# Patient Record
Sex: MALE | Race: BLACK OR AFRICAN AMERICAN | Employment: OTHER | URBAN - METROPOLITAN AREA
[De-identification: names, ages, dates, MRNs, and addresses within clinical notes are randomized per-mention and may not be internally consistent; named-entity substitution may affect disease eponyms.]

---

## 2017-03-29 ENCOUNTER — HOSPITAL ENCOUNTER (OUTPATIENT)
Dept: MRI IMAGING | Age: 37
Discharge: HOME | End: 2017-03-29
Attending: NURSE PRACTITIONER
Payer: COMMERCIAL

## 2017-03-29 DIAGNOSIS — G89.29 CHRONIC RIGHT SHOULDER PAIN: ICD-10-CM

## 2017-03-29 DIAGNOSIS — M25.511 CHRONIC RIGHT SHOULDER PAIN: ICD-10-CM

## 2017-03-29 PROCEDURE — 73221 MRI JOINT UPR EXTREM W/O DYE: CPT

## 2017-03-30 NOTE — PROGRESS NOTES
There is a 7mm partial thickness tear in the distal supraspinatus. Due to his pain and weakness, will send to orthopedic to evaluate.

## 2017-04-06 ENCOUNTER — HOSPITAL ENCOUNTER (OUTPATIENT)
Dept: SLEEP MEDICINE | Age: 37
Discharge: HOME | End: 2017-04-06
Payer: COMMERCIAL

## 2017-04-06 PROCEDURE — 95810 POLYSOM 6/> YRS 4/> PARAM: CPT

## 2017-04-17 ENCOUNTER — HOSPITAL ENCOUNTER (OUTPATIENT)
Dept: PHYSICAL THERAPY | Age: 37
Discharge: HOME | End: 2017-04-17
Payer: COMMERCIAL

## 2017-04-17 NOTE — PROGRESS NOTES
Jaya Hoskins  : 1980 [facility] at [facility]  [address], [address]  [phone]   [phone]         OUTPATIENT PHYSICAL THERAPY:Initial Assessment 2017    ICD-10: Treatment Diagnosis: Pain in right shoulder (M25.511)  Incomplete rotator cuff teaImpingement syndrome of right shoulder (M75.41)r or rupture of right shoulder, not specified as traumatic (M75.111)  Precautions/Allergies:   Penicillins and Sulfa (sulfonamide antibiotics)  Fall Risk Score: 1 (? 5 = High Risk)  MD Orders: Evaluate and treat MEDICAL/REFERRING DIAGNOSIS:  Impingement syndrome of right shoulder [M75.41]  Incomplete rotator cuff tear or rupture of right shoulder, not specified as traumatic [M75.111]   DATE OF ONSET: chronic  REFERRING PHYSICIAN: Colletta Pelton, MD  RETURN PHYSICIAN APPOINTMENT: 17     INITIAL ASSESSMENT:  Mr. Rodrigo Clark presents with chronic R shoulder pain with overhead and lifting ADL's with recent worsening of symptoms. His workout regimen of weight lifting is likely contributing to symptoms and may affect recovery without more modification of routine. He has decreased mobility with shoulder extension and IR along with decreased strength rotator cuff/scapular musculature and associated pain. He has incomplete RTC tear and is consulting doctor about possible surgery at same time as cubital tunnel surgery. He can benefit from skilled therapy to address the above deficits along with education on posture and modification of weight lifting routine. PROBLEM LIST (Impacting functional limitations):  1. Decreased Strength  2. Decreased ADL/Functional Activities  3. Increased Pain  4. Decreased Flexibility/Joint Mobility  5. Decreased [place] with Home Exercise Program INTERVENTIONS PLANNED:  1. Home Exercise Program (HEP)  2. Manual Therapy  3. Range of Motion (ROM)  4.  Therapeutic Exercise/Strengthening  5. modalities/taping as indicated   TREATMENT PLAN:  Effective Dates: 4/17/17 TO 7/17/17. Frequency/Duration: 2 times a week for 12 weeks  GOALS: (Goals have been discussed and agreed upon with patient.)  Short-Term Functional Goals: Time Frame: 2-4 weeks  1. Decrease pain by 2/10 to allow greater ease with cross-body and overhead ADL's  2. Increase ROM shoulder extension by 10 ° for decreased pain with functional movts  3. Increase strength shoulder ER by 1/2 grade to decrease shoulder impingement and pain with lifting ADL's  4. [place] in HEP with minimal cueing. 5. Patient to be aware of postural habits contributing to abnormal shoulder mechancies  DISCHARGE GOALS: Time Frame: 12 weeks  1. Decrease DASH 9 points for greater ease with all ADL's and recreational activities, including weight lifting. 2. Increase strength of rotator cuff and scapula musculature to WDL for greater ease/minimal pain with overhead and reaching UE ADL's  3. Increase ROM shoulder extension and IR to WDL for improved mechanics and functional mobility ADL's (reahcing behind back)  4. Demonstrate independence in advanced home exercise program to allow DC from physical therapy. Rehabilitation Potential For Stated Goals: Fair  Regarding Dena Lin Sr.'s therapy, I certify that the treatment plan above will be carried out by a therapist or under their direction. Thank you for this referral,  Haresh De Leon PT     Referring Physician Signature: Patsy Cardona MD              Date                    The information in this section was collected on 4/17/17 (except where otherwise noted). HISTORY:   History of Present Injury/Illness (Reason for Referral):  Patient reports pain in R shoulder on and off for past couple of years, with recent worsening of symptoms in past couple of months. Also reports intermittent popping/clicking with certain movts. Pain mostly present with reaching overhead, lifting/carrying heavy objects.  Sometimes it will ache for part of the day. Sometimes painful at night. 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 Works out 5 days/week doing weight lifting - has modified this recently due to shoulder pain (normally would do bench press, incline press, lat pull-downs, rows, front and lateral raises etc. Has stopped pull-ups due to pain and used lighter weights for other ex's. Also has R elbow pain with numbness in lateral fingers - cubital tunnel syndrome which he will have surgery for. States that he is waiting for return call from doctor regarding possibly having shoulder surgery at the same time if he is going to need it at later time anyway. Had cortisone shot 2 weeks ago with no relief of pain and possibly worsening of symptoms. Past Medical History/Comorbidities:   Mr. Luma Diaz  has a past medical history of HTN (hypertension). Mr. Luma Diaz  has a past surgical history that includes other surgical and wisdom teeth extraction. 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 , Disp: 90 Tab, Rfl: 3    varenicline (CHANTIX STARTING MONTH BOX) 0.5 mg (11)- 1 mg (42) DsPk, Take as directed., Disp: 1 Dose Pack, Rfl: 0    [START ON 4/18/2017] varenicline (CHANTIX) 1 mg tablet, Take 1 Tab by mouth two (2) times a day., Disp: 60 Tab, Rfl: 5    lisinopril-hydroCHLOROthiazide (PRINZIDE, ZESTORETIC) 20-25 mg per tablet, Take 1 Tab by mouth daily. , Disp: 90 Tab, Rfl: 3    amLODIPine (NORVASC) 5 mg tablet, Take 1 Tab by mouth daily. , Disp: 90 Tab, Rfl: 3    neomycin-polymyxin-hydrocortisone, buffered, (PEDIOTIC) 3.5-10,000-1 mg/mL-unit/mL-% otic suspension, Administer 3 Drops in left ear three (3) times daily. , Disp: 10 mL, Rfl: 0   Date Last Reviewed:  4/17/2017     Number of Personal Factors/Comorbidities that affect the Plan of Care: 1-2: MODERATE COMPLEXITY   EXAMINATION:   Observation/Orthostatic Postural Assessment: Very muscular build with both arms held in abducted position. Moderately rounded shoulders  Palpation:    Moderate tenderness over rot cuff insertion  ROM:       LUE Assessment  LUE Assessment (WDL): Exceptions to WDL  LUE AROM  L Shoulder Flexion: 160  L Shoulder Extension: 35  L Shoulder Internal Rotation: 50  L Shoulder External Rotation: 95              RUE Assessment  RUE Assessment (WDL): Exception to WDL  RUE AROM  R Shoulder Flexion: 155 (pain at 140 deg)  R Shoulder Extension: 30 (pain end range)  R Shoulder Internal Rotation: 45 (pain with IR behind back)  R Shoulder External Rotation: 90 (pain end range)      Strength:    LUE Strength, Tone, Sensation  LUE Strength, Tone, Sensation (WDL): Within defined limits        RUE Strength, Tone, Sensation  RUE Strength, Tone, Sensation (WDL): Exception to WDL  RUE Strength  R Shoulder Flexion: 4+ (pain)  R Shoulder ABduction: 4+  R Shoulder External Rotation: 4- (mod pain)            Special Tests: +ve impingement tests  Neurological Screen:  WDL  (except for int numbness 4th and 5th fingers due to cubital tunnel syndrome)  Functional Mobility:      marked limitation in IR behind back with increased pain (to L-S area)  Balance: WDL   Body Structures Involved:  1. Nerves  2. Joints  3. Muscles  4. Ligaments Body Functions Affected:  1. Sensory/Pain  2. Neuromusculoskeletal  3. Movement Related Activities and Participation Affected:  1. General Tasks and Demands  2. Mobility  3.  Self Care   Number of elements (examined above) that affect the Plan of Care: 3: MODERATE COMPLEXITY   CLINICAL PRESENTATION:   Presentation: Evolving clinical presentation with changing clinical characteristics: MODERATE COMPLEXITY   CLINICAL DECISION MAKING:   Outcome Measure: Tool Used: Disabilities of the Arm, Shoulder and Hand (DASH) Questionnaire - Quick Version  Score:  Initial: 20/55  Most Recent: X/55 (Date: -- )   Interpretation of Score: The DASH is designed to measure the activities of daily living in person's with upper extremity dysfunction or pain. Each section is scored on a 1-5 scale, 5 representing the greatest disability. The scores of each section are added together for a total score of 55. Score 11 12-19 20-28 29-37 38-45 46-54 55   Modifier CH CI CJ CK CL CM CN       Medical Necessity:   · Patient is expected to demonstrate progress in strength, range of motion and functional technique to increase independence with all UE ADL's. · Skilled intervention continues to be required due to pain, decreased ROM & strength limiting UE ADL's. Reason for Services/Other Comments:  · Patient continues to continues to require modification of therapeutic interventions to increase complexity of exercises. · Patient continues to require skilled intervention due to pain, decreased ROM, and strength limiting UE ADL's. Use of outcome tool(s) and clinical judgement create a POC that gives a: Questionable prediction of patient's progress: MODERATE COMPLEXITY            TREATMENT:   (In addition to Assessment/Re-Assessment sessions the following treatments were rendered)  Pre-treatment Symptoms/Complaints:  R shoulder pain with movement  Pain: Initial:   Pain Intensity 1: 7  Post Session:  7     Therapeutic Exercise: ( ):  Exercises as outlined below to improve mobility and strength. Required moderate verbal and tactile cues to promote proper body posture and promote proper body mechanics. Progressed resistance, range and repetitions as indicated.  Patient instructed in initial HEP followed by patient demonstration to include:    wilfrid shoulder extension, green TBand, pain-free range  Side ER , 1-2#  Supine shoulder protraction, 10#  Standing passive shoulder extension stretching with cane  Patient to begin with 1-2 sets/10 and progress to 3 sets/10 as pain allows. Cautioned to limit ROM of exercises to be painfree  Instructed in postural correction in sitting and standing and its contribution in facilitating normal shoulder mechanics. Discussed weight training modification  Manual Therapy (      ):   Evaluation (X ):  Therapeutic Modalities:     Treatment/Session Assessment:    · Response to Treatment:  Patients response to todays treatment session was tolerated with mild discomfort. The patient demonstrated independence with the initial home exercises and is to perform the exercises in conjunction with continued physical therapy. · Compliance with Program/Exercises: Will assess as treatment progresses. · Recommendations/Intent for next treatment session: \"Next visit will focus on advancements to more challenging activities\". Stretching to increase shoulder extension, IR ROM. FREQUENCY/DURATION: Follow patient 2 times a week for 12 weeks to address above goals, and upon reassessment will adjust frequency and duration as progress indicates.   Total Treatment Duration:  PT Patient Time In/Time Out  Time In: 0900  Time Out: Maryjane Gonzalez 28., PT

## 2017-04-20 ENCOUNTER — APPOINTMENT (OUTPATIENT)
Dept: PHYSICAL THERAPY | Age: 37
End: 2017-04-20
Payer: COMMERCIAL

## 2017-04-21 ENCOUNTER — HOSPITAL ENCOUNTER (OUTPATIENT)
Dept: PHYSICAL THERAPY | Age: 37
Discharge: HOME | End: 2017-04-21
Payer: COMMERCIAL

## 2017-04-21 PROCEDURE — 97110 THERAPEUTIC EXERCISES: CPT

## 2017-04-24 ENCOUNTER — HOSPITAL ENCOUNTER (OUTPATIENT)
Dept: PHYSICAL THERAPY | Age: 37
Discharge: HOME | End: 2017-04-24
Payer: COMMERCIAL

## 2017-04-24 PROCEDURE — 97110 THERAPEUTIC EXERCISES: CPT

## 2017-04-24 NOTE — PROGRESS NOTES
Anahi Chambers  : 1980 [facility]  [address], [facility], [address]  [phone]   [phone]         OUTPATIENT PHYSICAL THERAPY:Daily Note 2017    ICD-10: Treatment Diagnosis: Pain in right shoulder (M25.511)  Incomplete rotator cuff teaImpingement syndrome of right shoulder (M75.41)r or rupture of right shoulder, not specified as traumatic (M75.111)  Precautions/Allergies:   Penicillins and Sulfa (sulfonamide antibiotics)  Fall Risk Score: 1 (? 5 = High Risk)  MD Orders: Evaluate and treat MEDICAL/REFERRING DIAGNOSIS:  Impingement syndrome of right shoulder [M75.41]  Incomplete rotator cuff tear or rupture of right shoulder, not specified as traumatic [M75.111]   DATE OF ONSET: chronic  REFERRING PHYSICIAN: Ken Carpio MD  RETURN PHYSICIAN APPOINTMENT: 17     INITIAL ASSESSMENT:  Mr. Estefany Zepeda presents with chronic R shoulder pain with overhead and lifting ADL's with recent worsening of symptoms. His workout regimen of weight lifting is likely contributing to symptoms and may affect recovery without more modification of routine. He has decreased mobility with shoulder extension and IR along with decreased strength rotator cuff/scapular musculature and associated pain. He has incomplete RTC tear and is consulting doctor about possible surgery at same time as cubital tunnel surgery. He can benefit from skilled therapy to address the above deficits along with education on posture and modification of weight lifting routine. PROBLEM LIST (Impacting functional limitations):  1. Decreased Strength  2. Decreased ADL/Functional Activities  3. Increased Pain  4. Decreased Flexibility/Joint Mobility  5. Decreased [place] with Home Exercise Program INTERVENTIONS PLANNED:  1. Home Exercise Program (HEP)  2. Manual Therapy  3. Range of Motion (ROM)  4.  Therapeutic Exercise/Strengthening  5. modalities/taping as indicated TREATMENT PLAN:  Effective Dates: 4/17/17 TO 7/17/17. Frequency/Duration: 2 times a week for 12 weeks  GOALS: (Goals have been discussed and agreed upon with patient.)  Short-Term Functional Goals: Time Frame: 2-4 weeks  1. Decrease pain by 2/10 to allow greater ease with cross-body and overhead ADL's  2. Increase ROM shoulder extension by 10 ° for decreased pain with functional movts  3. Increase strength shoulder ER by 1/2 grade to decrease shoulder impingement and pain with lifting ADL's  4. [place] in HEP with minimal cueing. 5. Patient to be aware of postural habits contributing to abnormal shoulder mechancies  DISCHARGE GOALS: Time Frame: 12 weeks  1. Decrease DASH 9 points for greater ease with all ADL's and recreational activities, including weight lifting. 2. Increase strength of rotator cuff and scapula musculature to WDL for greater ease/minimal pain with overhead and reaching UE ADL's  3. Increase ROM shoulder extension and IR to WDL for improved mechanics and functional mobility ADL's (reahcing behind back)  4. Demonstrate independence in advanced home exercise program to allow DC from physical therapy. Rehabilitation Potential For Stated Goals: Fair  Regarding Demarionick Vance Sr.'s therapy, I certify that the treatment plan above will be carried out by a therapist or under their direction. Thank you for this referral,  Aida Vaughn PT     Referring Physician Signature: Nicky Huitron MD              Date                    The information in this section was collected on 4/17/17 (except where otherwise noted). HISTORY:   History of Present Injury/Illness (Reason for Referral):  Patient reports pain in R shoulder on and off for past couple of years, with recent worsening of symptoms in past couple of months. Also reports intermittent popping/clicking with certain movts. Pain mostly present with reaching overhead, lifting/carrying heavy objects.  Sometimes it will ache for part of the day. Sometimes painful at night. 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 Works out 5 days/week doing weight lifting - has modified this recently due to shoulder pain (normally would do bench press, incline press, lat pull-downs, rows, front and lateral raises etc. Has stopped pull-ups due to pain and used lighter weights for other ex's. Also has R elbow pain with numbness in lateral fingers - cubital tunnel syndrome which he will have surgery for. States that he is waiting for return call from doctor regarding possibly having shoulder surgery at the same time if he is going to need it at later time anyway. Had cortisone shot 2 weeks ago with no relief of pain and possibly worsening of symptoms. Past Medical History/Comorbidities:   Mr. Marleny Solares  has a past medical history of HTN (hypertension). Mr. Marleny Solares  has a past surgical history that includes other surgical and wisdom teeth extraction. 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 , Disp: 90 Tab, Rfl: 3    varenicline (CHANTIX STARTING MONTH BOX) 0.5 mg (11)- 1 mg (42) DsPk, Take as directed., Disp: 1 Dose Pack, Rfl: 0    varenicline (CHANTIX) 1 mg tablet, Take 1 Tab by mouth two (2) times a day., Disp: 60 Tab, Rfl: 5    lisinopril-hydroCHLOROthiazide (PRINZIDE, ZESTORETIC) 20-25 mg per tablet, Take 1 Tab by mouth daily. , Disp: 90 Tab, Rfl: 3    amLODIPine (NORVASC) 5 mg tablet, Take 1 Tab by mouth daily. , Disp: 90 Tab, Rfl: 3    neomycin-polymyxin-hydrocortisone, buffered, (PEDIOTIC) 3.5-10,000-1 mg/mL-unit/mL-% otic suspension, Administer 3 Drops in left ear three (3) times daily. , Disp: 10 mL, Rfl: 0   Date Last Reviewed:  4/24/2017     Number of Personal Factors/Comorbidities that affect the Plan of Care: 1-2: MODERATE COMPLEXITY   EXAMINATION:   Observation/Orthostatic Postural Assessment: Very muscular build with both arms held in abducted position. Moderately rounded shoulders  Palpation:    Moderate tenderness over rot cuff insertion  ROM:       LUE Assessment  LUE Assessment (WDL): Exceptions to WDL  LUE AROM  L Shoulder Flexion: 160  L Shoulder Extension: 35  L Shoulder Internal Rotation: 50  L Shoulder External Rotation: 95              RUE Assessment  RUE Assessment (WDL): Exception to WDL  RUE AROM  R Shoulder Flexion: 155 (pain at 140 deg)  R Shoulder Extension: 30 (pain end range)  R Shoulder Internal Rotation: 45 (pain with IR behind back)  R Shoulder External Rotation: 90 (pain end range)      Strength:    LUE Strength, Tone, Sensation  LUE Strength, Tone, Sensation (WDL): Within defined limits        RUE Strength, Tone, Sensation  RUE Strength, Tone, Sensation (WDL): Exception to WDL  RUE Strength  R Shoulder Flexion: 4+ (pain)  R Shoulder ABduction: 4+  R Shoulder External Rotation: 4- (mod pain)            Special Tests: +ve impingement tests  Neurological Screen:  WDL  (except for int numbness 4th and 5th fingers due to cubital tunnel syndrome)  Functional Mobility:      marked limitation in IR behind back with increased pain (to L-S area)  Balance: WDL   Body Structures Involved:  1. Nerves  2. Joints  3. Muscles  4. Ligaments Body Functions Affected:  1. Sensory/Pain  2. Neuromusculoskeletal  3. Movement Related Activities and Participation Affected:  1. General Tasks and Demands  2. Mobility  3.  Self Care   Number of elements (examined above) that affect the Plan of Care: 3: MODERATE COMPLEXITY   CLINICAL PRESENTATION:   Presentation: Evolving clinical presentation with changing clinical characteristics: MODERATE COMPLEXITY   CLINICAL DECISION MAKING: Outcome Measure: Tool Used: Disabilities of the Arm, Shoulder and Hand (DASH) Questionnaire - Quick Version  Score:  Initial: 20/55  Most Recent: X/55 (Date: -- )   Interpretation of Score: The DASH is designed to measure the activities of daily living in person's with upper extremity dysfunction or pain. Each section is scored on a 1-5 scale, 5 representing the greatest disability. The scores of each section are added together for a total score of 55. Score 11 12-19 20-28 29-37 38-45 46-54 55   Modifier CH CI CJ CK CL CM CN       Medical Necessity:   · Patient is expected to demonstrate progress in strength, range of motion and functional technique to increase independence with all UE ADL's. · Skilled intervention continues to be required due to pain, decreased ROM & strength limiting UE ADL's. Reason for Services/Other Comments:  · Patient continues to continues to require modification of therapeutic interventions to increase complexity of exercises. · Patient continues to require skilled intervention due to pain, decreased ROM, and strength limiting UE ADL's. Use of outcome tool(s) and clinical judgement create a POC that gives a: Questionable prediction of patient's progress: MODERATE COMPLEXITY            TREATMENT:   (In addition to Assessment/Re-Assessment sessions the following treatments were rendered)  Pre-treatment Symptoms/Complaints:  States shoulder not feeling quite as painful today. Pain: Initial:   Pain Intensity 1: 6  Post Session:  7     Therapeutic Exercise: (40 min ):  Exercises as outlined below to improve mobility and strength. Required moderate verbal and tactile cues to promote proper body posture and promote proper body mechanics. Progressed resistance, range and repetitions as indicated.      wilfrid shoulder extension, blueTBand, pain-free range, 2 x 10  Side ER , 1# , 2 x 10  Supine shoulder protraction, 10#, 2 x 10  Supine shoulder circumduction, 10#, 1 x 10 each eay  Standing passive shoulder extension stretching with cane - reviewed  Prone sh ext, 10#, 2 x 10  Prone horiz abd, 3#, 2 x 8  Sh ER, red Tband, 2 x 10  Sh IR, blue Tband, 2 x 10  Shoulder retraction, blue TBand, 2 x 10  Scaption to 90 deg, 5# , 2 x 10  Sitting push-ups, 1 x 10  Instructed in postural correction in sitting and standing and its contribution in facilitating normal shoulder mechanics. Discussed weight training modification  (reviewed)  Manual Therapy (5 min): Shoulder distraction, inf glide jt mobs, passive stretching into extension in standing, also in supine to increase IR mob    Therapeutic Modalities: encouraged to use ice at home     Treatment/Session Assessment:    · Response to Treatment:  Patients response to todays treatment session was tolerated with mild discomfort. Fatigues easily with ER ex and horiz abd - particularly weak at end rg. Less pain with overhead elevation after extension stretching. Did well with scaption exercises. Discussed need to continue to modify weight training ex's to minimise irritation of shoulder. · Compliance with Program/Exercises: Compliant with HEP. · Recommendations/Intent for next treatment session: \"Next visit will focus on advancements to more challenging activities\". Stretching to increase shoulder extension, IR ROM. FREQUENCY/DURATION: Follow patient 2 times a week for 12 weeks to address above goals, and upon reassessment will adjust frequency and duration as progress indicates.   Total Treatment Duration:  PT Patient Time In/Time Out  Time In: 1215  Time Out: 1305  No. Of Visits: SAMANTHA Novak 54, PT

## 2017-04-26 ENCOUNTER — HOSPITAL ENCOUNTER (OUTPATIENT)
Dept: PHYSICAL THERAPY | Age: 37
Discharge: HOME | End: 2017-04-26
Payer: COMMERCIAL

## 2017-04-26 PROCEDURE — 97110 THERAPEUTIC EXERCISES: CPT

## 2017-04-26 NOTE — PROGRESS NOTES
Kathy Sage  : 1980 [facility]  [address], [facility], [address]  [phone]   [phone]         OUTPATIENT PHYSICAL THERAPY:Daily Note 2017    ICD-10: Treatment Diagnosis: Pain in right shoulder (M25.511)  Incomplete rotator cuff teaImpingement syndrome of right shoulder (M75.41)r or rupture of right shoulder, not specified as traumatic (M75.111)  Precautions/Allergies:   Penicillins and Sulfa (sulfonamide antibiotics)  Fall Risk Score: 1 (? 5 = High Risk)  MD Orders: Evaluate and treat MEDICAL/REFERRING DIAGNOSIS:  Impingement syndrome of right shoulder [M75.41]  Incomplete rotator cuff tear or rupture of right shoulder, not specified as traumatic [M75.111]   DATE OF ONSET: chronic  REFERRING PHYSICIAN: Deborah Lei MD  RETURN PHYSICIAN APPOINTMENT: 17     INITIAL ASSESSMENT:  Mr. Suyapa Tariq presents with chronic R shoulder pain with overhead and lifting ADL's with recent worsening of symptoms. His workout regimen of weight lifting is likely contributing to symptoms and may affect recovery without more modification of routine. He has decreased mobility with shoulder extension and IR along with decreased strength rotator cuff/scapular musculature and associated pain. He has incomplete RTC tear and is consulting doctor about possible surgery at same time as cubital tunnel surgery. He can benefit from skilled therapy to address the above deficits along with education on posture and modification of weight lifting routine. PROBLEM LIST (Impacting functional limitations):  1. Decreased Strength  2. Decreased ADL/Functional Activities  3. Increased Pain  4. Decreased Flexibility/Joint Mobility  5. Decreased Felt with Home Exercise Program INTERVENTIONS PLANNED:  1. Home Exercise Program (HEP)  2. Manual Therapy  3. Range of Motion (ROM)  4.  Therapeutic Exercise/Strengthening  5. modalities/taping as indicated TREATMENT PLAN:  Effective Dates: 4/17/17 TO 7/17/17. Frequency/Duration: 2 times a week for 12 weeks  GOALS: (Goals have been discussed and agreed upon with patient.)  Short-Term Functional Goals: Time Frame: 2-4 weeks  1. Decrease pain by 2/10 to allow greater ease with cross-body and overhead ADL's  2. Increase ROM shoulder extension by 10 ° for decreased pain with functional movts  3. Increase strength shoulder ER by 1/2 grade to decrease shoulder impingement and pain with lifting ADL's  4. [place] in HEP with minimal cueing. 5. Patient to be aware of postural habits contributing to abnormal shoulder mechancies  DISCHARGE GOALS: Time Frame: 12 weeks  1. Decrease DASH 9 points for greater ease with all ADL's and recreational activities, including weight lifting. 2. Increase strength of rotator cuff and scapula musculature to WDL for greater ease/minimal pain with overhead and reaching UE ADL's  3. Increase ROM shoulder extension and IR to WDL for improved mechanics and functional mobility ADL's (reahcing behind back)  4. Demonstrate independence in advanced home exercise program to allow DC from physical therapy. Rehabilitation Potential For Stated Goals: Fair  Regarding Dena Lin Sr.'s therapy, I certify that the treatment plan above will be carried out by a therapist or under their direction. Thank you for this referral,  Haresh De Leon PT     Referring Physician Signature: Patsy Cardona MD              Date                    The information in this section was collected on 4/17/17 (except where otherwise noted). HISTORY:   History of Present Injury/Illness (Reason for Referral):  Patient reports pain in R shoulder on and off for past couple of years, with recent worsening of symptoms in past couple of months. Also reports intermittent popping/clicking with certain movts. Pain mostly present with reaching overhead, lifting/carrying heavy objects.  Sometimes it will ache for part of the day. Sometimes painful at night. 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 Works out 5 days/week doing weight lifting - has modified this recently due to shoulder pain (normally would do bench press, incline press, lat pull-downs, rows, front and lateral raises etc. Has stopped pull-ups due to pain and used lighter weights for other ex's. Also has R elbow pain with numbness in lateral fingers - cubital tunnel syndrome which he will have surgery for. States that he is waiting for return call from doctor regarding possibly having shoulder surgery at the same time if he is going to need it at later time anyway. Had cortisone shot 2 weeks ago with no relief of pain and possibly worsening of symptoms. Past Medical History/Comorbidities:   Mr. Nemo Collins  has a past medical history of HTN (hypertension). Mr. Nemo Collins  has a past surgical history that includes other surgical and wisdom teeth extraction. 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 , Disp: 90 Tab, Rfl: 3    varenicline (CHANTIX STARTING MONTH BOX) 0.5 mg (11)- 1 mg (42) DsPk, Take as directed., Disp: 1 Dose Pack, Rfl: 0    varenicline (CHANTIX) 1 mg tablet, Take 1 Tab by mouth two (2) times a day., Disp: 60 Tab, Rfl: 5    lisinopril-hydroCHLOROthiazide (PRINZIDE, ZESTORETIC) 20-25 mg per tablet, Take 1 Tab by mouth daily. , Disp: 90 Tab, Rfl: 3    amLODIPine (NORVASC) 5 mg tablet, Take 1 Tab by mouth daily. , Disp: 90 Tab, Rfl: 3    neomycin-polymyxin-hydrocortisone, buffered, (PEDIOTIC) 3.5-10,000-1 mg/mL-unit/mL-% otic suspension, Administer 3 Drops in left ear three (3) times daily. , Disp: 10 mL, Rfl: 0   Date Last Reviewed:  4/26/2017     Number of Personal Factors/Comorbidities that affect the Plan of Care: 1-2: MODERATE COMPLEXITY   EXAMINATION:   Observation/Orthostatic Postural Assessment: Very muscular build with both arms held in abducted position. Moderately rounded shoulders  Palpation:    Moderate tenderness over rot cuff insertion  ROM:       LUE Assessment  LUE Assessment (WDL): Exceptions to WDL  LUE AROM  L Shoulder Flexion: 160  L Shoulder Extension: 35  L Shoulder Internal Rotation: 50  L Shoulder External Rotation: 95              RUE Assessment  RUE Assessment (WDL): Exception to WDL  RUE AROM  R Shoulder Flexion: 155 (pain at 140 deg)  R Shoulder Extension: 30 (pain end range)  R Shoulder Internal Rotation: 45 (pain with IR behind back)  R Shoulder External Rotation: 90 (pain end range)      Strength:    LUE Strength, Tone, Sensation  LUE Strength, Tone, Sensation (WDL): Within defined limits        RUE Strength, Tone, Sensation  RUE Strength, Tone, Sensation (WDL): Exception to WDL  RUE Strength  R Shoulder Flexion: 4+ (pain)  R Shoulder ABduction: 4+  R Shoulder External Rotation: 4- (mod pain)            Special Tests: +ve impingement tests  Neurological Screen:  WDL  (except for int numbness 4th and 5th fingers due to cubital tunnel syndrome)  Functional Mobility:      marked limitation in IR behind back with increased pain (to L-S area)  Balance: WDL   Body Structures Involved:  1. Nerves  2. Joints  3. Muscles  4. Ligaments Body Functions Affected:  1. Sensory/Pain  2. Neuromusculoskeletal  3. Movement Related Activities and Participation Affected:  1. General Tasks and Demands  2. Mobility  3.  Self Care   Number of elements (examined above) that affect the Plan of Care: 3: MODERATE COMPLEXITY   CLINICAL PRESENTATION:   Presentation: Evolving clinical presentation with changing clinical characteristics: MODERATE COMPLEXITY   CLINICAL DECISION MAKING: Outcome Measure: Tool Used: Disabilities of the Arm, Shoulder and Hand (DASH) Questionnaire - Quick Version  Score:  Initial: 20/55  Most Recent: X/55 (Date: -- )   Interpretation of Score: The DASH is designed to measure the activities of daily living in person's with upper extremity dysfunction or pain. Each section is scored on a 1-5 scale, 5 representing the greatest disability. The scores of each section are added together for a total score of 55. Score 11 12-19 20-28 29-37 38-45 46-54 55   Modifier CH CI CJ CK CL CM CN       Medical Necessity:   · Patient is expected to demonstrate progress in strength, range of motion and functional technique to increase independence with all UE ADL's. · Skilled intervention continues to be required due to pain, decreased ROM & strength limiting UE ADL's. Reason for Services/Other Comments:  · Patient continues to continues to require modification of therapeutic interventions to increase complexity of exercises. · Patient continues to require skilled intervention due to pain, decreased ROM, and strength limiting UE ADL's. Use of outcome tool(s) and clinical judgement create a POC that gives a: Questionable prediction of patient's progress: MODERATE COMPLEXITY            TREATMENT:   (In addition to Assessment/Re-Assessment sessions the following treatments were rendered)  Pre-treatment Symptoms/Complaints:  States shoulder felt not too bad with workout yesterday. Pain: Initial:   Pain Intensity 1: 6  Post Session:  7     Therapeutic Exercise: (40 min ):  Exercises as outlined below to improve mobility and strength. Required moderate verbal and tactile cues to promote proper body posture and promote proper body mechanics. Progressed resistance, range and repetitions as indicated.      wilfrid shoulder extension, blueTBand, pain-free range, 2 x 10  Side ER , 1# , 2 x 10  Supine shoulder protraction, 10# kettlebell, 2 x 10  Supine shoulder circumduction, 10#, 1 x 10 each eay  Standing passive shoulder extension stretching with cane - reviewed  Prone sh ext, 10#, 2 x 10  Prone horiz abd, 3#, 2 x 8  Sh ER, red Tband, 2 x 10 (1 set with sh in 70 deg flex)  Sh IR, blue Tband, 2 x 10  Shoulder retraction, blue TBand, 2 x 10  [place] to 90 deg, 5# , 2 x 10 (1 set with green TBand)  Sitting push-ups, 2 x 10  Wall \"spiderman walks) with red Tband  Reinforced postural correction in sitting and standing and its contribution in facilitating normal shoulder mechanics. Discussed weight training modification  (reviewed)  Manual Therapy (5 min): Shoulder distraction, inf glide jt mobs, passive stretching into extension in standing, also in supine to increase IR mob    Therapeutic Modalities: encouraged to use ice at home     Treatment/Session Assessment:    · Response to Treatment:  Patients response to todays treatment session was tolerated with mild discomfort. Fatigues easily with ER ex and horiz abd - particularly weak at end rg - did better with ER today. Less pain with overhead elevation after extension stretching. Mild discomfort with theraband scaption exercises. Mod difficulty with spiderman ex. Discussed need to continue to modify weight training ex's to minimise irritation of shoulder. · Compliance with Program/Exercises: Compliant with HEP. · Recommendations/Intent for next treatment session: \"Next visit will focus on advancements to more challenging activities\". Stretching to increase shoulder extension, IR ROM. FREQUENCY/DURATION: Follow patient 2 times a week for 12 weeks to address above goals, and upon reassessment will adjust frequency and duration as progress indicates.   Total Treatment Duration:  PT Patient Time In/Time Out  Time In: 1215  Time Out: 1305  No. Of Visits: 24 [facility], PT

## 2017-05-12 ENCOUNTER — HOSPITAL ENCOUNTER (OUTPATIENT)
Dept: PHYSICAL THERAPY | Age: 37
End: 2017-05-12
Payer: COMMERCIAL

## 2017-05-16 ENCOUNTER — APPOINTMENT (OUTPATIENT)
Dept: PHYSICAL THERAPY | Age: 37
End: 2017-05-16
Payer: COMMERCIAL

## 2017-05-18 ENCOUNTER — ANESTHESIA EVENT (OUTPATIENT)
Dept: SURGERY | Age: 37
End: 2017-05-18
Payer: COMMERCIAL

## 2017-05-19 ENCOUNTER — APPOINTMENT (OUTPATIENT)
Dept: PHYSICAL THERAPY | Age: 37
End: 2017-05-19
Payer: COMMERCIAL

## 2017-05-19 ENCOUNTER — ANESTHESIA (OUTPATIENT)
Dept: SURGERY | Age: 37
End: 2017-05-19
Payer: COMMERCIAL

## 2017-05-19 ENCOUNTER — HOSPITAL ENCOUNTER (OUTPATIENT)
Age: 37
Setting detail: OUTPATIENT SURGERY
Discharge: HOME | End: 2017-05-19
Attending: ORTHOPAEDIC SURGERY | Admitting: ORTHOPAEDIC SURGERY
Payer: COMMERCIAL

## 2017-05-19 VITALS
HEART RATE: 92 BPM | DIASTOLIC BLOOD PRESSURE: 67 MMHG | BODY MASS INDEX: 37.31 KG/M2 | OXYGEN SATURATION: 93 % | WEIGHT: 275.13 LBS | SYSTOLIC BLOOD PRESSURE: 123 MMHG | RESPIRATION RATE: 16 BRPM | TEMPERATURE: 97.6 F

## 2017-05-19 LAB — POTASSIUM BLD-SCNC: 4 MMOL/L (ref 3.5–5.1)

## 2017-05-19 PROCEDURE — 77030006668 HC BLD SHV MENSCS STRY -B: Performed by: ORTHOPAEDIC SURGERY

## 2017-05-19 PROCEDURE — 77030003666 HC NDL SPINAL BD -A: Performed by: ORTHOPAEDIC SURGERY

## 2017-05-19 PROCEDURE — 74011250636 HC RX REV CODE- 250/636: Performed by: ORTHOPAEDIC SURGERY

## 2017-05-19 PROCEDURE — 77030010430: Performed by: ORTHOPAEDIC SURGERY

## 2017-05-19 PROCEDURE — 77030019605: Performed by: ORTHOPAEDIC SURGERY

## 2017-05-19 PROCEDURE — 76060000034 HC ANESTHESIA 1.5 TO 2 HR: Performed by: ORTHOPAEDIC SURGERY

## 2017-05-19 PROCEDURE — 77030034139 HC NDL ENDOSC TRUPASS SGL S&N -C: Performed by: ORTHOPAEDIC SURGERY

## 2017-05-19 PROCEDURE — 77030018836 HC SOL IRR NACL ICUM -A: Performed by: ORTHOPAEDIC SURGERY

## 2017-05-19 PROCEDURE — 77030010427: Performed by: ORTHOPAEDIC SURGERY

## 2017-05-19 PROCEDURE — 77030027384 HC PRB ARTHSCP SERFAS STRY -C: Performed by: ORTHOPAEDIC SURGERY

## 2017-05-19 PROCEDURE — 76942 ECHO GUIDE FOR BIOPSY: CPT | Performed by: ORTHOPAEDIC SURGERY

## 2017-05-19 PROCEDURE — 76010000162 HC OR TIME 1.5 TO 2 HR INTENSV-TIER 1: Performed by: ORTHOPAEDIC SURGERY

## 2017-05-19 PROCEDURE — 76210000063 HC OR PH I REC FIRST 0.5 HR: Performed by: ORTHOPAEDIC SURGERY

## 2017-05-19 PROCEDURE — 77030004453 HC BUR SHV STRY -B: Performed by: ORTHOPAEDIC SURGERY

## 2017-05-19 PROCEDURE — 77030032490 HC SLV COMPR SCD KNE COVD -B: Performed by: ORTHOPAEDIC SURGERY

## 2017-05-19 PROCEDURE — C1713 ANCHOR/SCREW BN/BN,TIS/BN: HCPCS | Performed by: ORTHOPAEDIC SURGERY

## 2017-05-19 PROCEDURE — 77030033138 HC SUT PGA STRATFX J&J -B: Performed by: ORTHOPAEDIC SURGERY

## 2017-05-19 PROCEDURE — 77030033005 HC TBNG ARTHSC PMP STRY -B: Performed by: ORTHOPAEDIC SURGERY

## 2017-05-19 PROCEDURE — 84132 ASSAY OF SERUM POTASSIUM: CPT

## 2017-05-19 PROCEDURE — 74011250636 HC RX REV CODE- 250/636: Performed by: ANESTHESIOLOGY

## 2017-05-19 PROCEDURE — 74011250636 HC RX REV CODE- 250/636

## 2017-05-19 PROCEDURE — 77030033073 HC TBNG ARTHSC PMP OUTFLO STRY -B: Performed by: ORTHOPAEDIC SURGERY

## 2017-05-19 PROCEDURE — 77030002933 HC SUT MCRYL J&J -A: Performed by: ORTHOPAEDIC SURGERY

## 2017-05-19 PROCEDURE — 74011000250 HC RX REV CODE- 250

## 2017-05-19 PROCEDURE — 77030003602 HC NDL NRV BLK BBMI -B: Performed by: ANESTHESIOLOGY

## 2017-05-19 PROCEDURE — 77030020143 HC AIRWY LARYN INTUB CGAS -A: Performed by: ANESTHESIOLOGY

## 2017-05-19 PROCEDURE — 77030018673: Performed by: ORTHOPAEDIC SURGERY

## 2017-05-19 PROCEDURE — 76210000020 HC REC RM PH II FIRST 0.5 HR: Performed by: ORTHOPAEDIC SURGERY

## 2017-05-19 PROCEDURE — 76010010054 HC POST OP PAIN BLOCK: Performed by: ORTHOPAEDIC SURGERY

## 2017-05-19 PROCEDURE — 77030020753 HC CUF TRNQT 1BLA STRY -B: Performed by: ORTHOPAEDIC SURGERY

## 2017-05-19 DEVICE — MULTIFIX S-ULTRA 5.5MM KNOTLESS ANCHOR
Type: IMPLANTABLE DEVICE | Site: SHOULDER | Status: FUNCTIONAL
Brand: MULTIFIX

## 2017-05-19 RX ORDER — ONDANSETRON 2 MG/ML
INJECTION INTRAMUSCULAR; INTRAVENOUS AS NEEDED
Status: DISCONTINUED | OUTPATIENT
Start: 2017-05-19 | End: 2017-05-19 | Stop reason: HOSPADM

## 2017-05-19 RX ORDER — EPINEPHRINE 1 MG/ML
INJECTION, SOLUTION, CONCENTRATE INTRAVENOUS AS NEEDED
Status: DISCONTINUED | OUTPATIENT
Start: 2017-05-19 | End: 2017-05-19 | Stop reason: HOSPADM

## 2017-05-19 RX ORDER — DEXAMETHASONE SODIUM PHOSPHATE 4 MG/ML
INJECTION, SOLUTION INTRA-ARTICULAR; INTRALESIONAL; INTRAMUSCULAR; INTRAVENOUS; SOFT TISSUE AS NEEDED
Status: DISCONTINUED | OUTPATIENT
Start: 2017-05-19 | End: 2017-05-19 | Stop reason: HOSPADM

## 2017-05-19 RX ORDER — OXYCODONE HYDROCHLORIDE 5 MG/1
5 TABLET ORAL
Status: DISCONTINUED | OUTPATIENT
Start: 2017-05-19 | End: 2017-05-19 | Stop reason: HOSPADM

## 2017-05-19 RX ORDER — HYDROMORPHONE HYDROCHLORIDE 2 MG/ML
0.5 INJECTION, SOLUTION INTRAMUSCULAR; INTRAVENOUS; SUBCUTANEOUS
Status: COMPLETED | OUTPATIENT
Start: 2017-05-19 | End: 2017-05-19

## 2017-05-19 RX ORDER — ROPIVACAINE HYDROCHLORIDE 5 MG/ML
INJECTION, SOLUTION EPIDURAL; INFILTRATION; PERINEURAL AS NEEDED
Status: DISCONTINUED | OUTPATIENT
Start: 2017-05-19 | End: 2017-05-19 | Stop reason: HOSPADM

## 2017-05-19 RX ORDER — MIDAZOLAM HYDROCHLORIDE 1 MG/ML
2 INJECTION, SOLUTION INTRAMUSCULAR; INTRAVENOUS
Status: DISCONTINUED | OUTPATIENT
Start: 2017-05-19 | End: 2017-05-19 | Stop reason: HOSPADM

## 2017-05-19 RX ORDER — LIDOCAINE HYDROCHLORIDE 20 MG/ML
INJECTION, SOLUTION EPIDURAL; INFILTRATION; INTRACAUDAL; PERINEURAL AS NEEDED
Status: DISCONTINUED | OUTPATIENT
Start: 2017-05-19 | End: 2017-05-19 | Stop reason: HOSPADM

## 2017-05-19 RX ORDER — SODIUM CHLORIDE 0.9 % (FLUSH) 0.9 %
5-10 SYRINGE (ML) INJECTION AS NEEDED
Status: DISCONTINUED | OUTPATIENT
Start: 2017-05-19 | End: 2017-05-19 | Stop reason: HOSPADM

## 2017-05-19 RX ORDER — FENTANYL CITRATE 50 UG/ML
100 INJECTION, SOLUTION INTRAMUSCULAR; INTRAVENOUS ONCE
Status: COMPLETED | OUTPATIENT
Start: 2017-05-19 | End: 2017-05-19

## 2017-05-19 RX ORDER — OXYCODONE AND ACETAMINOPHEN 10; 325 MG/1; MG/1
1 TABLET ORAL AS NEEDED
Status: DISCONTINUED | OUTPATIENT
Start: 2017-05-19 | End: 2017-05-19 | Stop reason: HOSPADM

## 2017-05-19 RX ORDER — SODIUM CHLORIDE, SODIUM LACTATE, POTASSIUM CHLORIDE, CALCIUM CHLORIDE 600; 310; 30; 20 MG/100ML; MG/100ML; MG/100ML; MG/100ML
75 INJECTION, SOLUTION INTRAVENOUS CONTINUOUS
Status: DISCONTINUED | OUTPATIENT
Start: 2017-05-19 | End: 2017-05-19 | Stop reason: HOSPADM

## 2017-05-19 RX ORDER — PROPOFOL 10 MG/ML
INJECTION, EMULSION INTRAVENOUS AS NEEDED
Status: DISCONTINUED | OUTPATIENT
Start: 2017-05-19 | End: 2017-05-19 | Stop reason: HOSPADM

## 2017-05-19 RX ADMIN — ONDANSETRON 4 MG: 2 INJECTION INTRAMUSCULAR; INTRAVENOUS at 11:57

## 2017-05-19 RX ADMIN — HYDROMORPHONE HYDROCHLORIDE 0.5 MG: 2 INJECTION, SOLUTION INTRAMUSCULAR; INTRAVENOUS; SUBCUTANEOUS at 13:50

## 2017-05-19 RX ADMIN — SODIUM CHLORIDE, SODIUM LACTATE, POTASSIUM CHLORIDE, AND CALCIUM CHLORIDE 75 ML/HR: 600; 310; 30; 20 INJECTION, SOLUTION INTRAVENOUS at 10:15

## 2017-05-19 RX ADMIN — MIDAZOLAM HYDROCHLORIDE 2 MG: 1 INJECTION, SOLUTION INTRAMUSCULAR; INTRAVENOUS at 10:19

## 2017-05-19 RX ADMIN — ROPIVACAINE HYDROCHLORIDE 30 ML: 5 INJECTION, SOLUTION EPIDURAL; INFILTRATION; PERINEURAL at 10:21

## 2017-05-19 RX ADMIN — LIDOCAINE HYDROCHLORIDE 80 MG: 20 INJECTION, SOLUTION EPIDURAL; INFILTRATION; INTRACAUDAL; PERINEURAL at 11:47

## 2017-05-19 RX ADMIN — FENTANYL CITRATE 100 MCG: 50 INJECTION, SOLUTION INTRAMUSCULAR; INTRAVENOUS at 10:19

## 2017-05-19 RX ADMIN — DEXAMETHASONE SODIUM PHOSPHATE 4 MG: 4 INJECTION, SOLUTION INTRA-ARTICULAR; INTRALESIONAL; INTRAMUSCULAR; INTRAVENOUS; SOFT TISSUE at 11:57

## 2017-05-19 RX ADMIN — HYDROMORPHONE HYDROCHLORIDE 0.5 MG: 2 INJECTION, SOLUTION INTRAMUSCULAR; INTRAVENOUS; SUBCUTANEOUS at 14:04

## 2017-05-19 RX ADMIN — PROPOFOL 400 MG: 10 INJECTION, EMULSION INTRAVENOUS at 11:47

## 2017-05-19 RX ADMIN — HYDROMORPHONE HYDROCHLORIDE 0.5 MG: 2 INJECTION, SOLUTION INTRAMUSCULAR; INTRAVENOUS; SUBCUTANEOUS at 13:44

## 2017-05-19 RX ADMIN — HYDROMORPHONE HYDROCHLORIDE 0.5 MG: 2 INJECTION, SOLUTION INTRAMUSCULAR; INTRAVENOUS; SUBCUTANEOUS at 13:58

## 2017-05-19 RX ADMIN — CEFAZOLIN 3 G: 1 INJECTION, POWDER, FOR SOLUTION INTRAMUSCULAR; INTRAVENOUS; PARENTERAL at 11:42

## 2017-05-19 NOTE — IP AVS SNAPSHOT
[address] 
[phone] Patient: Sofi Ryan Sr. MRN: [id number] MOS:[birth year] You are allergic to the following Allergen Reactions Penicillins Hives Sulfa (Sulfonamide Antibiotics) Hives Recent Documentation Weight BMI Smoking Status 124.8 kg 37.31 kg/m2 Current Some Day Smoker Emergency Contacts Name Discharge Info Relation Home Work Mobile [employer] CAREGIVER [3] Spouse [3] [phone] About your hospitalization You were admitted on:  May 19, 2017 You last received care in the:  [facility] OP PACU You were discharged on:  May 19, 2017 Unit phone number:  [phone] Why you were hospitalized Your primary diagnosis was:  Not on File Providers Seen During Your Hospitalizations Provider Role Specialty Primary office phone Gustabo Colorado MD Attending Provider Orthopedic Surgery [phone] Your Primary Care Physician (PCP) Primary Care Physician Office Phone Office Fax [address], [address] [phone] Follow-up Information None Your Appointments Friday May 26, 2017  4:00 PM EDT  
PT Recurring Visit Letty Matter with Yosef Lyles [facility] ([address]) [address] [place] [address] [phone] Wednesday May 31, 2017 10:30 AM EDT  
PT Recurring Visit Letty Matter with Yael Mensah, PT  
[facility] ([address]) [address] [place] [address] [phone] Friday June 02, 2017 12:00 PM EDT  
PT Recurring Visit Letty Matter with Yael Mensah, PT  
[facility] ([address]) [address] [place] [id number] [phone] Thursday June 22, 2017  1:20 PM EDT New Patient PSG with PP ARIAN Hook [address] ([facility]) 11 [facility] [address] [place] [address] Kath [facility]  
[phone] Current Discharge Medication List  
  
ASK your doctor about these medications Dose & Instructions Dispensing Information Comments Morning Noon Evening Bedtime  
 amLODIPine 5 mg tablet Commonly known as:  [employer] Your last dose was: Your next dose is:    
   
   
 Dose:  5 mg Take 1 Tab by mouth daily. Quantity:  90 Tab Refills:  3  
     
   
   
   
  
 atorvastatin 20 mg tablet Commonly known as:  LIPITOR Your last dose was: Your next dose is:    
   
   
 Dose:  20 mg Take 1 Tab by mouth daily. Quantity:  90 Tab Refills:  3  
     
   
   
   
  
 lisinopril-hydroCHLOROthiazide 20-25 mg per tablet Commonly known as:  Jemma Doug Your last dose was: Your next dose is:    
   
   
 Dose:  1 Tab Take 1 Tab by mouth daily. Quantity:  90 Tab Refills:  3  
     
   
   
   
  
 varenicline 1 mg tablet Commonly known as:  Terrance Dean Your last dose was: Your next dose is:    
   
   
 Dose:  1 mg Take 1 Tab by mouth two (2) times a day. Quantity:  60 Tab Refills:  5 Discharge Instructions ACTIVITY · As tolerated and as directed by your doctor. · Bathe or shower as directed by your doctor. DIET · Clear liquids until no nausea or vomiting; then light diet for the first day. · Advance to regular diet on second day, unless your doctor orders otherwise. · If nausea and vomiting continues, call your doctor. PAIN 
· Take pain medication as directed by your doctor. · Call your doctor if pain is NOT relieved by medication. · DO NOT take aspirin of blood thinners unless directed by your doctor. DRESSING CARE  
 
 
CALL YOUR DOCTOR IF  
· Excessive bleeding that does not stop after holding pressure over the area · Temperature of 101 degrees F or above · Excessive redness, swelling or bruising, and/ or green or yellow, smelly discharge from incision AFTER ANESTHESIA · For the first 24 hours: DO NOT Drive, Drink alcoholic beverages, or Make important decisions. · Be aware of dizziness following anesthesia and while taking pain medication. APPOINTMENT DATE/ TIME 
 
YOUR DOCTOR'S PHONE NUMBER  
 
 
DISCHARGE SUMMARY from Nurse PATIENT INSTRUCTIONS: 
 
After general anesthesia or intravenous sedation, for 24 hours or while taking prescription Narcotics: · Limit your activities · Do not drive and operate hazardous machinery · Do not make important personal or business decisions · Do  not drink alcoholic beverages · If you have not urinated within 8 hours after discharge, please contact your surgeon on call. *  Please give a list of your current medications to your Primary Care Provider. *  Please update this list whenever your medications are discontinued, doses are 
    changed, or new medications (including over-the-counter products) are added. *  Please carry medication information at all times in case of emergency situations. These are general instructions for a healthy lifestyle: No smoking/ No tobacco products/ Avoid exposure to second hand smoke Surgeon General's Warning:  Quitting smoking now greatly reduces serious risk to your health. Obesity, smoking, and sedentary lifestyle greatly increases your risk for illness A healthy diet, regular physical exercise & weight monitoring are important for maintaining a healthy lifestyle You may be retaining fluid if you have a history of heart failure or if you experience any of the following symptoms:  Weight gain of 3 pounds or more overnight or 5 pounds in a week, increased swelling in our hands or feet or shortness of breath while lying flat in bed.   Please call your doctor as soon as you notice any of these symptoms; do not wait until your next office visit. Recognize signs and symptoms of STROKE: 
 
F-face looks uneven A-arms unable to move or move unevenly S-speech slurred or non-existent T-time-call 911 as soon as signs and symptoms begin-DO NOT go Back to bed or wait to see if you get better-TIME IS BRAIN. Post-Operative Instructions For Shoulder Arthroscopy Rotator Cuff Repair and Right elbow nerve rtelease Phone:  [phone] 1. If you do not have an \"Iceman\" type cooling unit, for the first 48-72 hours following surgery, use ice on the shoulder every two hours (while awake) for 20-30 minutes at a time to help prevent swelling and lessen pain. If you have a cooling unit, follow the instructions given to you- continually as much as possible the first 48-72 hours, then 3-4 times a day for 4 weeks. 2. You may shower after the arthroscopy. Keep dressings in place for the first three days on the shoulder. After three days, you may remove the dressings and leave the steri-strip bandages in place; they will peel off naturally. 3. Stay in sling at all times except to shower. 4. Begin therapy as ordered. 5. Use any pain medication as instructed. You should take your pain medication as soon as you feel the anesthetic wearing off. Do not wait until you are in severe pain to begin taking your pain medication. 6. You may have some side effects from your pain medication. If you have nausea, try taking your medication with food. For itching, you may take over the counter Benadryl. 7. You may have been given a prescription for Zofran or Phenergan. This medication is used for nausea and vomiting. You do not need to get this prescription filled unless you have a problem. 8. If you have a problem, please call [address] [place] [address] at [phone] 
 
9. For the elbow leave the dressings in place for 14 days.   During that time when you shower wrap the dressings in saran wrap to keep them dry. You may actively move the elbow as tolerated. Neo Maciel [facility] [employer] Discharge Orders None Introducing [facility] SERVICES! Elma Giraldo introduces [employer] patient portal. Now you can access parts of your medical record, email your doctor's office, and request medication refills online. 1. In your internet browser, go to [email or web address]. [employer] 2. Click on the First Time User? Click Here link in the Sign In box. You will see the New Member Sign Up page. 3. Enter your [employer] Access Code exactly as it appears below. You will not need to use this code after youve completed the sign-up process. If you do not sign up before the expiration date, you must request a new code. · [employer] Access Code: [email or web address] Expires: 8/6/2017  2:39 PM 
 
4. Enter the last four digits of your Social Security Number (xxxx) and Date of Birth (mm/dd/yyyy) as indicated and click Submit. You will be taken to the next sign-up page. 5. Create a [employer] ID. This will be your [employer] login ID and cannot be changed, so think of one that is secure and easy to remember. 6. Create a [employer] password. You can change your password at any time. 7. Enter your Password Reset Question and Answer. This can be used at a later time if you forget your password. 8. Enter your e-mail address. You will receive e-mail notification when new information is available in [address] 9. Click Sign Up. You can now view and download portions of your medical record. 10. Click the Download Summary menu link to download a portable copy of your medical information. If you have questions, please visit the Frequently Asked Questions section of the [employer] website. Remember, [employer] is NOT to be used for urgent needs. For medical emergencies, dial 911. Now available from your iPhone and Android! General Information Please provide this summary of care documentation to your next provider. Patient Signature:  ____________________________________________________________ Date:  ____________________________________________________________  
  
Arna Star Provider Signature:  ____________________________________________________________ Date:  ____________________________________________________________

## 2017-05-19 NOTE — H&P
Outpatient Surgery History and Physical:  Eun Fuentes Sr. was seen and examined. CHIEF COMPLAINT:   Right shoulder and right elbow pain. PE:   There were no vitals taken for this visit. Heart:   Regular rhythm      Lungs:  Are clear      Past Medical History: There are no active problems to display for this patient. Surgical History:   Past Surgical History:   Procedure Laterality Date    HX OTHER SURGICAL      testicle twisted at age 7   Ramona Joce WISDOM TEETH EXTRACTION         Social History: Patient  reports that he has been smoking. He has been smoking about 0.25 packs per day. He has never used smokeless tobacco. He reports that he drinks about 10.8 oz of alcohol per week  He reports that he does not use illicit drugs. Family History:   Family History   Problem Relation Age of Onset   [facility] Breast Cancer Mother     Hypertension Mother     Cancer Mother     Hypertension Father     Heart Disease Father      Enlarged Heart       Allergies: Reviewed per EMR  Allergies   Allergen Reactions    Penicillins Unable to Obtain    Sulfa (Sulfonamide Antibiotics) Unable to Obtain       Medications:    No current facility-administered medications on file prior to encounter. Current Outpatient Prescriptions on File Prior to Encounter   Medication Sig    atorvastatin (LIPITOR) 20 mg tablet Take 1 Tab by mouth daily. (Patient taking differently: Take 20 mg by mouth daily. Indications: hypercholesterolemia)    varenicline (CHANTIX) 1 mg tablet Take 1 Tab by mouth two (2) times a day.  lisinopril-hydroCHLOROthiazide (PRINZIDE, ZESTORETIC) 20-25 mg per tablet Take 1 Tab by mouth daily.  amLODIPine (NORVASC) 5 mg tablet Take 1 Tab by mouth daily. (Patient taking differently: Take 5 mg by mouth daily. Indications: hypertension)       The surgery is planned for the right elbow and right shoulder. History and physical has been reviewed. The patient has been examined.  There have been no significant clinical changes since the completion of the originally dated History and Physical.  Patient identified by surgeon; surgical site was confirmed by patient and surgeon. The patient is here today for outpatient surgery. I have examined the patient, no changes are noted in the patient's medical status. Necessity for the procedure/care is still present and the history and physical above is current. See the office notes for the full long term history of the problem. Please see the recent office notes for the musculoskeletal examination.     Signed By: NOREEN Malave     May 19, 2017 10:19 AM

## 2017-05-19 NOTE — DISCHARGE INSTRUCTIONS
ACTIVITY  · As tolerated and as directed by your doctor. · Bathe or shower as directed by your doctor. DIET  · Clear liquids until no nausea or vomiting; then light diet for the first day. · Advance to regular diet on second day, unless your doctor orders otherwise. · If nausea and vomiting continues, call your doctor. PAIN  · Take pain medication as directed by your doctor. · Call your doctor if pain is NOT relieved by medication. · DO NOT take aspirin of blood thinners unless directed by your doctor. DRESSING CARE       CALL YOUR DOCTOR IF   · Excessive bleeding that does not stop after holding pressure over the area  · Temperature of 101 degrees F or above  · Excessive redness, swelling or bruising, and/ or green or yellow, smelly discharge from incision    AFTER ANESTHESIA   · For the first 24 hours: DO NOT Drive, Drink alcoholic beverages, or Make important decisions. · Be aware of dizziness following anesthesia and while taking pain medication. APPOINTMENT DATE/ TIME    YOUR DOCTOR'S PHONE NUMBER       DISCHARGE SUMMARY from Nurse    PATIENT INSTRUCTIONS:    After general anesthesia or intravenous sedation, for 24 hours or while taking prescription Narcotics:  · Limit your activities  · Do not drive and operate hazardous machinery  · Do not make important personal or business decisions  · Do  not drink alcoholic beverages  · If you have not urinated within 8 hours after discharge, please contact your surgeon on call. *  Please give a list of your current medications to your Primary Care Provider. *  Please update this list whenever your medications are discontinued, doses are      changed, or new medications (including over-the-counter products) are added. *  Please carry medication information at all times in case of emergency situations.       These are general instructions for a healthy lifestyle:    No smoking/ No tobacco products/ Avoid exposure to second hand smoke    Surgeon General's Warning:  Quitting smoking now greatly reduces serious risk to your health. Obesity, smoking, and sedentary lifestyle greatly increases your risk for illness    A healthy diet, regular physical exercise & weight monitoring are important for maintaining a healthy lifestyle    You may be retaining fluid if you have a history of heart failure or if you experience any of the following symptoms:  Weight gain of 3 pounds or more overnight or 5 pounds in a week, increased swelling in our hands or feet or shortness of breath while lying flat in bed. Please call your doctor as soon as you notice any of these symptoms; do not wait until your next office visit. Recognize signs and symptoms of STROKE:    F-face looks uneven    A-arms unable to move or move unevenly    S-speech slurred or non-existent    T-time-call 911 as soon as signs and symptoms begin-DO NOT go       Back to bed or wait to see if you get better-TIME IS BRAIN. Post-Operative Instructions   For  Shoulder Arthroscopy Rotator Cuff Repair and Right elbow nerve rtelease  Phone:  [phone]    1. If you do not have an \"Iceman\" type cooling unit, for the first 48-72 hours following surgery, use ice on the shoulder every two hours (while awake) for 20-30 minutes at a time to help prevent swelling and lessen pain. If you have a cooling unit, follow the instructions given to you- continually as much as possible the first 48-72 hours, then 3-4 times a day for 4 weeks. 2. You may shower after the arthroscopy. Keep dressings in place for the first three days on the shoulder. After three days, you may remove the dressings and leave the steri-strip bandages in place; they will peel off naturally. 3. Stay in sling at all times except to shower. 4. Begin therapy as ordered. 5. Use any pain medication as instructed. You should take your pain medication as soon as you feel the anesthetic wearing off.   Do not wait until you are in severe pain to begin taking your pain medication. 6. You may have some side effects from your pain medication. If you have nausea, try taking your medication with food. For itching, you may take over the counter Benadryl. 7. You may have been given a prescription for Zofran or Phenergan. This medication is used for nausea and vomiting. You do not need to get this prescription filled unless you have a problem. 8. If you have a problem, please call [address] [place] [address] at [phone]    9. For the elbow leave the dressings in place for 14 days. During that time when you shower wrap the dressings in saran wrap to keep them dry. You may actively move the elbow as tolerated. [address] [facility] [address] [facility]

## 2017-05-19 NOTE — OP NOTES
[facility] 66   OPERATIVE REPORT       Name:  Julian Zaidi   MR#:  [id number]   :  1980   Account #:  [de-identified]   Date of Adm:  2017       DATE OF SURGERY: 2017    PREOPERATIVE DIAGNOSES    1. Rotator cuff tear, right shoulder. 2. Acromioclavicular arthritis, right shoulder. 3. Labral tear, right shoulder. 4. Impingement, right shoulder. 5. Cubital tunnel syndrome, right elbow. POSTOPERATIVE DIAGNOSES   1. Rotator cuff tear, right shoulder. 2. Acromioclavicular arthritis, right shoulder. 3. Labral tear, right shoulder. 4. Impingement, right shoulder. 5. Cubital tunnel syndrome, right elbow. OPERATIONS PERFORMED    1. Arthroscopic rotator cuff repair, right shoulder. 2. Arthroscopic distal clavicle excision (Micah procedure),   right shoulder. 3. Debridement of labral tear, right shoulder. 4. Arthroscopic subacromial decompression, right shoulder. 5. Release of ulnar nerve, right elbow. SURGEON: Shay Renteria MD    ASSISTANT: Adam Gautam. [facility] [place]    ANESTHESIA: General.    FLUIDS: Crystalloid. ESTIMATED BLOOD LOSS: Minimal.    FINDINGS: The shoulder showed a high-grade partial tear with a   near full-thickness component of the posterior supraspinatus   encompassing approximately 9 to 10 mm. The biceps was intact. There was some anterior labral tearing. There was no   glenohumeral arthritis. There was an anterior/inferior acromial   slope. There was undersurface osteophyte formation and   degenerative change of the distal clavicle at the [facility] joint. DESCRIPTION OF PROCEDURE: After informed consent, the patient   was brought to the operating room and placed on the table in   supine position. General endotracheal anesthesia was   administered without difficulty. The patient was placed in the   lateral decubitus position. All pressure points were suspended   and padded appropriately.  The right upper extremity was   suspended in overhead traction. The right shoulder was prepped   and draped in sterile fashion. Glenohumeral joint was   insufflated with 50 mL of sterile saline and a posterior portal   was established. Glenohumeral joint was then arthroscoped in   sequential manner. The aforementioned findings were noted. An   anterior portal was established. Labral tearing was debrided   smooth. All loose flaps of tissue were removed. There were no   sharp edges or unstable fragments after the labral debridement. Undersurface rotator cuff tearing was debrided back to a smooth,   stable area. The scope was brought in the subacromial space. A   lateral portal was established. Bursal proliferative tissue was   excised. The undersurface of the acromion was exposed and an   acromioplasty was performed. All of the acromion anterior to the   leading edge of distal clavicle was excised to a depth of 5 to 6   mm and a 2 cm anterior to posterior direction was removed. This   opened up the subacromial space nicely. Attention was then   directed to the distal clavicle. Through both the anterior and   lateral portals, a circumferential distal clavicle excision was   performed. Approximately 10 mm of distal clavicle was excised. Circumferential excision was verified arthroscopically. The   Micah procedure was performed without difficulty. Attention   was directed to the rotator cuff. The high-grade partial tear   was converted to a full-thickness tear. The area underneath the   original insertion was lightly decorticated. A multifix anchor   and 2 Ultratape sutures in a mattress fashion were used to   completely repair the tear. Anatomic rotator cuff repair was   performed. The rotator cuff tear was repaired without   difficulty. Shoulder was thoroughly irrigated, portals were   closed. Sterile dressings were applied. Attention was directed to the right elbow.  A sterile tourniquet was   applied to the right upper arm and the upper extremity was taken   out of traction. The tourniquet was inflated and incision was   made over the medial aspect of the elbow and carried down to   subcutaneous tissue. The ulnar nerve was identified proximally   and released distally through the cubital tunnel. Complete   release of the nerve was performed. The nerve remained intact   throughout the procedure. The cubital tunnel release was   performed without difficulty. The elbow was thoroughly   irrigated, closed in layers. Sterile dressings were applied. The   patient was extubated and taken to the recovery room in stable   condition. Roena Lefort, PA, assisted during the procedure. He was necessary   for patient positioning, wound closure, and assistance with the   major portions of the operation including the rotator cuff   repair and the ulnar nerve release at the elbow. His presence   decreased the operative time and potential complication rate.         MD RUBI Gandhi / NUBIA   D:  05/19/2017   13:17   T:  05/19/2017   13:39   Job #:  [id number]

## 2017-05-19 NOTE — ANESTHESIA PREPROCEDURE EVALUATION
Anesthetic History     PONV          Review of Systems / Medical History  Patient summary reviewed and pertinent labs reviewed    Pulmonary        Sleep apnea           Neuro/Psych   Within defined limits           Cardiovascular    Hypertension: well controlled              Exercise tolerance: >4 METS     GI/Hepatic/Renal     GERD: well controlled           Endo/Other        Obesity     Other Findings              Physical Exam    Airway  Mallampati: III  TM Distance: > 6 cm  Neck ROM: normal range of motion   Mouth opening: Normal     Cardiovascular    Rhythm: regular           Dental  No notable dental hx       Pulmonary                 Abdominal  GI exam deferred       Other Findings            Anesthetic Plan    ASA: 3  Anesthesia type: general - interscalene block          Induction: Intravenous  Anesthetic plan and risks discussed with: Patient

## 2017-05-19 NOTE — ANESTHESIA PROCEDURE NOTES
Peripheral Block    Start time: 5/19/2017 10:19 AM  End time: 5/19/2017 10:21 AM  Performed by: Marianna Lira  Authorized by: Marianna Lira       Pre-procedure: Indications: at surgeon's request and post-op pain management    Preanesthetic Checklist: patient identified, risks and benefits discussed, site marked, timeout performed, anesthesia consent given and patient being monitored    Timeout Time: 10:19          Block Type:   Block Type:   Interscalene  Laterality:  Right  Monitoring:  Standard ASA monitoring, continuous pulse ox, frequent vital sign checks, heart rate, oxygen and responsive to questions  Injection Technique:  Single shot  Procedures: nerve stimulator    Patient Position: supine  Prep: chlorhexidine    Location:  Interscalene  Needle Type:  Stimuplex  Needle Gauge:  21 G  Needle Localization:  Anatomical landmarks, nerve stimulator and ultrasound guidance  Motor Response: minimal motor response >0.4 mA    Medication Injected:  0.5%  ropivacaine  Volume (mL):  30    Assessment:  Number of attempts:  1  Injection Assessment:  Incremental injection every 5 mL, local visualized surrounding nerve on ultrasound, negative aspiration for blood, no paresthesia, no intravascular symptoms and ultrasound image on chart  Patient tolerance:  Patient tolerated the procedure well with no immediate complications

## 2017-05-19 NOTE — ANESTHESIA POSTPROCEDURE EVALUATION
Post-Anesthesia Evaluation and Assessment    Patient: Bridget Franco MRN: [id number]  SSN: [id number]    YOB: 1980  Age: 29 y.o. Sex: male       Cardiovascular Function/Vital Signs  Visit Vitals    BP (!) 244/108    Pulse 88    Temp 36.4 °C (97.6 °F)    Resp 16    Wt 124.8 kg (275 lb 2 oz)    SpO2 98%    BMI 37.31 kg/m2       Patient is status post general anesthesia for Procedure(s):  RIGHT SHOULDER ARTHROSCOPY/  ROTATOR CUFF REPAIR/ DECOMPRESSION /  DISTAL CLAVICLE EXCISION  RIGHT ELBOW CUBITAL TUNNEL RELEASE. Nausea/Vomiting: None    Postoperative hydration reviewed and adequate. Pain:  Pain Scale 1: Numeric (0 - 10) (05/19/17 1345)  Pain Intensity 1: 7 (05/19/17 1345)   Managed    Neurological Status: At baseline    Mental Status and Level of Consciousness: Arousable    Pulmonary Status:   O2 Device: Nasal cannula (05/19/17 1326)   Adequate oxygenation and airway patent    Complications related to anesthesia: None    Post-anesthesia assessment completed.  No concerns    Signed By: Annetta Fuentes MD     May 19, 2017

## 2017-05-19 NOTE — IP AVS SNAPSHOT
Current Discharge Medication List  
  
ASK your doctor about these medications Dose & Instructions Dispensing Information Comments Morning Noon Evening Bedtime  
 amLODIPine 5 mg tablet Commonly known as:  Anali Medici Your last dose was: Your next dose is:    
   
   
 Dose:  5 mg Take 1 Tab by mouth daily. Quantity:  90 Tab Refills:  3  
     
   
   
   
  
 atorvastatin 20 mg tablet Commonly known as:  LIPITOR Your last dose was: Your next dose is:    
   
   
 Dose:  20 mg Take 1 Tab by mouth daily. Quantity:  90 Tab Refills:  3  
     
   
   
   
  
 lisinopril-hydroCHLOROthiazide 20-25 mg per tablet Commonly known as:  Sharion Card Your last dose was: Your next dose is:    
   
   
 Dose:  1 Tab Take 1 Tab by mouth daily. Quantity:  90 Tab Refills:  3  
     
   
   
   
  
 varenicline 1 mg tablet Commonly known as:  Kandi Estrada Your last dose was: Your next dose is:    
   
   
 Dose:  1 mg Take 1 Tab by mouth two (2) times a day. Quantity:  60 Tab Refills:  5

## 2017-05-23 ENCOUNTER — APPOINTMENT (OUTPATIENT)
Dept: PHYSICAL THERAPY | Age: 37
End: 2017-05-23
Payer: COMMERCIAL

## 2017-05-26 ENCOUNTER — HOSPITAL ENCOUNTER (OUTPATIENT)
Dept: PHYSICAL THERAPY | Age: 37
Discharge: HOME | End: 2017-05-26
Payer: COMMERCIAL

## 2017-05-26 ENCOUNTER — APPOINTMENT (OUTPATIENT)
Dept: PHYSICAL THERAPY | Age: 37
End: 2017-05-26
Payer: COMMERCIAL

## 2017-05-26 PROCEDURE — 97140 MANUAL THERAPY 1/> REGIONS: CPT

## 2017-05-26 NOTE — PROGRESS NOTES
Donte Jorge  : 1980 [facility] at [facility] Therapy  [address], [address]  [phone]   [phone]         OUTPATIENT PHYSICAL THERAPY:Daily Note 2017    ICD-10: Treatment Diagnosis: Pain in right shoulder (M25.511)  Incomplete rotator cuff teaImpingement syndrome of right shoulder (M75.41)r or rupture of right shoulder, not specified as traumatic (M75.111)  Precautions/Allergies:   Penicillins and Sulfa (sulfonamide antibiotics)  Fall Risk Score: 1 (? 5 = High Risk)  MD Orders: Evaluate and treat MEDICAL/REFERRING DIAGNOSIS:  Impingement syndrome of right shoulder [M75.41]  Incomplete rotator cuff tear or rupture of right shoulder, not specified as traumatic [M75.111]   DATE OF ONSET: chronic  REFERRING PHYSICIAN: Lona Chapman MD  RETURN PHYSICIAN APPOINTMENT: 17     INITIAL ASSESSMENT:  Mr. Nella Lora presents with chronic R shoulder pain with overhead and lifting ADL's with recent worsening of symptoms. His workout regimen of weight lifting is likely contributing to symptoms and may affect recovery without more modification of routine. He has decreased mobility with shoulder extension and IR along with decreased strength rotator cuff/scapular musculature and associated pain. He has incomplete RTC tear and is consulting doctor about possible surgery at same time as cubital tunnel surgery. He can benefit from skilled therapy to address the above deficits along with education on posture and modification of weight lifting routine. PROBLEM LIST (Impacting functional limitations):  1. Decreased Strength  2. Decreased ADL/Functional Activities  3. Increased Pain  4. Decreased Flexibility/Joint Mobility  5. Decreased [place] with Home Exercise Program INTERVENTIONS PLANNED:  1. Home Exercise Program (HEP)  2. Manual Therapy  3. Range of Motion (ROM)  4.  Therapeutic Exercise/Strengthening  5. modalities/taping as indicated TREATMENT PLAN:  Effective Dates: 4/17/17 TO 7/17/17. Frequency/Duration: 2 times a week for 12 weeks  GOALS: (Goals have been discussed and agreed upon with patient.)  Short-Term Functional Goals: Time Frame: 2-4 weeks  1. Decrease pain by 2/10 to allow greater ease with cross-body and overhead ADL's  2. Increase ROM shoulder extension by 10 ° for decreased pain with functional movts  3. Increase strength shoulder ER by 1/2 grade to decrease shoulder impingement and pain with lifting ADL's  4. [place] in HEP with minimal cueing. 5. Patient to be aware of postural habits contributing to abnormal shoulder mechancies  DISCHARGE GOALS: Time Frame: 12 weeks  1. Decrease DASH 9 points for greater ease with all ADL's and recreational activities, including weight lifting. 2. Increase strength of rotator cuff and scapula musculature to WDL for greater ease/minimal pain with overhead and reaching UE ADL's  3. Increase ROM shoulder extension and IR to WDL for improved mechanics and functional mobility ADL's (reahcing behind back)  4. Demonstrate independence in advanced home exercise program to allow DC from physical therapy. Rehabilitation Potential For Stated Goals: Fair  Regarding Demario Vance Sr.'s therapy, I certify that the treatment plan above will be carried out by a therapist or under their direction. Thank you for this referral,  Alice Soto     Referring Physician Signature: Nicky Huitron MD              Date                    The information in this section was collected on 4/17/17 (except where otherwise noted). HISTORY:   History of Present Injury/Illness (Reason for Referral):  Patient reports pain in R shoulder on and off for past couple of years, with recent worsening of symptoms in past couple of months. Also reports intermittent popping/clicking with certain movts. Pain mostly present with reaching overhead, lifting/carrying heavy objects.  Sometimes it will ache for part of the day. Sometimes painful at night. 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 Works out 5 days/week doing weight lifting - has modified this recently due to shoulder pain (normally would do bench press, incline press, lat pull-downs, rows, front and lateral raises etc. Has stopped pull-ups due to pain and used lighter weights for other ex's. Also has R elbow pain with numbness in lateral fingers - cubital tunnel syndrome which he will have surgery for. States that he is waiting for return call from doctor regarding possibly having shoulder surgery at the same time if he is going to need it at later time anyway. Had cortisone shot 2 weeks ago with no relief of pain and possibly worsening of symptoms. Past Medical History/Comorbidities:   Mr. Zambrano  has a past medical history of Arthritis; GERD (gastroesophageal reflux disease); HTN (hypertension); and Nausea & vomiting. He also has no past medical history of Difficult intubation; Malignant hyperthermia due to anesthesia; or Pseudocholinesterase deficiency. Mr. Zambrano  has a past surgical history that includes wisdom teeth extraction and other surgical.  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 (Patient taking differently: Take 20 mg by mouth daily. Indications: hypercholesterolemia), Disp: 90 Tab, Rfl: 3    varenicline (CHANTIX) 1 mg tablet, Take 1 Tab by mouth two (2) times a day., Disp: 60 Tab, Rfl: 5    lisinopril-hydroCHLOROthiazide (PRINZIDE, ZESTORETIC) 20-25 mg per tablet, Take 1 Tab by mouth daily. , Disp: 90 Tab, Rfl: 3    amLODIPine (NORVASC) 5 mg tablet, Take 1 Tab by mouth daily. (Patient taking differently: Take 5 mg by mouth daily. Indications: hypertension), Disp: 90 Tab, Rfl: 3   Date Last Reviewed:  5/26/2017     Number of Personal Factors/Comorbidities that affect the Plan of Care: 1-2: MODERATE COMPLEXITY   EXAMINATION:   Observation/Orthostatic Postural Assessment: Very muscular build with both arms held in abducted position. Moderately rounded shoulders  Palpation:    Moderate tenderness over rot cuff insertion  ROM:       LUE Assessment  LUE Assessment (WDL): Exceptions to WDL  LUE AROM  L Shoulder Flexion: 160  L Shoulder Extension: 35  L Shoulder Internal Rotation: 50  L Shoulder External Rotation: 95              RUE Assessment  RUE Assessment (WDL): Exception to WDL  RUE AROM  R Shoulder Flexion: 155 (pain at 140 deg)  R Shoulder Extension: 30 (pain end range)  R Shoulder Internal Rotation: 45 (pain with IR behind back)  R Shoulder External Rotation: 90 (pain end range)      Strength:    LUE Strength, Tone, Sensation  LUE Strength, Tone, Sensation (WDL): Within defined limits        RUE Strength, Tone, Sensation  RUE Strength, Tone, Sensation (WDL): Exception to WDL  RUE Strength  R Shoulder Flexion: 4+ (pain)  R Shoulder ABduction: 4+  R Shoulder External Rotation: 4- (mod pain)            Special Tests: +ve impingement tests  Neurological Screen:  WDL  (except for int numbness 4th and 5th fingers due to cubital tunnel syndrome)  Functional Mobility:      marked limitation in IR behind back with increased pain (to L-S area)  Balance: WDL   Body Structures Involved:  1. Nerves  2. Joints  3. Muscles  4. Ligaments Body Functions Affected:  1. Sensory/Pain  2. Neuromusculoskeletal  3. Movement Related Activities and Participation Affected:  1. General Tasks and Demands  2. Mobility  3.  Self Care   Number of elements (examined above) that affect the Plan of Care: 3: MODERATE COMPLEXITY   CLINICAL PRESENTATION:   Presentation: Evolving clinical presentation with changing clinical characteristics: MODERATE COMPLEXITY   CLINICAL DECISION MAKING:   Outcome Measure: Tool Used: Disabilities of the Arm, Shoulder and Hand (DASH) Questionnaire - Quick Version  Score:  Initial: 20/55  Most Recent: X/55 (Date: -- )   Interpretation of Score: The DASH is designed to measure the activities of daily living in person's with upper extremity dysfunction or pain. Each section is scored on a 1-5 scale, 5 representing the greatest disability. The scores of each section are added together for a total score of 55. Score 11 12-19 20-28 29-37 38-45 46-54 55   Modifier CH CI CJ CK CL CM CN       Medical Necessity:   · Patient is expected to demonstrate progress in strength, range of motion and functional technique to increase independence with all UE ADL's. · Skilled intervention continues to be required due to pain, decreased ROM & strength limiting UE ADL's. Reason for Services/Other Comments:  · Patient continues to continues to require modification of therapeutic interventions to increase complexity of exercises. · Patient continues to require skilled intervention due to pain, decreased ROM, and strength limiting UE ADL's. Use of outcome tool(s) and clinical judgement create a POC that gives a: Questionable prediction of patient's progress: MODERATE COMPLEXITY            TREATMENT:   (In addition to Assessment/Re-Assessment sessions the following treatments were rendered)  Pre-treatment Symptoms/Complaints: Patient reports shoulder has been aching most of the day and has been really sore, but he has been able to sleep in the bed the last couple of days. Pain: Initial:   Pain Intensity 1: 8  Post Session:  7     Therapeutic Exercise: ( min ):  Exercises as outlined below to improve mobility and strength. Required moderate verbal and tactile cues to promote proper body posture and promote proper body mechanics. Progressed resistance, range and repetitions as indicated.      wilfrid shoulder extension, blueTBand, pain-free range, 2 x 10  Side ER , 1# , 2 x 10  Supine shoulder protraction, 10# kettlebell, 2 x 10  Supine shoulder circumduction, 10#, 1 x 10 each eay  Standing passive shoulder extension stretching with cane - reviewed  Prone sh ext, 10#, 2 x 10  Prone horiz abd, 3#, 2 x 8  Sh ER, red Tband, 2 x 10 (1 set with sh in 70 deg flex)  Sh IR, blue Tband, 2 x 10  Shoulder retraction, blue TBand, 2 x 10  [place] to 90 deg, 5# , 2 x 10 (1 set with green TBand)  Sitting push-ups, 2 x 10  Wall \"spiderman walks) with red Tband  Reinforced postural correction in sitting and standing and its contribution in facilitating normal shoulder mechanics. Discussed weight training modification  (reviewed)  Manual Therapy (45 min): Passive stretching for flexion, pendulum, stretching in elbow, and ball squeeze. Therapeutic Modalities: encouraged to use ice at home     Treatment/Session Assessment:    · Response to Treatment:  Patient had surgery last Friday. Patient indicates he has returned to work and has been able to return to sleeping in the bed, but still has a good amount of pain. Deferred measurements until next visit, but consulted with PT Byron Swanson) on passive ROM, pendulum and ball squeezing. Instructed patient to continue to use ice as needed. · Compliance with Program/Exercises: Compliant with HEP. · Recommendations/Intent for next treatment session: \"Next visit will focus on advancements to more challenging activities\". Stretching to increase shoulder extension, IR ROM. FREQUENCY/DURATION: Follow patient 2 times a week for 12 weeks to address above goals, and upon reassessment will adjust frequency and duration as progress indicates.   Total Treatment Duration:  PT Patient Time In/Time Out  Time In: 0400  Time Out: 7080  No. Of Visits: [address] [place], [place]

## 2017-05-31 ENCOUNTER — HOSPITAL ENCOUNTER (OUTPATIENT)
Dept: PHYSICAL THERAPY | Age: 37
Discharge: HOME | End: 2017-05-31
Payer: COMMERCIAL

## 2017-05-31 PROCEDURE — 97140 MANUAL THERAPY 1/> REGIONS: CPT

## 2017-05-31 NOTE — PROGRESS NOTES
Louis Henning Sr.  : 1980 [facility] at [place] [address] Therapy  [address], [address]  [phone]   [phone]         OUTPATIENT PHYSICAL THERAPY:Daily Note, Progress Report and Recertification     ICD-10: Treatment Diagnosis: Pain in right shoulder (M25.511)  Incomplete rotator cuff teaImpingement syndrome of right shoulder (M75.41)r or rupture of right shoulder, not specified as traumatic (M75.111)  Precautions/Allergies:   Penicillins and Sulfa (sulfonamide antibiotics)  Fall Risk Score: 1 (? 5 = High Risk)  MD Orders: Evaluate and treat MEDICAL/REFERRING DIAGNOSIS:  Impingement syndrome of right shoulder [M75.41]  Incomplete rotator cuff tear or rupture of right shoulder, not specified as traumatic [M75.111]   DATE OF ONSET: chronic  REFERRING PHYSICIAN: Garret Díaz MD  RETURN PHYSICIAN APPOINTMENT: 17     ASSESSMENT:  Mr. Effie Vega initially presented with chronic R shoulder pain with overhead and lifting ADL's with recent worsening of symptoms. His workout regimen of weight lifting was likely contributing to symptoms. . He had decreased mobility with shoulder extension and IR along with decreased strength rotator cuff/scapular musculature and associated pain. He was seen for 4 visits prior to undergoing surgery on 17 to repair rotator cuff, labral tear, along with subacromial decompression. Additionally he had ulnar nerve release on same arm for cubital tunnel syndrome. He is now returning for physical therapy following surgery to gradually restore ROM with later progression to strengthening and restoration of functional use of R UE. PROBLEM LIST (Impacting functional limitations):  1. Decreased Strength  2. Decreased ADL/Functional Activities  3. Increased Pain  4. Decreased Flexibility/Joint Mobility  5. Decreased [place] with Home Exercise Program INTERVENTIONS PLANNED:  1. Home Exercise Program (HEP)  2.  Manual Therapy  3. Range of Motion (ROM)  4. Therapeutic Exercise/Strengthening  5. modalities/taping as indicated   TREATMENT PLAN:  Effective Dates: 5/31/17 TO 8/31/17. Frequency/Duration: 2 times a week for 12 weeks  GOALS: (Goals have been discussed and agreed upon with patient.)  - goals revised due to recent surgery  Short-Term Functional Goals: Time Frame: 2-4 weeks  1. Increase shoulder flexion PROM to >140 deg and ER >45 deg to allow full AROM shoulder  2. Decrease pain level by 2/10 for reaching and lifting ADL's  3. [place] with HEP with minimal cueing  4. Patient to be aware of post-op precautions with posture, positioning, and movt to protect repair. DISCHARGE GOALS: Time Frame: 12 weeks  1. Restore full ROM R shoulder for performance of all UE ADL's  2. Patient to report minimal levels of pain with normal ADL's  3. Increase strength to at least 4/5 in all muscle groups for return to recreational activities  4. Improve score on DASH by > 8 points for ease with dressing and lifting ADL's  5. [place] with advanced HEP with no cueing  Rehabilitation Potential For Stated Goals: GOOD  Regarding Leigh Devorah .'s therapy, I certify that the treatment plan above will be carried out by a therapist or under their direction. Thank you for this referral,  Babs Patricio PT     Referring Physician Signature: Bandar Moore MD              Date                    The information in this section was collected on 4/17/17 (except where otherwise noted). HISTORY:   History of Present Injury/Illness (Reason for Referral):  Patient reports pain in R shoulder on and off for past couple of years, with recent worsening of symptoms in past couple of months. Also reports intermittent popping/clicking with certain movts. Pain mostly present with reaching overhead, lifting/carrying heavy objects. Sometimes it will ache for part of the day. Sometimes painful at night.  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 Works out 5 days/week doing weight lifting - has modified this recently due to shoulder pain (normally would do bench press, incline press, lat pull-downs, rows, front and lateral raises etc. Has stopped pull-ups due to pain and used lighter weights for other ex's. Also has R elbow pain with numbness in lateral fingers - cubital tunnel syndrome which he will have surgery for. States that he is waiting for return call from doctor regarding possibly having shoulder surgery at the same time if he is going to need it at later time anyway. Had cortisone shot 2 weeks ago with no relief of pain and possibly worsening of symptoms. 5/31/17: Patient had surgery 5/19 - has been in sling since then. Took pain meds a few days but no longer taking anything. Pain moderate - mostly bothersome at night. Still has some numbness in lateral 2 fingers. Past Medical History/Comorbidities:   Mr. Nella Lora  has a past medical history of Arthritis; GERD (gastroesophageal reflux disease); HTN (hypertension); and Nausea & vomiting. He also has no past medical history of Difficult intubation; Malignant hyperthermia due to anesthesia; or Pseudocholinesterase deficiency. Mr. Nella Lora  has a past surgical history that includes wisdom teeth extraction and other surgical.  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 (Patient taking differently: Take 20 mg by mouth daily.  Indications: hypercholesterolemia), Disp: 90 Tab, Rfl: 3    varenicline (CHANTIX) 1 mg tablet, Take 1 Tab by mouth two (2) times a day., Disp: 60 Tab, Rfl: 5    lisinopril-hydroCHLOROthiazide (PRINZIDE, ZESTORETIC) 20-25 mg per tablet, Take 1 Tab by mouth daily. , Disp: 90 Tab, Rfl: 3    amLODIPine (NORVASC) 5 mg tablet, Take 1 Tab by mouth daily. (Patient taking differently: Take 5 mg by mouth daily. Indications: hypertension), Disp: 90 Tab, Rfl: 3   Date Last Reviewed:  5/31/2017     Number of Personal Factors/Comorbidities that affect the Plan of Care: 1-2: MODERATE COMPLEXITY   EXAMINATION:   Observation/Orthostatic Postural Assessment: Very muscular build with moderately rounded shoulders. R UE immobilised in sling with abduction pillow. Bandaging around elbow, forearm. Mild residual swelling. Palpation:    Moderate generalised tenderness s/ surgery  ROM:       LUE Assessment  LUE Assessment (WDL): Exceptions to WDL  LUE AROM  L Shoulder Flexion: 160  L Shoulder Extension: 35  L Shoulder Internal Rotation: 50  L Shoulder External Rotation: 95              RUE Assessment  RUE Assessment (WDL): Exception to WDL  RUE PROM  R Shoulder Flexion: 40   R shoulder abduction 45  R Shoulder Internal Rotation: 25  R Shoulder External Rotation: 20   R elbow ext - 5  Mod to marked pain all movts  Not fully tested due to surgery      Strength:    LUE Strength, Tone, Sensation  LUE Strength, Tone, Sensation (WDL): Within defined limits        RUE Strength, Tone, Sensation  RUE Strength, Tone, Sensation (WDL): Exception to WDL  RUE Strength nt due to recent surgery             Special Tests:   Neurological Screen:  WDL  (except for mild numbness 4th and 5th fingers due to cubital tunnel syndrome)  Functional Mobility:      limited with all UE ADL's  Balance: WDL   Body Structures Involved:  1. Nerves  2. Joints  3. Muscles  4. Ligaments Body Functions Affected:  1. Sensory/Pain  2. Neuromusculoskeletal  3. Movement Related Activities and Participation Affected:  1. General Tasks and Demands  2. Mobility  3.  Self Care   Number of elements (examined above) that affect the Plan of Care: 3: MODERATE COMPLEXITY CLINICAL PRESENTATION:   Presentation: Evolving clinical presentation with changing clinical characteristics: MODERATE COMPLEXITY   CLINICAL DECISION MAKING:   Outcome Measure: Tool Used: Disabilities of the Arm, Shoulder and Hand (DASH) Questionnaire - Quick Version  Score:  Initial: 20/55  Most Recent: 41/55 (Date: 5/31/17 )   Interpretation of Score: The DASH is designed to measure the activities of daily living in person's with upper extremity dysfunction or pain. Each section is scored on a 1-5 scale, 5 representing the greatest disability. The scores of each section are added together for a total score of 55. Score 11 12-19 20-28 29-37 38-45 46-54 55   Modifier CH CI CJ CK CL CM CN   ** decreased score due to recent surgery    Medical Necessity:   · Patient is expected to demonstrate progress in strength, range of motion and functional technique to increase independence with all UE ADL's. · Skilled intervention continues to be required due to pain, decreased ROM & strength limiting UE ADL's. Reason for Services/Other Comments:  · Patient continues to continues to require modification of therapeutic interventions to increase complexity of exercises. · Patient continues to require skilled intervention due to pain, decreased ROM, and strength limiting UE ADL's. Use of outcome tool(s) and clinical judgement create a POC that gives a: Questionable prediction of patient's progress: MODERATE COMPLEXITY            TREATMENT:   (In addition to Assessment/Re-Assessment sessions the following treatments were rendered)  Pre-treatment Symptoms/Complaints: Patient reports shoulder pain not too bad, mostly at night and if moves it inadvertently. Pain: Initial:   Pain Intensity 1: 7  Post Session:  7     Therapeutic Exercise: ( ):  Exercises as outlined below to improve mobility and strength. Required moderate verbal and tactile cues to promote proper body posture and promote proper body mechanics. Progressed resistance, range and repetitions as indicated. Reviewed pendulum exercises, self-assisted PROM for flexion, abd, ER/IR  Manual Therapy (45 min): Gentle PROM for sh flexion, abduction, ER/IR in scapular plane(45 deg abd) ~ ltd to 30 deg, Isometrics flex, ext, abd, IR/ER. Rhythmic stab IR/ER. Passive and act assisted elbow ROM, wrist/hand AROM & ball squeeze. Therapeutic Modalities: encouraged to use ice at home, particularly after exercise    Treatment/Session Assessment:    · Response to Treatment:  Patient tolerated treatment with moderate to marked difficulty due to pain, especially with flexion ROM. Will gradually work on restoration of ROM and gentle strengthening to restore functional use of R UE.  · Compliance with Program/Exercises: Compliant with HEP. · Recommendations/Intent for next treatment session: \"Next visit will focus on gradual restoration of PROM with progression to [facility] and gentle strengthening over next several weeks within guidelines of rotator cuff rehabilitation protocol. FREQUENCY/DURATION: Follow patient 3 times a week for 12 weeks to address above goals, and upon reassessment will adjust frequency and duration as progress indicates.   Total Treatment Duration:  PT Patient Time In/Time Out  Time In: 1030  Time Out: 1115  No. Of Visits: [address], [place],

## 2017-06-02 ENCOUNTER — HOSPITAL ENCOUNTER (OUTPATIENT)
Dept: PHYSICAL THERAPY | Age: 37
Discharge: HOME | End: 2017-06-02
Payer: COMMERCIAL

## 2017-06-02 PROCEDURE — 97140 MANUAL THERAPY 1/> REGIONS: CPT

## 2017-06-02 NOTE — PROGRESS NOTES
Maggie Whitaker  : 1980 [facility] at [facility]  [address], [facility], [address]  [phone]   [phone]         OUTPATIENT PHYSICAL THERAPY:Daily Note 2017    ICD-10: Treatment Diagnosis: Pain in right shoulder (M25.511)  Incomplete rotator cuff teaImpingement syndrome of right shoulder (M75.41)r or rupture of right shoulder, not specified as traumatic (M75.111)    Precautions/Allergies:   Penicillins and Sulfa (sulfonamide antibiotics)  Fall Risk Score: 1 (? 5 = High Risk)  MD Orders: Evaluate and treat MEDICAL/REFERRING DIAGNOSIS:  Impingement syndrome of right shoulder [M75.41]  Incomplete rotator cuff tear or rupture of right shoulder, not specified as traumatic [M75.111]   DATE OF ONSET: chronic  REFERRING PHYSICIAN: Vero Stanton MD  RETURN PHYSICIAN APPOINTMENT: 17     ASSESSMENT:  Mr. Marielena Man initially presented with chronic R shoulder pain with overhead and lifting ADL's with recent worsening of symptoms. His workout regimen of weight lifting was likely contributing to symptoms. . He had decreased mobility with shoulder extension and IR along with decreased strength rotator cuff/scapular musculature and associated pain. He was seen for 4 visits prior to undergoing surgery on 17 to repair rotator cuff, labral tear, along with subacromial decompression. Additionally he had ulnar nerve release on same arm for cubital tunnel syndrome. He is now returning for physical therapy following surgery to gradually restore ROM with later progression to strengthening and restoration of functional use of R UE. PROBLEM LIST (Impacting functional limitations):  1. Decreased Strength  2. Decreased ADL/Functional Activities  3. Increased Pain  4. Decreased Flexibility/Joint Mobility  5. Decreased [place] with Home Exercise Program INTERVENTIONS PLANNED:  1. Home Exercise Program (HEP)  2. Manual Therapy  3.  Range of Motion (ROM)  4. Therapeutic Exercise/Strengthening  5. modalities/taping as indicated   TREATMENT PLAN:  Effective Dates: 5/31/17 TO 8/31/17. Frequency/Duration: 2 times a week for 12 weeks  GOALS: (Goals have been discussed and agreed upon with patient.)  - goals revised due to recent surgery  Short-Term Functional Goals: Time Frame: 2-4 weeks  1. Increase shoulder flexion PROM to >140 deg and ER >45 deg to allow full AROM shoulder  2. Decrease pain level by 2/10 for reaching and lifting ADL's  3. [place] with HEP with minimal cueing  4. Patient to be aware of post-op precautions with posture, positioning, and movt to protect repair. DISCHARGE GOALS: Time Frame: 12 weeks  1. Restore full ROM R shoulder for performance of all UE ADL's  2. Patient to report minimal levels of pain with normal ADL's  3. Increase strength to at least 4/5 in all muscle groups for return to recreational activities  4. Improve score on DASH by > 8 points for ease with dressing and lifting ADL's  5. [place] with advanced HEP with no cueing  Rehabilitation Potential For Stated Goals: GOOD  Regarding Aj Walton Sr.'s therapy, I certify that the treatment plan above will be carried out by a therapist or under their direction. Thank you for this referral,  Pietro Rodriguez PT     Referring Physician Signature: Le Velazco MD              Date                    The information in this section was collected on 4/17/17 (except where otherwise noted). HISTORY:   History of Present Injury/Illness (Reason for Referral):  Patient reports pain in R shoulder on and off for past couple of years, with recent worsening of symptoms in past couple of months. Also reports intermittent popping/clicking with certain movts. Pain mostly present with reaching overhead, lifting/carrying heavy objects. Sometimes it will ache for part of the day. Sometimes painful at night.  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 Works out 5 days/week doing weight lifting - has modified this recently due to shoulder pain (normally would do bench press, incline press, lat pull-downs, rows, front and lateral raises etc. Has stopped pull-ups due to pain and used lighter weights for other ex's. Also has R elbow pain with numbness in lateral fingers - cubital tunnel syndrome which he will have surgery for. States that he is waiting for return call from doctor regarding possibly having shoulder surgery at the same time if he is going to need it at later time anyway. Had cortisone shot 2 weeks ago with no relief of pain and possibly worsening of symptoms. 5/31/17: Patient had surgery 5/19 - has been in sling since then. Took pain meds a few days but no longer taking anything. Pain moderate - mostly bothersome at night. Still has some numbness in lateral 2 fingers. Past Medical History/Comorbidities:   Mr. Hernandez Chandler  has a past medical history of Arthritis; GERD (gastroesophageal reflux disease); HTN (hypertension); and Nausea & vomiting. He also has no past medical history of Difficult intubation; Malignant hyperthermia due to anesthesia; or Pseudocholinesterase deficiency. Mr. Hernandez Chandler  has a past surgical history that includes wisdom teeth extraction and other surgical.  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 (Patient taking differently: Take 20 mg by mouth daily.  Indications: hypercholesterolemia), Disp: 90 Tab, Rfl: 3    varenicline (CHANTIX) 1 mg tablet, Take 1 Tab by mouth two (2) times a day., Disp: 60 Tab, Rfl: 5    lisinopril-hydroCHLOROthiazide (PRINZIDE, ZESTORETIC) 20-25 mg per tablet, Take 1 Tab by mouth daily. , Disp: 90 Tab, Rfl: 3    amLODIPine (NORVASC) 5 mg tablet, Take 1 Tab by mouth daily. (Patient taking differently: Take 5 mg by mouth daily. Indications: hypertension), Disp: 90 Tab, Rfl: 3   Date Last Reviewed:  6/2/2017     Number of Personal Factors/Comorbidities that affect the Plan of Care: 1-2: MODERATE COMPLEXITY   EXAMINATION:   Observation/Orthostatic Postural Assessment: Very muscular build with moderately rounded shoulders. R UE immobilised in sling with abduction pillow. Bandaging around elbow, forearm. Mild residual swelling. Palpation:    Moderate generalised tenderness s/ surgery  ROM:       LUE Assessment  LUE Assessment (WDL): Exceptions to WDL  LUE AROM  L Shoulder Flexion: 160  L Shoulder Extension: 35  L Shoulder Internal Rotation: 50  L Shoulder External Rotation: 95              RUE Assessment  RUE Assessment (WDL): Exception to WDL  RUE PROM  R Shoulder Flexion: 40   R shoulder abduction 45  R Shoulder Internal Rotation: 25  R Shoulder External Rotation: 20   R elbow ext - 5  Mod to marked pain all movts  Not fully tested due to surgery      Strength:    LUE Strength, Tone, Sensation  LUE Strength, Tone, Sensation (WDL): Within defined limits        RUE Strength, Tone, Sensation  RUE Strength, Tone, Sensation (WDL): Exception to WDL  RUE Strength nt due to recent surgery             Special Tests:   Neurological Screen:  WDL  (except for mild numbness 4th and 5th fingers due to cubital tunnel syndrome)  Functional Mobility:      limited with all UE ADL's  Balance: WDL   Body Structures Involved:  1. Nerves  2. Joints  3. Muscles  4. Ligaments Body Functions Affected:  1. Sensory/Pain  2. Neuromusculoskeletal  3. Movement Related Activities and Participation Affected:  1. General Tasks and Demands  2. Mobility  3.  Self Care   Number of elements (examined above) that affect the Plan of Care: 3: MODERATE COMPLEXITY   CLINICAL PRESENTATION:   Presentation: Evolving clinical presentation with changing clinical characteristics: MODERATE COMPLEXITY   CLINICAL DECISION MAKING:   Outcome Measure: Tool Used: Disabilities of the Arm, Shoulder and Hand (DASH) Questionnaire - Quick Version  Score:  Initial: 20/55  Most Recent: 41/55 (Date: 5/31/17 )   Interpretation of Score: The DASH is designed to measure the activities of daily living in person's with upper extremity dysfunction or pain. Each section is scored on a 1-5 scale, 5 representing the greatest disability. The scores of each section are added together for a total score of 55. Score 11 12-19 20-28 29-37 38-45 46-54 55   Modifier CH CI CJ CK CL CM CN   ** decreased score due to recent surgery    Medical Necessity:   · Patient is expected to demonstrate progress in strength, range of motion and functional technique to increase independence with all UE ADL's. · Skilled intervention continues to be required due to pain, decreased ROM & strength limiting UE ADL's. Reason for Services/Other Comments:  · Patient continues to continues to require modification of therapeutic interventions to increase complexity of exercises. · Patient continues to require skilled intervention due to pain, decreased ROM, and strength limiting UE ADL's. Use of outcome tool(s) and clinical judgement create a POC that gives a: Questionable prediction of patient's progress: MODERATE COMPLEXITY            TREATMENT:   (In addition to Assessment/Re-Assessment sessions the following treatments were rendered)  Pre-treatment Symptoms/Complaints: Patient reports shoulder moderately uncomfortable today. Tender around [facility] jt area   Pain: Initial:   Pain Intensity 1: 7  Post Session:  7     Therapeutic Exercise: ( ):  Exercises as outlined below to improve mobility and strength. Required moderate verbal and tactile cues to promote proper body posture and promote proper body mechanics.   Progressed resistance, range and repetitions as indicated. Reviewed pendulum exercises, self-assisted PROM for flexion, abd, ER/IR  Manual Therapy (45 min): Gentle PROM for sh flexion, abduction, ER/IR in scapular plane(45 deg abd) ~ ltd to 30 deg, Isometrics flex, ext, abd, IR/ER. Rhythmic stab IR/ER. Passive and act assisted elbow ROM, wrist/hand AROM & ball squeeze. Stretching as edd to regain full elbow ROM, STM to biceps area and gentle scar tissue mob post elbow    Therapeutic Modalities: encouraged to use ice at home, particularly after exercise    Treatment/Session Assessment:    · Response to Treatment:  Patient tolerated treatment with moderate to marked difficulty due to pain. Was able to tolerate flexion ROM much better today however and able to get to close to 90 deg. Incision healing well posterior elbow. Moderate muscle spasm with elbow ext and with isometrics. Will gradually work on restoration of ROM and gentle strengthening to restore functional use of R UE.  · Compliance with Program/Exercises: Compliant with HEP. · Recommendations/Intent for next treatment session: \"Next visit will focus on gradual restoration of PROM with progression to [facility] and gentle strengthening over next several weeks within guidelines of rotator cuff rehabilitation protocol. FREQUENCY/DURATION: Follow patient 3 times a week for 12 weeks to address above goals, and upon reassessment will adjust frequency and duration as progress indicates.   Total Treatment Duration:  PT Patient Time In/Time Out  Time In: 1215  Time Out: 1300  No. Of Visits: 2/7  Elvia Or, PT,

## 2017-06-05 ENCOUNTER — HOSPITAL ENCOUNTER (OUTPATIENT)
Dept: PHYSICAL THERAPY | Age: 37
Discharge: HOME | End: 2017-06-05
Payer: COMMERCIAL

## 2017-06-05 PROCEDURE — 97140 MANUAL THERAPY 1/> REGIONS: CPT

## 2017-06-05 NOTE — PROGRESS NOTES
Gerhardt Beth  : 1980 [facility] at [facility] Therapy  [address], [facility], [address]  [phone]   [phone]         OUTPATIENT PHYSICAL THERAPY:Daily Note 2017    ICD-10: Treatment Diagnosis: Pain in right shoulder (M25.511)  Incomplete rotator cuff teaImpingement syndrome of right shoulder (M75.41)r or rupture of right shoulder, not specified as traumatic (M75.111)    Precautions/Allergies:   Penicillins and Sulfa (sulfonamide antibiotics)  Fall Risk Score: 1 (? 5 = High Risk)  MD Orders: Evaluate and treat MEDICAL/REFERRING DIAGNOSIS:  Impingement syndrome of right shoulder [M75.41]  Incomplete rotator cuff tear or rupture of right shoulder, not specified as traumatic [M75.111]   DATE OF ONSET: chronic  REFERRING PHYSICIAN: Ellie Waller MD  RETURN PHYSICIAN APPOINTMENT: 17     ASSESSMENT:  Mr. Sharan Sánchez initially presented with chronic R shoulder pain with overhead and lifting ADL's with recent worsening of symptoms. His workout regimen of weight lifting was likely contributing to symptoms. . He had decreased mobility with shoulder extension and IR along with decreased strength rotator cuff/scapular musculature and associated pain. He was seen for 4 visits prior to undergoing surgery on 17 to repair rotator cuff, labral tear, along with subacromial decompression. Additionally he had ulnar nerve release on same arm for cubital tunnel syndrome. He is now returning for physical therapy following surgery to gradually restore ROM with later progression to strengthening and restoration of functional use of R UE. PROBLEM LIST (Impacting functional limitations):  1. Decreased Strength  2. Decreased ADL/Functional Activities  3. Increased Pain  4. Decreased Flexibility/Joint Mobility  5. Decreased [place] with Home Exercise Program INTERVENTIONS PLANNED:  1. Home Exercise Program (HEP)  2. Manual Therapy  3.  Range of Motion (ROM)  4. Therapeutic Exercise/Strengthening  5. modalities/taping as indicated   TREATMENT PLAN:  Effective Dates: 5/31/17 TO 8/31/17. Frequency/Duration: 2 times a week for 12 weeks  GOALS: (Goals have been discussed and agreed upon with patient.)  - goals revised due to recent surgery  Short-Term Functional Goals: Time Frame: 2-4 weeks  1. Increase shoulder flexion PROM to >140 deg and ER >45 deg to allow full AROM shoulder  2. Decrease pain level by 2/10 for reaching and lifting ADL's  3. [place] with HEP with minimal cueing  4. Patient to be aware of post-op precautions with posture, positioning, and movt to protect repair. DISCHARGE GOALS: Time Frame: 12 weeks  1. Restore full ROM R shoulder for performance of all UE ADL's  2. Patient to report minimal levels of pain with normal ADL's  3. Increase strength to at least 4/5 in all muscle groups for return to recreational activities  4. Improve score on DASH by > 8 points for ease with dressing and lifting ADL's  5. [place] with advanced HEP with no cueing  Rehabilitation Potential For Stated Goals: GOOD  Regarding Laneta Essex Sr.'s therapy, I certify that the treatment plan above will be carried out by a therapist or under their direction. Thank you for this referral,  Mk Doan PT     Referring Physician Signature: Shona Newell MD              Date                    The information in this section was collected on 4/17/17 (except where otherwise noted). HISTORY:   History of Present Injury/Illness (Reason for Referral):  Patient reports pain in R shoulder on and off for past couple of years, with recent worsening of symptoms in past couple of months. Also reports intermittent popping/clicking with certain movts. Pain mostly present with reaching overhead, lifting/carrying heavy objects. Sometimes it will ache for part of the day. Sometimes painful at night.  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 Works out 5 days/week doing weight lifting - has modified this recently due to shoulder pain (normally would do bench press, incline press, lat pull-downs, rows, front and lateral raises etc. Has stopped pull-ups due to pain and used lighter weights for other ex's. Also has R elbow pain with numbness in lateral fingers - cubital tunnel syndrome which he will have surgery for. States that he is waiting for return call from doctor regarding possibly having shoulder surgery at the same time if he is going to need it at later time anyway. Had cortisone shot 2 weeks ago with no relief of pain and possibly worsening of symptoms. 5/31/17: Patient had surgery 5/19 - has been in sling since then. Took pain meds a few days but no longer taking anything. Pain moderate - mostly bothersome at night. Still has some numbness in lateral 2 fingers. Past Medical History/Comorbidities:   Mr. Hernandez Chandler  has a past medical history of Arthritis; GERD (gastroesophageal reflux disease); HTN (hypertension); and Nausea & vomiting. He also has no past medical history of Difficult intubation; Malignant hyperthermia due to anesthesia; or Pseudocholinesterase deficiency. Mr. Hernandez Chandler  has a past surgical history that includes wisdom teeth extraction and other surgical.  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 (Patient taking differently: Take 20 mg by mouth daily.  Indications: hypercholesterolemia), Disp: 90 Tab, Rfl: 3    varenicline (CHANTIX) 1 mg tablet, Take 1 Tab by mouth two (2) times a day., Disp: 60 Tab, Rfl: 5    lisinopril-hydroCHLOROthiazide (PRINZIDE, ZESTORETIC) 20-25 mg per tablet, Take 1 Tab by mouth daily. , Disp: 90 Tab, Rfl: 3    amLODIPine (NORVASC) 5 mg tablet, Take 1 Tab by mouth daily. (Patient taking differently: Take 5 mg by mouth daily. Indications: hypertension), Disp: 90 Tab, Rfl: 3   Date Last Reviewed:  6/5/2017     Number of Personal Factors/Comorbidities that affect the Plan of Care: 1-2: MODERATE COMPLEXITY   EXAMINATION:   Observation/Orthostatic Postural Assessment: Very muscular build with moderately rounded shoulders. R UE immobilised in sling with abduction pillow. Bandaging around elbow, forearm. Mild residual swelling. Palpation:    Moderate generalised tenderness s/ surgery  ROM:       LUE Assessment  LUE Assessment (WDL): Exceptions to WDL  LUE AROM  L Shoulder Flexion: 160  L Shoulder Extension: 35  L Shoulder Internal Rotation: 50  L Shoulder External Rotation: 95              RUE Assessment  RUE Assessment (WDL): Exception to WDL  RUE PROM  R Shoulder Flexion: 40   R shoulder abduction 45  R Shoulder Internal Rotation: 25  R Shoulder External Rotation: 20   R elbow ext - 5  Mod to marked pain all movts  Not fully tested due to surgery      Strength:    LUE Strength, Tone, Sensation  LUE Strength, Tone, Sensation (WDL): Within defined limits        RUE Strength, Tone, Sensation  RUE Strength, Tone, Sensation (WDL): Exception to WDL  RUE Strength nt due to recent surgery             Special Tests:   Neurological Screen:  WDL  (except for mild numbness 4th and 5th fingers due to cubital tunnel syndrome)  Functional Mobility:      limited with all UE ADL's  Balance: WDL   Body Structures Involved:  1. Nerves  2. Joints  3. Muscles  4. Ligaments Body Functions Affected:  1. Sensory/Pain  2. Neuromusculoskeletal  3. Movement Related Activities and Participation Affected:  1. General Tasks and Demands  2. Mobility  3.  Self Care   Number of elements (examined above) that affect the Plan of Care: 3: MODERATE COMPLEXITY   CLINICAL PRESENTATION:   Presentation: Evolving clinical presentation with changing clinical characteristics: MODERATE COMPLEXITY   CLINICAL DECISION MAKING:   Outcome Measure: Tool Used: Disabilities of the Arm, Shoulder and Hand (DASH) Questionnaire - Quick Version  Score:  Initial: 20/55  Most Recent: 41/55 (Date: 5/31/17 )   Interpretation of Score: The DASH is designed to measure the activities of daily living in person's with upper extremity dysfunction or pain. Each section is scored on a 1-5 scale, 5 representing the greatest disability. The scores of each section are added together for a total score of 55. Score 11 12-19 20-28 29-37 38-45 46-54 55   Modifier CH CI CJ CK CL CM CN   ** decreased score due to recent surgery    Medical Necessity:   · Patient is expected to demonstrate progress in strength, range of motion and functional technique to increase independence with all UE ADL's. · Skilled intervention continues to be required due to pain, decreased ROM & strength limiting UE ADL's. Reason for Services/Other Comments:  · Patient continues to continues to require modification of therapeutic interventions to increase complexity of exercises. · Patient continues to require skilled intervention due to pain, decreased ROM, and strength limiting UE ADL's. Use of outcome tool(s) and clinical judgement create a POC that gives a: Questionable prediction of patient's progress: MODERATE COMPLEXITY            TREATMENT:   (In addition to Assessment/Re-Assessment sessions the following treatments were rendered)  Pre-treatment Symptoms/Complaints: Patient reports he saw doctor this morning and no longer has to use abduction pillow. Can discontinue use of sling in 1 more week. Shoulder pain seems to be decreasing. Pain: Initial:   Pain Intensity 1: 6  Post Session:  6     Therapeutic Exercise: (5 ):  Exercises as outlined below to improve mobility and strength.   Required moderate verbal and tactile cues to promote proper body posture and promote proper body mechanics. Progressed resistance, range and repetitions as indicated. Reviewed pendulum exercises, self-assisted PROM for flexion, abd, ER/IR. Isometrics using opp hand or wall. Reinforced postural correction. Manual Therapy (45 min): Gentle PROM for sh flexion, abduction, ER/IR in scapular plane(45 deg abd), Isometrics flex, ext, abd, IR/ER. Rhythmic stab IR/ER. Passive and act assisted elbow ROM, Gentle resisted elbow flex/ext. wrist/hand AROM & ball squeeze. Active scap ex as toleStretching as edd to regain full elbow ROM, STM to biceps area and gentle scar tissue mob post elbow    Therapeutic Modalities: ice at home, particularly after exercise    Treatment/Session Assessment:    · Response to Treatment:  Patient tolerated treatment much better today. Was able to tolerate flexion ROM with minimal pain with ROM to at least 100 deg. Gradual improvement in IR/ER ROM with no muscle spasm. Incision healing well posterior elbow. Moderate muscle spasm with elbow ext; less apparent with isometrics. Will gradually work on restoration of ROM and gentle strengthening to restore functional use of R UE.   · Compliance with Program/Exercises: Compliant with HEP. · Recommendations/Intent for next treatment session: \"Next visit will focus on gradual restoration of PROM with progression to [facility] and gentle strengthening over next several weeks within guidelines of rotator cuff rehabilitation protocol. FREQUENCY/DURATION: Follow patient 3 times a week for 12 weeks to address above goals, and upon reassessment will adjust frequency and duration as progress indicates.   Total Treatment Duration:  PT Patient Time In/Time Out  Time In: 1140  Time Out: 1230  No. Of Visits: 3/8  Steve Romero PT,

## 2017-06-09 ENCOUNTER — HOSPITAL ENCOUNTER (OUTPATIENT)
Dept: PHYSICAL THERAPY | Age: 37
Discharge: HOME | End: 2017-06-09
Payer: COMMERCIAL

## 2017-06-09 PROCEDURE — 97140 MANUAL THERAPY 1/> REGIONS: CPT

## 2017-06-09 NOTE — PROGRESS NOTES
Mannie Mckinley  : 1980 [facility] [place] [address] Therapy  [address], [address]  [phone]   [phone]         OUTPATIENT PHYSICAL THERAPY:Daily Note 2017    ICD-10: Treatment Diagnosis: Pain in right shoulder (M25.511)  Incomplete rotator cuff teaImpingement syndrome of right shoulder (M75.41)r or rupture of right shoulder, not specified as traumatic (M75.111)    Precautions/Allergies:   Penicillins and Sulfa (sulfonamide antibiotics)  Fall Risk Score: 1 (? 5 = High Risk)  MD Orders: Evaluate and treat MEDICAL/REFERRING DIAGNOSIS:  Impingement syndrome of right shoulder [M75.41]  Incomplete rotator cuff tear or rupture of right shoulder, not specified as traumatic [M75.111]   DATE OF ONSET: chronic  REFERRING PHYSICIAN: Shona Newell MD  RETURN PHYSICIAN APPOINTMENT: 17     ASSESSMENT:  Mr. Estella Khan initially presented with chronic R shoulder pain with overhead and lifting ADL's with recent worsening of symptoms. His workout regimen of weight lifting was likely contributing to symptoms. . He had decreased mobility with shoulder extension and IR along with decreased strength rotator cuff/scapular musculature and associated pain. He was seen for 4 visits prior to undergoing surgery on 17 to repair rotator cuff, labral tear, along with subacromial decompression. Additionally he had ulnar nerve release on same arm for cubital tunnel syndrome. He is now returning for physical therapy following surgery to gradually restore ROM with later progression to strengthening and restoration of functional use of R UE. PROBLEM LIST (Impacting functional limitations):  1. Decreased Strength  2. Decreased ADL/Functional Activities  3. Increased Pain  4. Decreased Flexibility/Joint Mobility  5. Decreased [place] with Home Exercise Program INTERVENTIONS PLANNED:  1. Home Exercise Program (HEP)  2. Manual Therapy  3.  Range of Motion (ROM)  4. Therapeutic Exercise/Strengthening  5. modalities/taping as indicated   TREATMENT PLAN:  Effective Dates: 5/31/17 TO 8/31/17. Frequency/Duration: 2 times a week for 12 weeks  GOALS: (Goals have been discussed and agreed upon with patient.)  - goals revised due to recent surgery  Short-Term Functional Goals: Time Frame: 2-4 weeks  1. Increase shoulder flexion PROM to >140 deg and ER >45 deg to allow full AROM shoulder  2. Decrease pain level by 2/10 for reaching and lifting ADL's  3. [place] with HEP with minimal cueing  4. Patient to be aware of post-op precautions with posture, positioning, and movt to protect repair. DISCHARGE GOALS: Time Frame: 12 weeks  1. Restore full ROM R shoulder for performance of all UE ADL's  2. Patient to report minimal levels of pain with normal ADL's  3. Increase strength to at least 4/5 in all muscle groups for return to recreational activities  4. Improve score on DASH by > 8 points for ease with dressing and lifting ADL's  5. [place] with advanced HEP with no cueing  Rehabilitation Potential For Stated Goals: GOOD  Regarding Almas Del Valle Sr.'s therapy, I certify that the treatment plan above will be carried out by a therapist or under their direction. Thank you for this referral,  Denise Francisco PT     Referring Physician Signature: Ken Carpio MD              Date                    The information in this section was collected on 4/17/17 (except where otherwise noted). HISTORY:   History of Present Injury/Illness (Reason for Referral):  Patient reports pain in R shoulder on and off for past couple of years, with recent worsening of symptoms in past couple of months. Also reports intermittent popping/clicking with certain movts. Pain mostly present with reaching overhead, lifting/carrying heavy objects. Sometimes it will ache for part of the day. Sometimes painful at night.  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 Works out 5 days/week doing weight lifting - has modified this recently due to shoulder pain (normally would do bench press, incline press, lat pull-downs, rows, front and lateral raises etc. Has stopped pull-ups due to pain and used lighter weights for other ex's. Also has R elbow pain with numbness in lateral fingers - cubital tunnel syndrome which he will have surgery for. States that he is waiting for return call from doctor regarding possibly having shoulder surgery at the same time if he is going to need it at later time anyway. Had cortisone shot 2 weeks ago with no relief of pain and possibly worsening of symptoms. 5/31/17: Patient had surgery 5/19 - has been in sling since then. Took pain meds a few days but no longer taking anything. Pain moderate - mostly bothersome at night. Still has some numbness in lateral 2 fingers. Past Medical History/Comorbidities:   Mr. Jasen Chaney  has a past medical history of Arthritis; GERD (gastroesophageal reflux disease); HTN (hypertension); and Nausea & vomiting. He also has no past medical history of Difficult intubation; Malignant hyperthermia due to anesthesia; or Pseudocholinesterase deficiency. Mr. Jasen Chaney  has a past surgical history that includes wisdom teeth extraction and other surgical.  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 (Patient taking differently: Take 20 mg by mouth daily.  Indications: hypercholesterolemia), Disp: 90 Tab, Rfl: 3    varenicline (CHANTIX) 1 mg tablet, Take 1 Tab by mouth two (2) times a day., Disp: 60 Tab, Rfl: 5    lisinopril-hydroCHLOROthiazide (PRINZIDE, ZESTORETIC) 20-25 mg per tablet, Take 1 Tab by mouth daily. , Disp: 90 Tab, Rfl: 3    amLODIPine (NORVASC) 5 mg tablet, Take 1 Tab by mouth daily. (Patient taking differently: Take 5 mg by mouth daily. Indications: hypertension), Disp: 90 Tab, Rfl: 3   Date Last Reviewed:  6/9/2017     Number of Personal Factors/Comorbidities that affect the Plan of Care: 1-2: MODERATE COMPLEXITY   EXAMINATION:   Observation/Orthostatic Postural Assessment: Very muscular build with moderately rounded shoulders. R UE immobilised in sling with abduction pillow. Bandaging around elbow, forearm. Mild residual swelling. Palpation:    Moderate generalised tenderness s/ surgery  ROM:       LUE Assessment  LUE Assessment (WDL): Exceptions to WDL  LUE AROM  L Shoulder Flexion: 160  L Shoulder Extension: 35  L Shoulder Internal Rotation: 50  L Shoulder External Rotation: 95              RUE Assessment  RUE Assessment (WDL): Exception to WDL  RUE PROM  R Shoulder Flexion: 40   R shoulder abduction 45  R Shoulder Internal Rotation: 25  R Shoulder External Rotation: 20   R elbow ext - 5  Mod to marked pain all movts  Not fully tested due to surgery      Strength:    LUE Strength, Tone, Sensation  LUE Strength, Tone, Sensation (WDL): Within defined limits        RUE Strength, Tone, Sensation  RUE Strength, Tone, Sensation (WDL): Exception to WDL  RUE Strength nt due to recent surgery             Special Tests:   Neurological Screen:  WDL  (except for mild numbness 4th and 5th fingers due to cubital tunnel syndrome)  Functional Mobility:      limited with all UE ADL's  Balance: WDL   Body Structures Involved:  1. Nerves  2. Joints  3. Muscles  4. Ligaments Body Functions Affected:  1. Sensory/Pain  2. Neuromusculoskeletal  3. Movement Related Activities and Participation Affected:  1. General Tasks and Demands  2. Mobility  3.  Self Care   Number of elements (examined above) that affect the Plan of Care: 3: MODERATE COMPLEXITY   CLINICAL PRESENTATION:   Presentation: Evolving clinical presentation with changing clinical characteristics: MODERATE COMPLEXITY   CLINICAL DECISION MAKING:   Outcome Measure: Tool Used: Disabilities of the Arm, Shoulder and Hand (DASH) Questionnaire - Quick Version  Score:  Initial: 20/55  Most Recent: 41/55 (Date: 5/31/17 )   Interpretation of Score: The DASH is designed to measure the activities of daily living in person's with upper extremity dysfunction or pain. Each section is scored on a 1-5 scale, 5 representing the greatest disability. The scores of each section are added together for a total score of 55. Score 11 12-19 20-28 29-37 38-45 46-54 55   Modifier CH CI CJ CK CL CM CN   ** decreased score due to recent surgery    Medical Necessity:   · Patient is expected to demonstrate progress in strength, range of motion and functional technique to increase independence with all UE ADL's. · Skilled intervention continues to be required due to pain, decreased ROM & strength limiting UE ADL's. Reason for Services/Other Comments:  · Patient continues to continues to require modification of therapeutic interventions to increase complexity of exercises. · Patient continues to require skilled intervention due to pain, decreased ROM, and strength limiting UE ADL's. Use of outcome tool(s) and clinical judgement create a POC that gives a: Questionable prediction of patient's progress: MODERATE COMPLEXITY            TREATMENT:   (In addition to Assessment/Re-Assessment sessions the following treatments were rendered)  Pre-treatment Symptoms/Complaints: Patient states he has been taking his sling off some of the day when just at desk. Pain still present but gradually improving. Pain: Initial:   Pain Intensity 1: 6  Post Session:  6     Therapeutic Exercise: (5 ):  Exercises as outlined below to improve mobility and strength. Required moderate verbal and tactile cues to promote proper body posture and promote proper body mechanics.   Progressed resistance, range and repetitions as indicated. Reviewed pendulum exercises, self-assisted PROM for flexion, abd, ER/IR. Isometrics using opp hand or wall. Reinforced postural correction. Manual Therapy (45 min): Gentle PROM for sh flexion, abduction, ER/IR in scapular plane(45 deg abd), Isometrics flex, ext, abd, IR/ER. Rhythmic stab IR/ER. Passive and act assisted elbow ROM, Gentle resisted elbow flex/ext. wrist/hand AROM & ball squeeze. Active scap ex as edd. Stretching as edd to regain full elbow ROM, STM to biceps area and gentle scar tissue mob post elbow    Therapeutic Modalities: ice at home, particularly after exercise    Treatment/Session Assessment:    · Response to Treatment:  Patient tolerated treatment fairly well. Was able to tolerate flexion ROM with minimal pain with ROM to ~ 110 deg. Gradual improvement in IR/ER ROM with no muscle spasm - 50 deg each. Incision healing well posterior elbow - encouraged to do self massage around scar. Moderate muscle spasm with elbow ext; less apparent with isometrics. Will gradually work on restoration of ROM and gentle strengthening to restore functional use of R UE. Progress next week once out of sling. · Compliance with Program/Exercises: Compliant with HEP. · Recommendations/Intent for next treatment session: \"Next visit will focus on gradual restoration of PROM with progression to [facility] and gentle strengthening over next several weeks within guidelines of rotator cuff rehabilitation protocol. FREQUENCY/DURATION: Follow patient 3 times a week for 12 weeks to address above goals, and upon reassessment will adjust frequency and duration as progress indicates.   Total Treatment Duration:  PT Patient Time In/Time Out  Time In: 1215  Time Out: 1305  No. Of Visits: 4/9  Federica Prather, PT,

## 2017-06-12 ENCOUNTER — HOSPITAL ENCOUNTER (OUTPATIENT)
Dept: PHYSICAL THERAPY | Age: 37
Discharge: HOME | End: 2017-06-12
Payer: COMMERCIAL

## 2017-06-12 PROCEDURE — 97110 THERAPEUTIC EXERCISES: CPT

## 2017-06-12 PROCEDURE — 97140 MANUAL THERAPY 1/> REGIONS: CPT

## 2017-06-12 NOTE — PROGRESS NOTES
Jaya Hoskins  : 1980 [facility] Therapy  [address], [facility], [address]  [phone]   [phone]         OUTPATIENT PHYSICAL THERAPY:Daily Note 2017    ICD-10: Treatment Diagnosis: Pain in right shoulder (M25.511)  Incomplete rotator cuff teaImpingement syndrome of right shoulder (M75.41)r or rupture of right shoulder, not specified as traumatic (M75.111)    Precautions/Allergies:   Penicillins and Sulfa (sulfonamide antibiotics)  Fall Risk Score: 1 (? 5 = High Risk)  MD Orders: Evaluate and treat MEDICAL/REFERRING DIAGNOSIS:  Impingement syndrome of right shoulder [M75.41]  Incomplete rotator cuff tear or rupture of right shoulder, not specified as traumatic [M75.111]   DATE OF ONSET: chronic  REFERRING PHYSICIAN: Colletta Pelton, MD  RETURN PHYSICIAN APPOINTMENT: 17     ASSESSMENT:  Mr. Rodrigo Clark initially presented with chronic R shoulder pain with overhead and lifting ADL's with recent worsening of symptoms. His workout regimen of weight lifting was likely contributing to symptoms. . He had decreased mobility with shoulder extension and IR along with decreased strength rotator cuff/scapular musculature and associated pain. He was seen for 4 visits prior to undergoing surgery on 17 to repair rotator cuff, labral tear, along with subacromial decompression. Additionally he had ulnar nerve release on same arm for cubital tunnel syndrome. He is now returning for physical therapy following surgery to gradually restore ROM with later progression to strengthening and restoration of functional use of R UE. PROBLEM LIST (Impacting functional limitations):  1. Decreased Strength  2. Decreased ADL/Functional Activities  3. Increased Pain  4. Decreased Flexibility/Joint Mobility  5. Decreased [place] with Home Exercise Program INTERVENTIONS PLANNED:  1. Home Exercise Program (HEP)  2. Manual Therapy  3.  Range of Motion (ROM)  4. Therapeutic Exercise/Strengthening  5. modalities/taping as indicated   TREATMENT PLAN:  Effective Dates: 5/31/17 TO 8/31/17. Frequency/Duration: 2 times a week for 12 weeks  GOALS: (Goals have been discussed and agreed upon with patient.)  - goals revised due to recent surgery  Short-Term Functional Goals: Time Frame: 2-4 weeks  1. Increase shoulder flexion PROM to >140 deg and ER >45 deg to allow full AROM shoulder  2. Decrease pain level by 2/10 for reaching and lifting ADL's  3. [place] with HEP with minimal cueing  4. Patient to be aware of post-op precautions with posture, positioning, and movt to protect repair. DISCHARGE GOALS: Time Frame: 12 weeks  1. Restore full ROM R shoulder for performance of all UE ADL's  2. Patient to report minimal levels of pain with normal ADL's  3. Increase strength to at least 4/5 in all muscle groups for return to recreational activities  4. Improve score on DASH by > 8 points for ease with dressing and lifting ADL's  5. [place] with advanced HEP with no cueing  Rehabilitation Potential For Stated Goals: GOOD  Regarding Nacho Chahal Sr.'s therapy, I certify that the treatment plan above will be carried out by a therapist or under their direction. Thank you for this referral,  Marivel Aguilera PT     Referring Physician Signature: Nadege Govea MD              Date                    The information in this section was collected on 4/17/17 (except where otherwise noted). HISTORY:   History of Present Injury/Illness (Reason for Referral):  Patient reports pain in R shoulder on and off for past couple of years, with recent worsening of symptoms in past couple of months. Also reports intermittent popping/clicking with certain movts. Pain mostly present with reaching overhead, lifting/carrying heavy objects. Sometimes it will ache for part of the day. Sometimes painful at night.  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 Works out 5 days/week doing weight lifting - has modified this recently due to shoulder pain (normally would do bench press, incline press, lat pull-downs, rows, front and lateral raises etc. Has stopped pull-ups due to pain and used lighter weights for other ex's. Also has R elbow pain with numbness in lateral fingers - cubital tunnel syndrome which he will have surgery for. States that he is waiting for return call from doctor regarding possibly having shoulder surgery at the same time if he is going to need it at later time anyway. Had cortisone shot 2 weeks ago with no relief of pain and possibly worsening of symptoms. 5/31/17: Patient had surgery 5/19 - has been in sling since then. Took pain meds a few days but no longer taking anything. Pain moderate - mostly bothersome at night. Still has some numbness in lateral 2 fingers. Past Medical History/Comorbidities:   Mr. Scott Alvarenga  has a past medical history of Arthritis; GERD (gastroesophageal reflux disease); HTN (hypertension); and Nausea & vomiting. He also has no past medical history of Difficult intubation; Malignant hyperthermia due to anesthesia; or Pseudocholinesterase deficiency. Mr. Scott Alvarenga  has a past surgical history that includes wisdom teeth extraction and other surgical.  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 (Patient taking differently: Take 20 mg by mouth daily.  Indications: hypercholesterolemia), Disp: 90 Tab, Rfl: 3    varenicline (CHANTIX) 1 mg tablet, Take 1 Tab by mouth two (2) times a day., Disp: 60 Tab, Rfl: 5    lisinopril-hydroCHLOROthiazide (PRINZIDE, ZESTORETIC) 20-25 mg per tablet, Take 1 Tab by mouth daily. , Disp: 90 Tab, Rfl: 3    amLODIPine (NORVASC) 5 mg tablet, Take 1 Tab by mouth daily. (Patient taking differently: Take 5 mg by mouth daily. Indications: hypertension), Disp: 90 Tab, Rfl: 3   Date Last Reviewed:  6/12/2017     Number of Personal Factors/Comorbidities that affect the Plan of Care: 1-2: MODERATE COMPLEXITY   EXAMINATION:   Observation/Orthostatic Postural Assessment: Very muscular build with moderately rounded shoulders. R UE immobilised in sling with abduction pillow. Bandaging around elbow, forearm. Mild residual swelling. Palpation:    Moderate generalised tenderness s/ surgery  ROM:       LUE Assessment  LUE Assessment (WDL): Exceptions to WDL  LUE AROM  L Shoulder Flexion: 160  L Shoulder Extension: 35  L Shoulder Internal Rotation: 50  L Shoulder External Rotation: 95              RUE Assessment  RUE Assessment (WDL): Exception to WDL  RUE PROM  R Shoulder Flexion: 40   R shoulder abduction 45  R Shoulder Internal Rotation: 25  R Shoulder External Rotation: 20   R elbow ext - 5  Mod to marked pain all movts  Not fully tested due to surgery      Strength:    LUE Strength, Tone, Sensation  LUE Strength, Tone, Sensation (WDL): Within defined limits        RUE Strength, Tone, Sensation  RUE Strength, Tone, Sensation (WDL): Exception to WDL  RUE Strength nt due to recent surgery             Special Tests:   Neurological Screen:  WDL  (except for mild numbness 4th and 5th fingers due to cubital tunnel syndrome)  Functional Mobility:      limited with all UE ADL's  Balance: WDL   Body Structures Involved:  1. Nerves  2. Joints  3. Muscles  4. Ligaments Body Functions Affected:  1. Sensory/Pain  2. Neuromusculoskeletal  3. Movement Related Activities and Participation Affected:  1. General Tasks and Demands  2. Mobility  3.  Self Care   Number of elements (examined above) that affect the Plan of Care: 3: MODERATE COMPLEXITY   CLINICAL PRESENTATION:   Presentation: Evolving clinical presentation with changing clinical characteristics: MODERATE COMPLEXITY   CLINICAL DECISION MAKING:   Outcome Measure: Tool Used: Disabilities of the Arm, Shoulder and Hand (DASH) Questionnaire - Quick Version  Score:  Initial: 20/55  Most Recent: 41/55 (Date: 5/31/17 )   Interpretation of Score: The DASH is designed to measure the activities of daily living in person's with upper extremity dysfunction or pain. Each section is scored on a 1-5 scale, 5 representing the greatest disability. The scores of each section are added together for a total score of 55. Score 11 12-19 20-28 29-37 38-45 46-54 55   Modifier CH CI CJ CK CL CM CN   ** decreased score due to recent surgery    Medical Necessity:   · Patient is expected to demonstrate progress in strength, range of motion and functional technique to increase independence with all UE ADL's. · Skilled intervention continues to be required due to pain, decreased ROM & strength limiting UE ADL's. Reason for Services/Other Comments:  · Patient continues to continues to require modification of therapeutic interventions to increase complexity of exercises. · Patient continues to require skilled intervention due to pain, decreased ROM, and strength limiting UE ADL's. Use of outcome tool(s) and clinical judgement create a POC that gives a: Questionable prediction of patient's progress: MODERATE COMPLEXITY            TREATMENT:   (In addition to Assessment/Re-Assessment sessions the following treatments were rendered)  Pre-treatment Symptoms/Complaints: Patient states he had good weekend. Pain not too bad. Pain: Initial:   Pain Intensity 1: 5  Post Session:  6     Therapeutic Exercise: (30 ):  Exercises as outlined below to improve mobility and strength. Required moderate verbal and tactile cues to promote proper body posture and promote proper body mechanics. Progressed resistance, range and repetitions as indicated.      Reciprocal pulleys ~ 8 mins for passive & AAROM  AAROM - sh flex with bent elbow               - sh abd/add, IR/ER @ 45 & 90 deg abd  Active & resisted elbow flexion/ext  Resisted scap ex - elev/dep, ret/prot, modified PNF  Supine sh prot, 1 x 10  Gentle resisted ER/IR at 45 deg abd  Rhythmic stab IR/ER at 45 & 90 deg abd                        Flex/ext at 90 & 110 deg  Manual Therapy (20 min): PROM/stretching for sh flexion, abduction, ER/IR. Gentle jt mobs. Isometrics flex, ext, abd, IR/ER. Passive stretching as edd to regain full elbow ROM, STM to biceps area and gentle scar tissue mob post elbow    HEP: Reviewed self-assisted PROM for flexion, abd, ER/IR. Isometrics using opp hand or wall. Reinforced postural correction. To progress to [facility] and gradually increase use of R arm but cautioned to avoid lifting or too aggressive motion. Therapeutic Modalities: ice at home, particularly after exercise    Treatment/Session Assessment:    · Response to Treatment:  Patient tolerated treatment fairly well. Able to tolerate flexion ROM with minimal pain with ROM to ~ 125 deg. Gradual improvement in IR/ER ROM with no muscle spasm - close to full ROM. Incision healing well posterior elbow - encouraged to do self massage around scar and use lotion. Mild muscle spasm with elbow ext. Will gradually work on restoration of ROM and gentle strengthening to restore functional use of R UE. Progress to light strengthening at next visit. · Compliance with Program/Exercises: Compliant with HEP. · Recommendations/Intent for next treatment session: \"Next visit will focus on gradual restoration of PROM with progression to [facility] and gentle strengthening over next several weeks within guidelines of rotator cuff rehabilitation protocol. FREQUENCY/DURATION: Follow patient 3 times a week for 12 weeks to address above goals, and upon reassessment will adjust frequency and duration as progress indicates.   Total Treatment Duration:  PT Patient Time In/Time Out  Time In: 1220  Time Out: 1310  No. Of Visits: 5/10  Haresh De Leon PT,

## 2017-06-16 ENCOUNTER — HOSPITAL ENCOUNTER (OUTPATIENT)
Dept: PHYSICAL THERAPY | Age: 37
Discharge: HOME | End: 2017-06-16
Payer: COMMERCIAL

## 2017-06-16 PROCEDURE — 97140 MANUAL THERAPY 1/> REGIONS: CPT

## 2017-06-16 PROCEDURE — 97110 THERAPEUTIC EXERCISES: CPT

## 2017-06-16 NOTE — PROGRESS NOTES
Nisha Service  : 1980 [facility]  [address], [facility], [address]  [phone]   [phone]         OUTPATIENT PHYSICAL THERAPY:Daily Note 2017    ICD-10: Treatment Diagnosis: Pain in right shoulder (M25.511)  Incomplete rotator cuff teaImpingement syndrome of right shoulder (M75.41)r or rupture of right shoulder, not specified as traumatic (M75.111)    Precautions/Allergies:   Penicillins and Sulfa (sulfonamide antibiotics)  Fall Risk Score: 1 (? 5 = High Risk)  MD Orders: Evaluate and treat MEDICAL/REFERRING DIAGNOSIS:  Impingement syndrome of right shoulder [M75.41]  Incomplete rotator cuff tear or rupture of right shoulder, not specified as traumatic [M75.111]   DATE OF ONSET: chronic  REFERRING PHYSICIAN: Lizzie Howe MD  RETURN PHYSICIAN APPOINTMENT: 17     ASSESSMENT:  Mr. Abel Lees initially presented with chronic R shoulder pain with overhead and lifting ADL's with recent worsening of symptoms. His workout regimen of weight lifting was likely contributing to symptoms. . He had decreased mobility with shoulder extension and IR along with decreased strength rotator cuff/scapular musculature and associated pain. He was seen for 4 visits prior to undergoing surgery on 17 to repair rotator cuff, labral tear, along with subacromial decompression. Additionally he had ulnar nerve release on same arm for cubital tunnel syndrome. He is now returning for physical therapy following surgery to gradually restore ROM with later progression to strengthening and restoration of functional use of R UE. PROBLEM LIST (Impacting functional limitations):  1. Decreased Strength  2. Decreased ADL/Functional Activities  3. Increased Pain  4. Decreased Flexibility/Joint Mobility  5. Decreased [place] with Home Exercise Program INTERVENTIONS PLANNED:  1. Home Exercise Program (HEP)  2. Manual Therapy  3.  Range of Motion (ROM)  4. Therapeutic Exercise/Strengthening  5. modalities/taping as indicated   TREATMENT PLAN:  Effective Dates: 5/31/17 TO 8/31/17. Frequency/Duration: 2 times a week for 12 weeks  GOALS: (Goals have been discussed and agreed upon with patient.)  - goals revised due to recent surgery  Short-Term Functional Goals: Time Frame: 2-4 weeks  1. Increase shoulder flexion PROM to >140 deg and ER >45 deg to allow full AROM shoulder  2. Decrease pain level by 2/10 for reaching and lifting ADL's  3. [place] with HEP with minimal cueing  4. Patient to be aware of post-op precautions with posture, positioning, and movt to protect repair. DISCHARGE GOALS: Time Frame: 12 weeks  1. Restore full ROM R shoulder for performance of all UE ADL's  2. Patient to report minimal levels of pain with normal ADL's  3. Increase strength to at least 4/5 in all muscle groups for return to recreational activities  4. Improve score on DASH by > 8 points for ease with dressing and lifting ADL's  5. [place] with advanced HEP with no cueing  Rehabilitation Potential For Stated Goals: GOOD  Regarding Adrian Valencia Sr.'s therapy, I certify that the treatment plan above will be carried out by a therapist or under their direction. Thank you for this referral,  Adrian Cifuentes PT     Referring Physician Signature: Deborah Lei MD              Date                    The information in this section was collected on 4/17/17 (except where otherwise noted). HISTORY:   History of Present Injury/Illness (Reason for Referral):  Patient reports pain in R shoulder on and off for past couple of years, with recent worsening of symptoms in past couple of months. Also reports intermittent popping/clicking with certain movts. Pain mostly present with reaching overhead, lifting/carrying heavy objects. Sometimes it will ache for part of the day. Sometimes painful at night.  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 Works out 5 days/week doing weight lifting - has modified this recently due to shoulder pain (normally would do bench press, incline press, lat pull-downs, rows, front and lateral raises etc. Has stopped pull-ups due to pain and used lighter weights for other ex's. Also has R elbow pain with numbness in lateral fingers - cubital tunnel syndrome which he will have surgery for. States that he is waiting for return call from doctor regarding possibly having shoulder surgery at the same time if he is going to need it at later time anyway. Had cortisone shot 2 weeks ago with no relief of pain and possibly worsening of symptoms. 5/31/17: Patient had surgery 5/19 - has been in sling since then. Took pain meds a few days but no longer taking anything. Pain moderate - mostly bothersome at night. Still has some numbness in lateral 2 fingers. Past Medical History/Comorbidities:   Mr. Luma Diaz  has a past medical history of Arthritis; GERD (gastroesophageal reflux disease); HTN (hypertension); and Nausea & vomiting. He also has no past medical history of Difficult intubation; Malignant hyperthermia due to anesthesia; or Pseudocholinesterase deficiency. Mr. Luma Diaz  has a past surgical history that includes wisdom teeth extraction and other surgical.  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 (Patient taking differently: Take 20 mg by mouth daily.  Indications: hypercholesterolemia), Disp: 90 Tab, Rfl: 3    varenicline (CHANTIX) 1 mg tablet, Take 1 Tab by mouth two (2) times a day., Disp: 60 Tab, Rfl: 5    lisinopril-hydroCHLOROthiazide (PRINZIDE, ZESTORETIC) 20-25 mg per tablet, Take 1 Tab by mouth daily. , Disp: 90 Tab, Rfl: 3    amLODIPine (NORVASC) 5 mg tablet, Take 1 Tab by mouth daily. (Patient taking differently: Take 5 mg by mouth daily. Indications: hypertension), Disp: 90 Tab, Rfl: 3   Date Last Reviewed:  6/16/2017     Number of Personal Factors/Comorbidities that affect the Plan of Care: 1-2: MODERATE COMPLEXITY   EXAMINATION:   Observation/Orthostatic Postural Assessment: Very muscular build with moderately rounded shoulders. R UE immobilised in sling with abduction pillow. Bandaging around elbow, forearm. Mild residual swelling. Palpation:    Moderate generalised tenderness s/ surgery  ROM:       LUE Assessment  LUE Assessment (WDL): Exceptions to WDL  LUE AROM  L Shoulder Flexion: 160  L Shoulder Extension: 35  L Shoulder Internal Rotation: 50  L Shoulder External Rotation: 95              RUE Assessment  RUE Assessment (WDL): Exception to WDL  RUE PROM  R Shoulder Flexion: 40   R shoulder abduction 45  R Shoulder Internal Rotation: 25  R Shoulder External Rotation: 20   R elbow ext - 5  Mod to marked pain all movts  Not fully tested due to surgery      Strength:    LUE Strength, Tone, Sensation  LUE Strength, Tone, Sensation (WDL): Within defined limits        RUE Strength, Tone, Sensation  RUE Strength, Tone, Sensation (WDL): Exception to WDL  RUE Strength nt due to recent surgery             Special Tests:   Neurological Screen:  WDL  (except for mild numbness 4th and 5th fingers due to cubital tunnel syndrome)  Functional Mobility:      limited with all UE ADL's  Balance: WDL   Body Structures Involved:  1. Nerves  2. Joints  3. Muscles  4. Ligaments Body Functions Affected:  1. Sensory/Pain  2. Neuromusculoskeletal  3. Movement Related Activities and Participation Affected:  1. General Tasks and Demands  2. Mobility  3.  Self Care   Number of elements (examined above) that affect the Plan of Care: 3: MODERATE COMPLEXITY   CLINICAL PRESENTATION:   Presentation: Evolving clinical presentation with changing clinical characteristics: MODERATE COMPLEXITY   CLINICAL DECISION MAKING:   Outcome Measure: Tool Used: Disabilities of the Arm, Shoulder and Hand (DASH) Questionnaire - Quick Version  Score:  Initial: 20/55  Most Recent: 41/55 (Date: 5/31/17 )   Interpretation of Score: The DASH is designed to measure the activities of daily living in person's with upper extremity dysfunction or pain. Each section is scored on a 1-5 scale, 5 representing the greatest disability. The scores of each section are added together for a total score of 55. Score 11 12-19 20-28 29-37 38-45 46-54 55   Modifier CH CI CJ CK CL CM CN   ** decreased score due to recent surgery    Medical Necessity:   · Patient is expected to demonstrate progress in strength, range of motion and functional technique to increase independence with all UE ADL's. · Skilled intervention continues to be required due to pain, decreased ROM & strength limiting UE ADL's. Reason for Services/Other Comments:  · Patient continues to continues to require modification of therapeutic interventions to increase complexity of exercises. · Patient continues to require skilled intervention due to pain, decreased ROM, and strength limiting UE ADL's. Use of outcome tool(s) and clinical judgement create a POC that gives a: Questionable prediction of patient's progress: MODERATE COMPLEXITY            TREATMENT:   (In addition to Assessment/Re-Assessment sessions the following treatments were rendered)  Pre-treatment Symptoms/Complaints: Patient states shoulder was a little sore this week with going without sling  Pain: Initial:   Pain Intensity 1: 5  Post Session:  5     Therapeutic Exercise: (30 ):  Exercises as outlined below to improve mobility and strength. Required moderate verbal and tactile cues to promote proper body posture and promote proper body mechanics. Progressed resistance, range and repetitions as indicated. Reciprocal pulleys ~ 8 mins for passive & AAROM  AAROM - sh flex                - sh abd/add,                  IR/ER @ 45 & 90 deg abd  Active & resisted elbow flexion/ext  Resisted scap ex - elev/dep, ret/prot, modified PNF  Supine sh prot, 1 x 10, 2#  Gentle resisted ER/IR at 45 deg abd  Rhythmic stab IR/ER at 45 & 90 deg abd                        Flex/ext at 90 & 110 deg  Sidelying ER, 2 x 10 - guided assist  Prone rows, 4#, 1 x 10  Prone ext, 2#, 1 x 10  Scap depression ex's - isometric into table, 10 reps  Sh IR, red Tband, 2 x 10  Manual Therapy (20 min): PROM/stretching for sh flexion, abduction, ER/IR. Gentle jt mobs. Isometrics flex, ext, abd, IR/ER. Passive stretching as edd to regain full elbow ROM, STM to biceps area and gentle scar tissue mob post elbow    HEP: As directed  Therapeutic Modalities: ice at home, particularly after exercise    Treatment/Session Assessment:    · Response to Treatment:  Patient tolerated treatment fairly well - mild discomfort with additional ex only. Able to tolerate flexion ROM with minimal pain with ROM to ~ 130 deg. Gradual improvement in IR/ER ROM with no muscle spasm - close to full ROM. Will gradually work on restoration of ROM and gentle strengthening to restore functional use of R UE. Continue gradual progression of light strengthening program  · Compliance with Program/Exercises: Compliant with HEP. · Recommendations/Intent for next treatment session: \"Next visit will focus on gradual restoration of PROM with progression to [facility] and gentle strengthening over next several weeks within guidelines of rotator cuff rehabilitation protocol. FREQUENCY/DURATION: Follow patient 3 times a week for 12 weeks to address above goals, and upon reassessment will adjust frequency and duration as progress indicates.   Total Treatment Duration:  PT Patient Time In/Time Out  Time In: 1215  Time Out: 1305  No. Of Visits: 6/11  Waldo Carbone, PT,

## 2017-06-19 ENCOUNTER — HOSPITAL ENCOUNTER (OUTPATIENT)
Dept: PHYSICAL THERAPY | Age: 37
Discharge: HOME | End: 2017-06-19
Payer: COMMERCIAL

## 2017-06-19 PROCEDURE — 97140 MANUAL THERAPY 1/> REGIONS: CPT

## 2017-06-19 PROCEDURE — 97110 THERAPEUTIC EXERCISES: CPT

## 2017-06-19 NOTE — PROGRESS NOTES
Alessia Diaz  : 1980 [facility] at [facility]  [address], [facility], [address]  [phone]   [phone]         OUTPATIENT PHYSICAL THERAPY:Daily Note 2017    ICD-10: Treatment Diagnosis: Pain in right shoulder (M25.511)  Incomplete rotator cuff teaImpingement syndrome of right shoulder (M75.41)r or rupture of right shoulder, not specified as traumatic (M75.111)    Precautions/Allergies:   Penicillins and Sulfa (sulfonamide antibiotics)  Fall Risk Score: 1 (? 5 = High Risk)  MD Orders: Evaluate and treat MEDICAL/REFERRING DIAGNOSIS:  Impingement syndrome of right shoulder [M75.41]  Incomplete rotator cuff tear or rupture of right shoulder, not specified as traumatic [M75.111]   DATE OF ONSET: chronic  REFERRING PHYSICIAN: Pepito Hardy MD  RETURN PHYSICIAN APPOINTMENT: 17     ASSESSMENT:  Mr. Ulises Purcell initially presented with chronic R shoulder pain with overhead and lifting ADL's with recent worsening of symptoms. His workout regimen of weight lifting was likely contributing to symptoms. . He had decreased mobility with shoulder extension and IR along with decreased strength rotator cuff/scapular musculature and associated pain. He was seen for 4 visits prior to undergoing surgery on 17 to repair rotator cuff, labral tear, along with subacromial decompression. Additionally he had ulnar nerve release on same arm for cubital tunnel syndrome. He is now returning for physical therapy following surgery to gradually restore ROM with later progression to strengthening and restoration of functional use of R UE. PROBLEM LIST (Impacting functional limitations):  1. Decreased Strength  2. Decreased ADL/Functional Activities  3. Increased Pain  4. Decreased Flexibility/Joint Mobility  5. Decreased [place] with Home Exercise Program INTERVENTIONS PLANNED:  1. Home Exercise Program (HEP)  2. Manual Therapy  3.  Range of Motion (ROM)  4. Therapeutic Exercise/Strengthening  5. modalities/taping as indicated   TREATMENT PLAN:  Effective Dates: 5/31/17 TO 8/31/17. Frequency/Duration: 2 times a week for 12 weeks  GOALS: (Goals have been discussed and agreed upon with patient.)  - goals revised due to recent surgery  Short-Term Functional Goals: Time Frame: 2-4 weeks  1. Increase shoulder flexion PROM to >140 deg and ER >45 deg to allow full AROM shoulder  2. Decrease pain level by 2/10 for reaching and lifting ADL's  3. [place] with HEP with minimal cueing  4. Patient to be aware of post-op precautions with posture, positioning, and movt to protect repair. DISCHARGE GOALS: Time Frame: 12 weeks  1. Restore full ROM R shoulder for performance of all UE ADL's  2. Patient to report minimal levels of pain with normal ADL's  3. Increase strength to at least 4/5 in all muscle groups for return to recreational activities  4. Improve score on DASH by > 8 points for ease with dressing and lifting ADL's  5. [place] with advanced HEP with no cueing  Rehabilitation Potential For Stated Goals: GOOD  Regarding Louis Henning Sr.'s therapy, I certify that the treatment plan above will be carried out by a therapist or under their direction. Thank you for this referral,  Bertin Solis PT     Referring Physician Signature: Garret Díaz MD              Date                    The information in this section was collected on 4/17/17 (except where otherwise noted). HISTORY:   History of Present Injury/Illness (Reason for Referral):  Patient reports pain in R shoulder on and off for past couple of years, with recent worsening of symptoms in past couple of months. Also reports intermittent popping/clicking with certain movts. Pain mostly present with reaching overhead, lifting/carrying heavy objects. Sometimes it will ache for part of the day. Sometimes painful at night.  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 Works out 5 days/week doing weight lifting - has modified this recently due to shoulder pain (normally would do bench press, incline press, lat pull-downs, rows, front and lateral raises etc. Has stopped pull-ups due to pain and used lighter weights for other ex's. Also has R elbow pain with numbness in lateral fingers - cubital tunnel syndrome which he will have surgery for. States that he is waiting for return call from doctor regarding possibly having shoulder surgery at the same time if he is going to need it at later time anyway. Had cortisone shot 2 weeks ago with no relief of pain and possibly worsening of symptoms. 5/31/17: Patient had surgery 5/19 - has been in sling since then. Took pain meds a few days but no longer taking anything. Pain moderate - mostly bothersome at night. Still has some numbness in lateral 2 fingers. Past Medical History/Comorbidities:   Mr. Marielena Man  has a past medical history of Arthritis; GERD (gastroesophageal reflux disease); HTN (hypertension); and Nausea & vomiting. He also has no past medical history of Difficult intubation; Malignant hyperthermia due to anesthesia; or Pseudocholinesterase deficiency. Mr. Marielena Man  has a past surgical history that includes wisdom teeth extraction and other surgical.  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 (Patient taking differently: Take 20 mg by mouth daily.  Indications: hypercholesterolemia), Disp: 90 Tab, Rfl: 3    varenicline (CHANTIX) 1 mg tablet, Take 1 Tab by mouth two (2) times a day., Disp: 60 Tab, Rfl: 5    lisinopril-hydroCHLOROthiazide (PRINZIDE, ZESTORETIC) 20-25 mg per tablet, Take 1 Tab by mouth daily. , Disp: 90 Tab, Rfl: 3    amLODIPine (NORVASC) 5 mg tablet, Take 1 Tab by mouth daily. (Patient taking differently: Take 5 mg by mouth daily. Indications: hypertension), Disp: 90 Tab, Rfl: 3   Date Last Reviewed:  6/19/2017     Number of Personal Factors/Comorbidities that affect the Plan of Care: 1-2: MODERATE COMPLEXITY   EXAMINATION:   Observation/Orthostatic Postural Assessment: Very muscular build with moderately rounded shoulders. R UE immobilised in sling with abduction pillow. Bandaging around elbow, forearm. Mild residual swelling. Palpation:    Moderate generalised tenderness s/ surgery  ROM:       LUE Assessment  LUE Assessment (WDL): Exceptions to WDL  LUE AROM  L Shoulder Flexion: 160  L Shoulder Extension: 35  L Shoulder Internal Rotation: 50  L Shoulder External Rotation: 95              RUE Assessment  RUE Assessment (WDL): Exception to WDL  RUE PROM  R Shoulder Flexion: 40   R shoulder abduction 45  R Shoulder Internal Rotation: 25  R Shoulder External Rotation: 20   R elbow ext - 5  Mod to marked pain all movts  Not fully tested due to surgery      Strength:    LUE Strength, Tone, Sensation  LUE Strength, Tone, Sensation (WDL): Within defined limits        RUE Strength, Tone, Sensation  RUE Strength, Tone, Sensation (WDL): Exception to WDL  RUE Strength nt due to recent surgery             Special Tests:   Neurological Screen:  WDL  (except for mild numbness 4th and 5th fingers due to cubital tunnel syndrome)  Functional Mobility:      limited with all UE ADL's  Balance: WDL   Body Structures Involved:  1. Nerves  2. Joints  3. Muscles  4. Ligaments Body Functions Affected:  1. Sensory/Pain  2. Neuromusculoskeletal  3. Movement Related Activities and Participation Affected:  1. General Tasks and Demands  2. Mobility  3.  Self Care   Number of elements (examined above) that affect the Plan of Care: 3: MODERATE COMPLEXITY   CLINICAL PRESENTATION:   Presentation: Evolving clinical presentation with changing clinical characteristics: MODERATE COMPLEXITY   CLINICAL DECISION MAKING:   Outcome Measure: Tool Used: Disabilities of the Arm, Shoulder and Hand (DASH) Questionnaire - Quick Version  Score:  Initial: 20/55  Most Recent: 41/55 (Date: 5/31/17 )   Interpretation of Score: The DASH is designed to measure the activities of daily living in person's with upper extremity dysfunction or pain. Each section is scored on a 1-5 scale, 5 representing the greatest disability. The scores of each section are added together for a total score of 55. Score 11 12-19 20-28 29-37 38-45 46-54 55   Modifier CH CI CJ CK CL CM CN   ** decreased score due to recent surgery    Medical Necessity:   · Patient is expected to demonstrate progress in strength, range of motion and functional technique to increase independence with all UE ADL's. · Skilled intervention continues to be required due to pain, decreased ROM & strength limiting UE ADL's. Reason for Services/Other Comments:  · Patient continues to continues to require modification of therapeutic interventions to increase complexity of exercises. · Patient continues to require skilled intervention due to pain, decreased ROM, and strength limiting UE ADL's. Use of outcome tool(s) and clinical judgement create a POC that gives a: Questionable prediction of patient's progress: MODERATE COMPLEXITY            TREATMENT:   (In addition to Assessment/Re-Assessment sessions the following treatments were rendered)  Pre-treatment Symptoms/Complaints: Patient states he is tolerating going without sling much better now. Pain: Initial:   Pain Intensity 1: 4  Post Session:  4     Therapeutic Exercise: (40 ):  Exercises as outlined below to improve mobility and strength. Required moderate verbal and tactile cues to promote proper body posture and promote proper body mechanics. Progressed resistance, range and repetitions as indicated. Reciprocal pulleys ~ 8 mins for passive & AAROM  Active & resisted elbow flexion/ext  Biceps curls, 10#, 1 x 12  Resisted scap ex - elev/dep, ret/prot, modified PNF  Supine sh prot, 2 x 10, 4#  Supine sh flex  deg, red Tband, 1 x 10  Gentle resisted ER/IR at 45 deg abd  Rhythmic stab IR/ER at 45 & 90 deg abd                        Flex/ext at 90 & 110 deg  Sidelying ER, 2 x 10 , 1#  Prone rows, 5#, 2 x 10  Prone ext, 2#, 2 x 10  Scap depression ex's - isometric into table, 10 reps  Sh IR, red Tband, 2 x 10  Sh ext, red Tband, 2 x 10  Sh ER, yellow Tband, 2 x 10  Active sh flex, scaption, 2 x 10 ea   Wall slides, bent elbow, 2 x 10  Manual Therapy (20 min): PROM/stretching for sh flexion, abduction, ER/IR. Gentle jt mobs. Isometrics flex, ext, abd, IR/ER. Passive stretching as edd to regain full elbow ROM, STM to biceps area and gentle scar tissue mob post elbow    HEP: As directed. To add Tband ex's  Therapeutic Modalities: ice prn    Treatment/Session Assessment:    · Response to Treatment:  Patient tolerated treatment well - tolerating strengthening without significant pain. Able to tolerate flexion ROM with minimal pain with ROM to ~ 140 deg. Gradual improvement in IR/ER ROM with no muscle spasm - mild residual limitation in IR. ER WDL. Will continue gradual progression of strengthening to restore functional use of R UE. Mild shoulder shrug with sh elevation in standing as yet. · Compliance with Program/Exercises: Compliant with HEP. · Recommendations/Intent for next treatment session: \"Next visit will focus on gradual restoration of PROM with progression to [facility] and gentle strengthening over next several weeks within guidelines of rotator cuff rehabilitation protocol. FREQUENCY/DURATION: Follow patient 3 times a week for 12 weeks to address above goals, and upon reassessment will adjust frequency and duration as progress indicates.   Total Treatment Duration:  PT Patient Time In/Time Out  Time In: 1215  Time Out: 1315  No. Of Visits: 7/12  Steve Romero, PT,

## 2017-06-22 PROBLEM — E66.9 OBESITY (BMI 30-39.9): Status: ACTIVE | Noted: 2017-06-22

## 2017-06-22 PROBLEM — G47.10 HYPERSOMNIA: Status: ACTIVE | Noted: 2017-06-22

## 2017-06-22 PROBLEM — G47.33 OSA (OBSTRUCTIVE SLEEP APNEA): Status: ACTIVE | Noted: 2017-06-22

## 2017-06-23 ENCOUNTER — HOSPITAL ENCOUNTER (OUTPATIENT)
Dept: PHYSICAL THERAPY | Age: 37
Discharge: HOME | End: 2017-06-23
Payer: COMMERCIAL

## 2017-06-23 PROCEDURE — 97140 MANUAL THERAPY 1/> REGIONS: CPT

## 2017-06-23 PROCEDURE — 97110 THERAPEUTIC EXERCISES: CPT

## 2017-06-23 NOTE — PROGRESS NOTES
Oraileen Garciavo  : 1980 [facility]  [address], [facility], [address]  [phone]   [phone]         OUTPATIENT PHYSICAL THERAPY:Daily Note 2017    ICD-10: Treatment Diagnosis: Pain in right shoulder (M25.511)  Incomplete rotator cuff teaImpingement syndrome of right shoulder (M75.41)r or rupture of right shoulder, not specified as traumatic (M75.111)    Precautions/Allergies:   Penicillins and Sulfa (sulfonamide antibiotics)  Fall Risk Score: 1 (? 5 = High Risk)  MD Orders: Evaluate and treat MEDICAL/REFERRING DIAGNOSIS:  Impingement syndrome of right shoulder [M75.41]  Incomplete rotator cuff tear or rupture of right shoulder, not specified as traumatic [M75.111]   DATE OF ONSET: chronic  REFERRING PHYSICIAN: Jc Fitch MD  RETURN PHYSICIAN APPOINTMENT: 17     ASSESSMENT:  Mr. Hernandez Chandler initially presented with chronic R shoulder pain with overhead and lifting ADL's with recent worsening of symptoms. His workout regimen of weight lifting was likely contributing to symptoms. . He had decreased mobility with shoulder extension and IR along with decreased strength rotator cuff/scapular musculature and associated pain. He was seen for 4 visits prior to undergoing surgery on 17 to repair rotator cuff, labral tear, along with subacromial decompression. Additionally he had ulnar nerve release on same arm for cubital tunnel syndrome. He is now returning for physical therapy following surgery to gradually restore ROM with later progression to strengthening and restoration of functional use of R UE. PROBLEM LIST (Impacting functional limitations):  1. Decreased Strength  2. Decreased ADL/Functional Activities  3. Increased Pain  4. Decreased Flexibility/Joint Mobility  5. Decreased [place] with Home Exercise Program INTERVENTIONS PLANNED:  1. Home Exercise Program (HEP)  2. Manual Therapy  3.  Range of Motion (ROM)  4. Therapeutic Exercise/Strengthening  5. modalities/taping as indicated   TREATMENT PLAN:  Effective Dates:5/31/17 TO 8/31/17. Frequency/Duration: 2 times a week for 12 weeks  GOALS: (Goals have been discussed and agreed upon with patient.)  - goals revised due to recent surgery  Short-Term Functional Goals: Time Frame: 2-4 weeks  1. Increase shoulder flexion PROM to >140 deg and ER >45 deg to allow full AROM shoulder  2. Decrease pain level by 2/10 for reaching and lifting ADL's  3. Hyde with HEP with minimal cueing  4. Patient to be aware of post-op precautions with posture, positioning, and movt to protect repair. DISCHARGE GOALS: Time Frame: 12 weeks  1. Restore full ROM R shoulder for performance of all UE ADL's  2. Patient to report minimal levels of pain with normal ADL's  3. Increase strength to at least 4/5 in all muscle groups for return to recreational activities  4. Improve score on DASH by > 8 points for ease with dressing and lifting ADL's  5. Hyde with advanced HEP with no cueing  Rehabilitation Potential For Stated Goals: GOOD  Regarding Maral Mast Sr.'s therapy, I certify that the treatment plan above will be carried out by a therapist or under their direction. Thank you for this referral,  Florentin Mckinnon PT     Referring Physician Signature: Vero Stanton MD              Date                    The information in this section was collected on 4/17/17 (except where otherwise noted). HISTORY:   History of Present Injury/Illness (Reason for Referral):  Patient reports pain in R shoulder on and off for past couple of years, with recent worsening of symptoms in past couple of months. Also reports intermittent popping/clicking with certain movts. Pain mostly present with reaching overhead, lifting/carrying heavy objects. Sometimes it will ache for part of the day. Sometimes painful at night.  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 Works out 5 days/week doing weight lifting - has modified this recently due to shoulder pain (normally would do bench press, incline press, lat pull-downs, rows, front and lateral raises etc. Has stopped pull-ups due to pain and used lighter weights for other ex's. Also has R elbow pain with numbness in lateral fingers - cubital tunnel syndrome which he will have surgery for. States that he is waiting for return call from doctor regarding possibly having shoulder surgery at the same time if he is going to need it at later time anyway. Had cortisone shot 2 weeks ago with no relief of pain and possibly worsening of symptoms. 5/31/17: Patient had surgery 5/19 - has been in sling since then. Took pain meds a few days but no longer taking anything. Pain moderate - mostly bothersome at night. Still has some numbness in lateral 2 fingers. Past Medical History/Comorbidities:   Mr. Jasen Chaney  has a past medical history of Arthritis; GERD (gastroesophageal reflux disease); HTN (hypertension); and Nausea & vomiting. He also has no past medical history of Difficult intubation; Malignant hyperthermia due to anesthesia; or Pseudocholinesterase deficiency. Mr. Jasen Chaney  has a past surgical history that includes wisdom teeth extraction and other surgical.  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 (Patient taking differently: Take 20 mg by mouth daily. Indications: hypercholesterolemia), Disp: 90 Tab, Rfl: 3    lisinopril-hydroCHLOROthiazide (PRINZIDE, ZESTORETIC) 20-25 mg per tablet, Take 1 Tab by mouth daily. , Disp: 90 Tab, Rfl: 3    amLODIPine (NORVASC) 5 mg tablet, Take 1 Tab by mouth daily. (Patient taking differently: Take 5 mg by mouth daily. Indications: hypertension), Disp: 90 Tab, Rfl: 3   Date Last Reviewed:  6/23/2017     Number of Personal Factors/Comorbidities that affect the Plan of Care: 1-2: MODERATE COMPLEXITY   EXAMINATION:   Observation/Orthostatic Postural Assessment: Very muscular build with moderately rounded shoulders. R UE immobilised in sling with abduction pillow. Bandaging around elbow, forearm. Mild residual swelling. Palpation:    Moderate generalised tenderness s/ surgery  ROM:       LUE Assessment  LUE Assessment (WDL): Exceptions to WDL  LUE AROM  L Shoulder Flexion: 160  L Shoulder Extension: 35  L Shoulder Internal Rotation: 50  L Shoulder External Rotation: 95              RUE Assessment  RUE Assessment (WDL): Exception to WDL  RUE PROM  R Shoulder Flexion: 40   R shoulder abduction 45  R Shoulder Internal Rotation: 25  R Shoulder External Rotation: 20   R elbow ext - 5  Mod to marked pain all movts  Not fully tested due to surgery      Strength:    LUE Strength, Tone, Sensation  LUE Strength, Tone, Sensation (WDL): Within defined limits        RUE Strength, Tone, Sensation  RUE Strength, Tone, Sensation (WDL): Exception to WDL  RUE Strength nt due to recent surgery             Special Tests:   Neurological Screen:  WDL  (except for mild numbness 4th and 5th fingers due to cubital tunnel syndrome)  Functional Mobility:      limited with all UE ADL's  Balance: WDL   Body Structures Involved:  1. Nerves  2. Joints  3. Muscles  4. Ligaments Body Functions Affected:  1. Sensory/Pain  2. Neuromusculoskeletal  3. Movement Related Activities and Participation Affected:  1. General Tasks and Demands  2. Mobility  3.  Self Care   Number of elements (examined above) that affect the Plan of Care: 3: MODERATE COMPLEXITY   CLINICAL PRESENTATION:   Presentation: Evolving clinical presentation with changing clinical characteristics: MODERATE COMPLEXITY   CLINICAL DECISION MAKING:   Outcome Measure: Tool Used: Disabilities of the Arm, Shoulder and Hand (DASH) Questionnaire - Quick Version  Score:  Initial: 20/55  Most Recent: 41/55 (Date: 5/31/17 )   Interpretation of Score: The DASH is designed to measure the activities of daily living in person's with upper extremity dysfunction or pain. Each section is scored on a 1-5 scale, 5 representing the greatest disability. The scores of each section are added together for a total score of 55. Score 11 12-19 20-28 29-37 38-45 46-54 55   Modifier CH CI CJ CK CL CM CN   ** decreased score due to recent surgery    Medical Necessity:   · Patient is expected to demonstrate progress in strength, range of motion and functional technique to increase independence with all UE ADL's. · Skilled intervention continues to be required due to pain, decreased ROM & strength limiting UE ADL's. Reason for Services/Other Comments:  · Patient continues to continues to require modification of therapeutic interventions to increase complexity of exercises. · Patient continues to require skilled intervention due to pain, decreased ROM, and strength limiting UE ADL's. Use of outcome tool(s) and clinical judgement create a POC that gives a: Questionable prediction of patient's progress: MODERATE COMPLEXITY            TREATMENT:   (In addition to Assessment/Re-Assessment sessions the following treatments were rendered)  Pre-treatment Symptoms/Complaints: Patient states shoulder gradually improving. Got results of sleep study and confirmed he has sleep apnea - to get fitted for CPAP machine. Pain: Initial:   Pain Intensity 1: 4  Post Session:  4     Therapeutic Exercise: (40 ):  Exercises as outlined below to improve mobility and strength. Required moderate verbal and tactile cues to promote proper body posture and promote proper body mechanics. Progressed resistance, range and repetitions as indicated.      Reciprocal pulleys ~ 8 mins for passive & AAROM  Active & resisted elbow flexion/ext  Biceps curls, 10#, 2 x 8 (slight elbow pain)  Resisted scap ex - elev/dep, ret/prot, modified PNF  Supine sh prot, 2 x 10, 7#  Supine sh flex  deg, red Tband, 2 x 10  Resisted ER/IR at 45 deg abd  Rhythmic stab IR/ER at 45 & 90 deg abd                        Flex/ext at 90 & 110 deg  Sidelying ER, 2 x 10 , 1#  Prone rows, 6#, 2 x 10  Prone ext, 2#, 2 x 10  Scap depression ex's - isometric into table, 10 reps  Sh IR, greenTband, 2 x 10  Jeremiah Sh ext & rows, greenTband, 2 x 10  Sh ER, red Tband, 2 x 10  Active sh flex, scaption, 2 x 10 ea - in mirror to minimise sh shrug  Wall slides, bent elbow, 2 x 10  Sh press jeremiah, red Tband, 1 x 10  Sh \"scoops\" red Tband, 1 x 10  Manual Therapy (20 min): PROM/stretching for sh flexion, abduction, ER/IR. Gentle jt mobs. Isometrics flex, ext, abd, IR/ER. Passive stretching as edd to regain full elbow ROM, STM to biceps area and gentle scar tissue mob post elbow    HEP: As directed. New sheets issued  Therapeutic Modalities: ice prn    Treatment/Session Assessment:    · Response to Treatment:  Patient tolerated treatment well - tolerating strengthening without significant pain. Able to tolerate flexion ROM with minimal pain with ROM to ~ 140 deg. Gradual improvement in IR/ER ROM with no muscle spasm - mild residual limitation in IR. ER WDL. Also 2-3 deg restriction in full elbow ext - instructed to do self-stretching. Will continue gradual progression of strengthening to restore functional use of R UE. Mild shoulder shrug with sh elevation in standing as yet but improved today compared to Monday. Patient to be on holiday next weekend - reviewed exercises to work on while gone. Foolow up on return  · Compliance with Program/Exercises: Compliant with HEP. · Recommendations/Intent for next treatment session:  \"Next visit will focus on gradual restoration of PROM with progression to [facility] and gentle strengthening over next several weeks within guidelines of rotator cuff rehabilitation protocol. FREQUENCY/DURATION: Follow patient 3 times a week for 12 weeks to address above goals, and upon reassessment will adjust frequency and duration as progress indicates.   Total Treatment Duration:  PT Patient Time In/Time Out  Time In: 1230  Time Out: 1330  No. Of Visits: 8/13  Kb Bright PT,

## 2017-06-26 ENCOUNTER — HOSPITAL ENCOUNTER (OUTPATIENT)
Dept: PHYSICAL THERAPY | Age: 37
Discharge: HOME | End: 2017-06-26
Payer: COMMERCIAL

## 2017-06-26 NOTE — PROGRESS NOTES
[facility]   [address], [place] [facility] [address]  [phone]   [phone]    OUTPATIENT DAILY NOTE    NAME/AGE/GENDER: Faisal Espinoza is a 29 y.o. male. DATE: 6/26/2017    Patient cancelled his appointment today due to being out of town on holiday. Will plan to follow up on next scheduled visit.     Nils Bullard, PT

## 2017-06-30 ENCOUNTER — APPOINTMENT (OUTPATIENT)
Dept: PHYSICAL THERAPY | Age: 37
End: 2017-06-30
Payer: COMMERCIAL

## 2017-07-03 ENCOUNTER — HOSPITAL ENCOUNTER (OUTPATIENT)
Dept: PHYSICAL THERAPY | Age: 37
Discharge: HOME | End: 2017-07-03
Payer: COMMERCIAL

## 2017-07-03 PROCEDURE — 97140 MANUAL THERAPY 1/> REGIONS: CPT

## 2017-07-03 PROCEDURE — 97110 THERAPEUTIC EXERCISES: CPT

## 2017-07-03 NOTE — PROGRESS NOTES
Kassandra Brooks  : 1980 [facility] at [facility]  [address], [facility], [address]  [phone]   [phone]         OUTPATIENT PHYSICAL THERAPY:Daily Note 7/3/2017    ICD-10: Treatment Diagnosis: Pain in right shoulder (M25.511)  Incomplete rotator cuff teaImpingement syndrome of right shoulder (M75.41)r or rupture of right shoulder, not specified as traumatic (M75.111)    Precautions/Allergies:   Penicillins and Sulfa (sulfonamide antibiotics)  Fall Risk Score: 1 (? 5 = High Risk)  MD Orders: Evaluate and treat MEDICAL/REFERRING DIAGNOSIS:  Impingement syndrome of right shoulder [M75.41]  Incomplete rotator cuff tear or rupture of right shoulder, not specified as traumatic [M75.111]   DATE OF ONSET: chronic  REFERRING PHYSICIAN: Anali Wang MD  RETURN PHYSICIAN APPOINTMENT: 17     ASSESSMENT:  Mr. Kevin Hubbard initially presented with chronic R shoulder pain with overhead and lifting ADL's with recent worsening of symptoms. His workout regimen of weight lifting was likely contributing to symptoms. . He had decreased mobility with shoulder extension and IR along with decreased strength rotator cuff/scapular musculature and associated pain. He was seen for 4 visits prior to undergoing surgery on 17 to repair rotator cuff, labral tear, along with subacromial decompression. Additionally he had ulnar nerve release on same arm for cubital tunnel syndrome. He is now returning for physical therapy following surgery to gradually restore ROM with later progression to strengthening and restoration of functional use of R UE. PROBLEM LIST (Impacting functional limitations):  1. Decreased Strength  2. Decreased ADL/Functional Activities  3. Increased Pain  4. Decreased Flexibility/Joint Mobility  5. Decreased [place] with Home Exercise Program INTERVENTIONS PLANNED:  1. Home Exercise Program (HEP)  2. Manual Therapy  3.  Range of Motion (ROM)  4. Therapeutic Exercise/Strengthening  5. modalities/taping as indicated   TREATMENT PLAN:  Effective Dates: 5/31/17 TO 8/31/17. Frequency/Duration: 2 times a week for 12 weeks  GOALS: (Goals have been discussed and agreed upon with patient.)  - goals revised due to recent surgery  Short-Term Functional Goals: Time Frame: 2-4 weeks  1. Increase shoulder flexion PROM to >140 deg and ER >45 deg to allow full AROM shoulder  2. Decrease pain level by 2/10 for reaching and lifting ADL's  3. [place] with HEP with minimal cueing  4. Patient to be aware of post-op precautions with posture, positioning, and movt to protect repair. DISCHARGE GOALS: Time Frame: 12 weeks  1. Restore full ROM R shoulder for performance of all UE ADL's  2. Patient to report minimal levels of pain with normal ADL's  3. Increase strength to at least 4/5 in all muscle groups for return to recreational activities  4. Improve score on DASH by > 8 points for ease with dressing and lifting ADL's  5. [place] with advanced HEP with no cueing  Rehabilitation Potential For Stated Goals: GOOD  Regarding Manjinder Hanson Sr.'s therapy, I certify that the treatment plan above will be carried out by a therapist or under their direction. Thank you for this referral,  Lloyd Murray PT     Referring Physician Signature: Magali Higginbotham MD              Date                    The information in this section was collected on 4/17/17 (except where otherwise noted). HISTORY:   History of Present Injury/Illness (Reason for Referral):  Patient reports pain in R shoulder on and off for past couple of years, with recent worsening of symptoms in past couple of months. Also reports intermittent popping/clicking with certain movts. Pain mostly present with reaching overhead, lifting/carrying heavy objects. Sometimes it will ache for part of the day. Sometimes painful at night.  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 Works out 5 days/week doing weight lifting - has modified this recently due to shoulder pain (normally would do bench press, incline press, lat pull-downs, rows, front and lateral raises etc. Has stopped pull-ups due to pain and used lighter weights for other ex's. Also has R elbow pain with numbness in lateral fingers - cubital tunnel syndrome which he will have surgery for. States that he is waiting for return call from doctor regarding possibly having shoulder surgery at the same time if he is going to need it at later time anyway. Had cortisone shot 2 weeks ago with no relief of pain and possibly worsening of symptoms. 5/31/17: Patient had surgery 5/19 - has been in sling since then. Took pain meds a few days but no longer taking anything. Pain moderate - mostly bothersome at night. Still has some numbness in lateral 2 fingers. Past Medical History/Comorbidities:   Mr. Bishnu Hansen  has a past medical history of Arthritis; GERD (gastroesophageal reflux disease); HTN (hypertension); and Nausea & vomiting. He also has no past medical history of Difficult intubation; Malignant hyperthermia due to anesthesia; or Pseudocholinesterase deficiency. Mr. Bishnu Hansen  has a past surgical history that includes wisdom teeth extraction and other surgical.  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 (Patient taking differently: Take 20 mg by mouth daily. Indications: hypercholesterolemia), Disp: 90 Tab, Rfl: 3    lisinopril-hydroCHLOROthiazide (PRINZIDE, ZESTORETIC) 20-25 mg per tablet, Take 1 Tab by mouth daily. , Disp: 90 Tab, Rfl: 3    amLODIPine (NORVASC) 5 mg tablet, Take 1 Tab by mouth daily. (Patient taking differently: Take 5 mg by mouth daily. Indications: hypertension), Disp: 90 Tab, Rfl: 3   Date Last Reviewed:  7/3/2017     Number of Personal Factors/Comorbidities that affect the Plan of Care: 1-2: MODERATE COMPLEXITY   EXAMINATION:   Observation/Orthostatic Postural Assessment: Very muscular build with moderately rounded shoulders. R UE immobilised in sling with abduction pillow. Bandaging around elbow, forearm. Mild residual swelling. Palpation:    Moderate generalised tenderness s/ surgery  ROM:       LUE Assessment  LUE Assessment (WDL): Exceptions to WDL  LUE AROM  L Shoulder Flexion: 160  L Shoulder Extension: 35  L Shoulder Internal Rotation: 50  L Shoulder External Rotation: 95              RUE Assessment  RUE Assessment (WDL): Exception to WDL  RUE PROM  R Shoulder Flexion: 40   R shoulder abduction 45  R Shoulder Internal Rotation: 25  R Shoulder External Rotation: 20   R elbow ext - 5  Mod to marked pain all movts  Not fully tested due to surgery      Strength:    LUE Strength, Tone, Sensation  LUE Strength, Tone, Sensation (WDL): Within defined limits        RUE Strength, Tone, Sensation  RUE Strength, Tone, Sensation (WDL): Exception to WDL  RUE Strength nt due to recent surgery             Special Tests:   Neurological Screen:  WDL  (except for mild numbness 4th and 5th fingers due to cubital tunnel syndrome)  Functional Mobility:      limited with all UE ADL's  Balance: WDL   Body Structures Involved:  1. Nerves  2. Joints  3. Muscles  4. Ligaments Body Functions Affected:  1. Sensory/Pain  2. Neuromusculoskeletal  3. Movement Related Activities and Participation Affected:  1. General Tasks and Demands  2. Mobility  3.  Self Care   Number of elements (examined above) that affect the Plan of Care: 3: MODERATE COMPLEXITY   CLINICAL PRESENTATION:   Presentation: Evolving clinical presentation with changing clinical characteristics: MODERATE COMPLEXITY   CLINICAL DECISION MAKING:   Outcome Measure: Tool Used: Disabilities of the Arm, Shoulder and Hand (DASH) Questionnaire - Quick Version  Score:  Initial: 20/55  Most Recent: 41/55 (Date: 5/31/17 )   Interpretation of Score: The DASH is designed to measure the activities of daily living in person's with upper extremity dysfunction or pain. Each section is scored on a 1-5 scale, 5 representing the greatest disability. The scores of each section are added together for a total score of 55. Score 11 12-19 20-28 29-37 38-45 46-54 55   Modifier CH CI CJ CK CL CM CN   ** decreased score due to recent surgery    Medical Necessity:   · Patient is expected to demonstrate progress in strength, range of motion and functional technique to increase independence with all UE ADL's. · Skilled intervention continues to be required due to pain, decreased ROM & strength limiting UE ADL's. Reason for Services/Other Comments:  · Patient continues to continues to require modification of therapeutic interventions to increase complexity of exercises. · Patient continues to require skilled intervention due to pain, decreased ROM, and strength limiting UE ADL's. Use of outcome tool(s) and clinical judgement create a POC that gives a: Questionable prediction of patient's progress: MODERATE COMPLEXITY            TREATMENT:   (In addition to Assessment/Re-Assessment sessions the following treatments were rendered)  Pre-treatment Symptoms/Complaints: Patient did okay while away - shoulder a little sore but not bad  Pain: Initial:   Pain Intensity 1: 3  Post Session:  3     Therapeutic Exercise: (45 ):  Exercises as outlined below to improve mobility and strength. Required moderate verbal and tactile cues to promote proper body posture and promote proper body mechanics. Progressed resistance, range and repetitions as indicated.      Reciprocal pulleys ~ 5 mins for ROM  UBE mod resistance, 5 mins  Active & resisted elbow flexion/ext  Biceps curls, 10#, 2 x 8  Resisted scap ex - elev/dep, ret/prot, modified PNF  Supine sh prot, 2 x 10, 8#  Supine sh flex  deg, red Tband, 2 x 10  Supine sh PNF patterns, red Tband. 2 x 10  Rhythmic stab IR/ER at 45 & 90 deg abd                        Flex/ext at 90 & 110 deg  Sidelying ER, 2 x 10 , 2#  Prone rows, 7#, 2 x 10  Prone ext, 3#, 2 x 10  Scap depression ex's - isometric into table, 10 reps  Sh IR, greenTband, 2 x 10  Jeremiah Sh ext & rows, greenTband, 2 x 10  Sh ER, red Tband, 2 x 10  Active sh flex, scaption, 2 x 10 ea - in mirror to minimise sh shrug  Wall slides, bent elbow, 2 x 10  Sh press jeremiah, red Tband, 1 x 10  Sh \"scoops\" red Tband, 1 x 10  Wall circles  Boing   Manual Therapy (15 min): PROM/stretching for sh flexion, abduction, ER/IR. Gentle jt mobs. Isometrics flex, ext, abd, IR/ER. Passive stretching as edd to regain full elbow ROM, STM to biceps area and gentle scar tissue mob post elbow    HEP: As directed. Therapeutic Modalities: ice prn    Treatment/Session Assessment:    · Response to Treatment:  Patient tolerated treatment well - good steady progress continues. Able to tolerate flexion ROM with minimal pain with ROM to ~ 145 deg. Gradual improvement in IR/ER ROM with no muscle spasm - mild residual limitation in IR. ER WDL. Elbow ext close to full range. Will continue gradual progression of strengthening to restore functional use of R UE. Less shoulder shrug with sh elevation in standing   · Compliance with Program/Exercises: Compliant with HEP. · Recommendations/Intent for next treatment session: \"Next visit will focus on restoring full ROM and progressive strengthening over next several weeks within guidelines of rotator cuff rehabilitation protocol. FREQUENCY/DURATION: Follow patient 2 times a week for 12 weeks to address above goals, and upon reassessment will adjust frequency and duration as progress indicates.   Total Treatment Duration:  PT Patient Time In/Time Out  Time In: 1000  Time Out: 1100  No. Of Visits: 9/14  Silvana Ordonez PT,

## 2017-07-07 ENCOUNTER — HOSPITAL ENCOUNTER (OUTPATIENT)
Dept: PHYSICAL THERAPY | Age: 37
Discharge: HOME | End: 2017-07-07
Payer: COMMERCIAL

## 2017-07-07 PROCEDURE — 97140 MANUAL THERAPY 1/> REGIONS: CPT

## 2017-07-07 PROCEDURE — 97110 THERAPEUTIC EXERCISES: CPT

## 2017-07-07 NOTE — PROGRESS NOTES
Chenchokayden Cleaning Sr.  : 1980 [facility] at [facility]  [address], [address]  [phone]   [phone]         OUTPATIENT PHYSICAL THERAPY:Daily Note and Progress Report 2017    ICD-10: Treatment Diagnosis: Pain in right shoulder (M25.511)  Incomplete rotator cuff teaImpingement syndrome of right shoulder (M75.41)r or rupture of right shoulder, not specified as traumatic (M75.111)    Precautions/Allergies:   Penicillins and Sulfa (sulfonamide antibiotics)  Fall Risk Score: 1 (? 5 = High Risk)  MD Orders: Evaluate and treat MEDICAL/REFERRING DIAGNOSIS:  Impingement syndrome of right shoulder [M75.41]  Incomplete rotator cuff tear or rupture of right shoulder, not specified as traumatic [M75.111]   DATE OF ONSET: chronic  REFERRING PHYSICIAN: Ron Fam MD  RETURN PHYSICIAN APPOINTMENT: 17     ASSESSMENT:  Mr. Seth Green is now about 6 weeks s/p R rotator cuff repair and has been seen in therapy for PROM gradually progressing to AROM and light strengthening exercises in accordance with rehab protocol. He is doing very well with close to full ROM except for some minor residual restrictions in overhead elevation and IR. He has slightly abnormal scapulohumeral rhythm as yet which should improve with improving scapula and rotator cuff strength Hecan continue to benefit from skilled therapy to fully restore ROM and progress with more aggressive strengthening to allow return of normal functtional use of R UE and modified recreational activities. PROBLEM LIST (Impacting functional limitations):  1. Decreased Strength  2. Decreased ADL/Functional Activities  3. Increased Pain  4. Decreased Flexibility/Joint Mobility  5. Decreased [place] with Home Exercise Program INTERVENTIONS PLANNED:  1. Home Exercise Program (HEP)  2. Manual Therapy  3. Range of Motion (ROM)  4.  Therapeutic Exercise/Strengthening  5. modalities/taping as indicated TREATMENT PLAN:  Effective Dates: 5/31/17 TO 8/31/17. Frequency/Duration: 2 times a week for 12 weeks  GOALS: (Goals have been discussed and agreed upon with patient.)  - goals revised due to recent surgery  Short-Term Functional Goals: Time Frame: 2-4 weeks  1. Increase shoulder flexion PROM to >140 deg and ER >45 deg to allow full AROM shoulder - met  2. Decrease pain level by 2/10 for reaching and lifting ADL's - met  3. [place] with HEP with minimal cueing - met  4. Patient to be aware of post-op precautions with posture, positioning, and movt to protect repair. DISCHARGE GOALS: Time Frame: 12 weeks  1. Restore full ROM R shoulder for performance of all UE ADL's - prgressing  2. Patient to report minimal levels of pain with normal ADL's - progressing  3. Increase strength to at least 4/5 in all muscle groups for return to recreational activities - progressing  4. Improve score on DASH by > 8 points for ease with dressing and lifting ADL's - progressing  5. [place] with advanced HEP with no cueing - progressing  Rehabilitation Potential For Stated Goals: GOOD  Regarding Home Frank Sr.'s therapy, I certify that the treatment plan above will be carried out by a therapist or under their direction. Thank you for this referral,  Risa Chris PT     Referring Physician Signature: Kaylyn Pringle MD              Date                    The information in this section was collected on 4/17/17 (except where otherwise noted). HISTORY:   History of Present Injury/Illness (Reason for Referral):  Patient reports pain in R shoulder on and off for past couple of years, with recent worsening of symptoms in past couple of months. Also reports intermittent popping/clicking with certain movts. Pain mostly present with reaching overhead, lifting/carrying heavy objects. Sometimes it will ache for part of the day. Sometimes painful at night.  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 Works out 5 days/week doing weight lifting - has modified this recently due to shoulder pain (normally would do bench press, incline press, lat pull-downs, rows, front and lateral raises etc. Has stopped pull-ups due to pain and used lighter weights for other ex's. Also has R elbow pain with numbness in lateral fingers - cubital tunnel syndrome which he will have surgery for. States that he is waiting for return call from doctor regarding possibly having shoulder surgery at the same time if he is going to need it at later time anyway. Had cortisone shot 2 weeks ago with no relief of pain and possibly worsening of symptoms. 5/31/17: Patient had surgery 5/19 - has been in sling since then. Took pain meds a few days but no longer taking anything. Pain moderate - mostly bothersome at night. Still has some numbness in lateral 2 fingers. Past Medical History/Comorbidities:   Mr. Janneth Clancy  has a past medical history of Arthritis; GERD (gastroesophageal reflux disease); HTN (hypertension); and Nausea & vomiting. He also has no past medical history of Difficult intubation; Malignant hyperthermia due to anesthesia; or Pseudocholinesterase deficiency. Mr. Janneth Clancy  has a past surgical history that includes wisdom teeth extraction and other surgical.  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 (Patient taking differently: Take 20 mg by mouth daily. Indications: hypercholesterolemia), Disp: 90 Tab, Rfl: 3    lisinopril-hydroCHLOROthiazide (PRINZIDE, ZESTORETIC) 20-25 mg per tablet, Take 1 Tab by mouth daily. , Disp: 90 Tab, Rfl: 3    amLODIPine (NORVASC) 5 mg tablet, Take 1 Tab by mouth daily. (Patient taking differently: Take 5 mg by mouth daily. Indications: hypertension), Disp: 90 Tab, Rfl: 3   Date Last Reviewed:  7/7/2017     Number of Personal Factors/Comorbidities that affect the Plan of Care: 1-2: MODERATE COMPLEXITY   EXAMINATION:   Observation/Orthostatic Postural Assessment: Very muscular build with moderately rounded shoulders. R UE immobilised in sling with abduction pillow. Bandaging around elbow, forearm. Mild residual swelling. Palpation:    Moderate generalised tenderness s/ surgery  ROM:       LUE Assessment  LUE Assessment (WDL): Exceptions to WDL  LUE AROM  L Shoulder Flexion: 160  L Shoulder Extension: 35  L Shoulder Internal Rotation: 50  L Shoulder External Rotation: 95              RUE Assessment  RUE Assessment (WDL): Exception to WDL  RUE PROM  R Shoulder Flexion: 145   R shoulder abduction 140  R Shoulder Internal Rotation: 55  R Shoulder External Rotation: 70  R elbow ext -0        Strength:    LUE Strength, Tone, Sensation  LUE Strength, Tone, Sensation (WDL): Within defined limits        RUE Strength, Tone, Sensation  RUE Strength, Tone, Sensation (WDL): Exception to WDL  RUE Strength: 3+/5             Special Tests:   Neurological Screen:  WDL  (except for mild numbness 4th and 5th fingers due to cubital tunnel syndrome)  Functional Mobility:      limited with all UE ADL's  Balance: WDL   Body Structures Involved:  1. Nerves  2. Joints  3. Muscles  4. Ligaments Body Functions Affected:  1. Sensory/Pain  2. Neuromusculoskeletal  3. Movement Related Activities and Participation Affected:  1. General Tasks and Demands  2. Mobility  3. Self Care   Number of elements (examined above) that affect the Plan of Care: 3: MODERATE COMPLEXITY   CLINICAL PRESENTATION:   Presentation: Evolving clinical presentation with changing clinical characteristics: MODERATE COMPLEXITY   CLINICAL DECISION MAKING:   Outcome Measure:    Tool Used: Disabilities of the Arm, Shoulder and Hand (DASH) Questionnaire - Quick Version  Score:  Initial: 20/55  Most Recent: 22/55 (Date: 7/7/17 )   Interpretation of Score: The DASH is designed to measure the activities of daily living in person's with upper extremity dysfunction or pain. Each section is scored on a 1-5 scale, 5 representing the greatest disability. The scores of each section are added together for a total score of 55. Score 11 12-19 20-28 29-37 38-45 46-54 55   Modifier CH CI CJ CK CL CM CN       Medical Necessity:   · Patient is expected to demonstrate progress in strength, range of motion and functional technique to increase independence with all UE ADL's. · Skilled intervention continues to be required due to pain, decreased ROM & strength limiting UE ADL's. Reason for Services/Other Comments:  · Patient continues to continues to require modification of therapeutic interventions to increase complexity of exercises. · Patient continues to require skilled intervention due to pain, decreased ROM, and strength limiting UE ADL's. Use of outcome tool(s) and clinical judgement create a POC that gives a: Questionable prediction of patient's progress: MODERATE COMPLEXITY            TREATMENT:   (In addition to Assessment/Re-Assessment sessions the following treatments were rendered)  Pre-treatment Symptoms/Complaints: Patient did okay while away - shoulder a little sore but not bad  Pain: Initial:   Pain Intensity 1: 3  Post Session:  3     Therapeutic Exercise: (45 ):  Exercises as outlined below to improve mobility and strength. Required moderate verbal and tactile cues to promote proper body posture and promote proper body mechanics. Progressed resistance, range and repetitions as indicated.      UBE mod resistance, 5 mins  Active & resisted elbow flexion/ext  Biceps curls, 10#, 2 x 10  Resisted scap ex - elev/dep, ret/prot, modified PNF  Supine sh prot, 2 x 10, 10#  Supine sh flex  deg, green Tband, 2 x 10  Supine sh PNF patterns, green Tband. 2 x 10  Rhythmic stab IR/ER at 45 & 90 deg abd                        Flex/ext at 90 & 110 deg  Sidelying ER, 2 x 10 , 2#  Prone rows, 8#, 2 x 10  Prone ext, 4#, 2 x 10  Scap depression ex's - isometric into table, 10 reps  Sh IR, 35#, 2 x 10  Jeremiah Sh ext & rows, greenTband, 2 x 10  Sh ER, 15#, 2 x 10  Rows, 35#, 2 x 10  Fwd press, 25#, 2 x 10  sh flex, scaption, 2 x 10 ea - in mirror to minimise sh shrug, red Tband  Wall slides, bent elbow, 2 x 10  Sh press jeremiah, red Tband, 1 x 10  Wall circles  Boing   Manual Therapy (15 min): PROM/stretching for sh flexion, abduction, ER/IR. Gentle jt mobs. Isometrics flex, ext, abd, IR/ER. Passive stretching as edd to regain full elbow ROM, STM to biceps area and gentle scar tissue mob post elbow    HEP: As directed. Therapeutic Modalities: ice prn    Treatment/Session Assessment:    · Response to Treatment:  Patient tolerated treatment well - good steady progress continues. Able to tolerate flexion ROM with minimal pain with ROM to ~ 145 deg. Gradual improvement in IR/ER ROM with no muscle spasm - mild residual limitation in IR. ER WDL. Slightly abnormal scapulohumeral rhythm as yet but less apparent than a couple of weeks ago - encouraged to work in front of mirror at home. Will continue gradual progression of strengthening to restore functional use of R UE.   · Compliance with Program/Exercises: Compliant with HEP. · Recommendations/Intent for next treatment session: \"Next visit will focus on restoring full ROM and progressive strengthening over next several weeks within guidelines of rotator cuff rehabilitation protocol. FREQUENCY/DURATION: Follow patient 2 times a week for 12 weeks to address above goals, and upon reassessment will adjust frequency and duration as progress indicates.   Total Treatment Duration:  PT Patient Time In/Time Out  Time In: 1205  Time Out: 1305  No. Of Visits: 10/15  Juan Jose Lange PT,

## 2017-07-10 ENCOUNTER — HOSPITAL ENCOUNTER (OUTPATIENT)
Dept: PHYSICAL THERAPY | Age: 37
Discharge: HOME | End: 2017-07-10
Payer: COMMERCIAL

## 2017-07-10 PROCEDURE — 97140 MANUAL THERAPY 1/> REGIONS: CPT

## 2017-07-10 PROCEDURE — 97110 THERAPEUTIC EXERCISES: CPT

## 2017-07-10 NOTE — PROGRESS NOTES
Ansley Bryant  : 1980 [facility] [address] [facility]  [address], [address]  [phone]   [phone]         OUTPATIENT PHYSICAL THERAPY:Daily Note 7/10/2017    ICD-10: Treatment Diagnosis: Pain in right shoulder (M25.511)  Incomplete rotator cuff teaImpingement syndrome of right shoulder (M75.41)r or rupture of right shoulder, not specified as traumatic (M75.111)    Precautions/Allergies:   Penicillins and Sulfa (sulfonamide antibiotics)  Fall Risk Score: 1 (? 5 = High Risk)  MD Orders: Evaluate and treat MEDICAL/REFERRING DIAGNOSIS:  Impingement syndrome of right shoulder [M75.41]  Incomplete rotator cuff tear or rupture of right shoulder, not specified as traumatic [M75.111]   DATE OF ONSET: chronic  REFERRING PHYSICIAN: Aditya Rosa MD  RETURN PHYSICIAN APPOINTMENT: 17     ASSESSMENT:  Mr. Asmita Celeste is now about 6 weeks s/p R rotator cuff repair and has been seen in therapy for PROM gradually progressing to AROM and light strengthening exercises in accordance with rehab protocol. He is doing very well with close to full ROM except for some minor residual restrictions in overhead elevation and IR. He has slightly abnormal scapulohumeral rhythm as yet which should improve with improving scapula and rotator cuff strength Hecan continue to benefit from skilled therapy to fully restore ROM and progress with more aggressive strengthening to allow return of normal functtional use of R UE and modified recreational activities. PROBLEM LIST (Impacting functional limitations):  1. Decreased Strength  2. Decreased ADL/Functional Activities  3. Increased Pain  4. Decreased Flexibility/Joint Mobility  5. Decreased [place] with Home Exercise Program INTERVENTIONS PLANNED:  1. Home Exercise Program (HEP)  2. Manual Therapy  3. Range of Motion (ROM)  4.  Therapeutic Exercise/Strengthening  5. modalities/taping as indicated   TREATMENT PLAN:  Effective Dates: 5/31/17 TO 8/31/17. Frequency/Duration: 2 times a week for 12 weeks  GOALS: (Goals have been discussed and agreed upon with patient.)  - goals revised due to recent surgery  Short-Term Functional Goals: Time Frame: 2-4 weeks  1. Increase shoulder flexion PROM to >140 deg and ER >45 deg to allow full AROM shoulder - met  2. Decrease pain level by 2/10 for reaching and lifting ADL's - met  3. [place] with HEP with minimal cueing - met  4. Patient to be aware of post-op precautions with posture, positioning, and movt to protect repair. DISCHARGE GOALS: Time Frame: 12 weeks  1. Restore full ROM R shoulder for performance of all UE ADL's - prgressing  2. Patient to report minimal levels of pain with normal ADL's - progressing  3. Increase strength to at least 4/5 in all muscle groups for return to recreational activities - progressing  4. Improve score on DASH by > 8 points for ease with dressing and lifting ADL's - progressing  5. [place] with advanced HEP with no cueing - progressing  Rehabilitation Potential For Stated Goals: GOOD  Regarding Sheryl Rousseau Sr.'s therapy, I certify that the treatment plan above will be carried out by a therapist or under their direction. Thank you for this referral,  Sim Andrews PT     Referring Physician Signature: Debbie Walker MD              Date                    The information in this section was collected on 4/17/17 (except where otherwise noted). HISTORY:   History of Present Injury/Illness (Reason for Referral):  Patient reports pain in R shoulder on and off for past couple of years, with recent worsening of symptoms in past couple of months. Also reports intermittent popping/clicking with certain movts. Pain mostly present with reaching overhead, lifting/carrying heavy objects. Sometimes it will ache for part of the day. Sometimes painful at night.  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 Works out 5 days/week doing weight lifting - has modified this recently due to shoulder pain (normally would do bench press, incline press, lat pull-downs, rows, front and lateral raises etc. Has stopped pull-ups due to pain and used lighter weights for other ex's. Also has R elbow pain with numbness in lateral fingers - cubital tunnel syndrome which he will have surgery for. States that he is waiting for return call from doctor regarding possibly having shoulder surgery at the same time if he is going to need it at later time anyway. Had cortisone shot 2 weeks ago with no relief of pain and possibly worsening of symptoms. 5/31/17: Patient had surgery 5/19 - has been in sling since then. Took pain meds a few days but no longer taking anything. Pain moderate - mostly bothersome at night. Still has some numbness in lateral 2 fingers. Past Medical History/Comorbidities:   Mr. Jann Teixeira  has a past medical history of Arthritis; GERD (gastroesophageal reflux disease); HTN (hypertension); and Nausea & vomiting. He also has no past medical history of Difficult intubation; Malignant hyperthermia due to anesthesia; or Pseudocholinesterase deficiency. Mr. Jann Teixeira  has a past surgical history that includes wisdom teeth extraction and other surgical.  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 (Patient taking differently: Take 20 mg by mouth daily. Indications: hypercholesterolemia), Disp: 90 Tab, Rfl: 3    lisinopril-hydroCHLOROthiazide (PRINZIDE, ZESTORETIC) 20-25 mg per tablet, Take 1 Tab by mouth daily. , Disp: 90 Tab, Rfl: 3    amLODIPine (NORVASC) 5 mg tablet, Take 1 Tab by mouth daily. (Patient taking differently: Take 5 mg by mouth daily. Indications: hypertension), Disp: 90 Tab, Rfl: 3   Date Last Reviewed:  7/10/2017     Number of Personal Factors/Comorbidities that affect the Plan of Care: 1-2: MODERATE COMPLEXITY   EXAMINATION:   Observation/Orthostatic Postural Assessment: Very muscular build with moderately rounded shoulders. R UE immobilised in sling with abduction pillow. Bandaging around elbow, forearm. Mild residual swelling. Palpation:    Moderate generalised tenderness s/ surgery  ROM:       LUE Assessment  LUE Assessment (WDL): Exceptions to WDL  LUE AROM  L Shoulder Flexion: 160  L Shoulder Extension: 35  L Shoulder Internal Rotation: 50  L Shoulder External Rotation: 95              RUE Assessment  RUE Assessment (WDL): Exception to WDL  RUE PROM  R Shoulder Flexion: 145   R shoulder abduction 140  R Shoulder Internal Rotation: 55  R Shoulder External Rotation: 70  R elbow ext -0        Strength:    LUE Strength, Tone, Sensation  LUE Strength, Tone, Sensation (WDL): Within defined limits        RUE Strength, Tone, Sensation  RUE Strength, Tone, Sensation (WDL): Exception to WDL  RUE Strength: 3+/5             Special Tests:   Neurological Screen:  WDL  (except for mild numbness 4th and 5th fingers due to cubital tunnel syndrome)  Functional Mobility:      limited with all UE ADL's  Balance: WDL   Body Structures Involved:  1. Nerves  2. Joints  3. Muscles  4. Ligaments Body Functions Affected:  1. Sensory/Pain  2. Neuromusculoskeletal  3. Movement Related Activities and Participation Affected:  1. General Tasks and Demands  2. Mobility  3. Self Care   Number of elements (examined above) that affect the Plan of Care: 3: MODERATE COMPLEXITY   CLINICAL PRESENTATION:   Presentation: Evolving clinical presentation with changing clinical characteristics: MODERATE COMPLEXITY   CLINICAL DECISION MAKING:   Outcome Measure:    Tool Used: Disabilities of the Arm, Shoulder and Hand (DASH) Questionnaire - Quick Version  Score:  Initial: 20/55  Most Recent: 22/55 (Date: 7/7/17 )   Interpretation of Score: The DASH is designed to measure the activities of daily living in person's with upper extremity dysfunction or pain. Each section is scored on a 1-5 scale, 5 representing the greatest disability. The scores of each section are added together for a total score of 55. Score 11 12-19 20-28 29-37 38-45 46-54 55   Modifier CH CI CJ CK CL CM CN       Medical Necessity:   · Patient is expected to demonstrate progress in strength, range of motion and functional technique to increase independence with all UE ADL's. · Skilled intervention continues to be required due to pain, decreased ROM & strength limiting UE ADL's. Reason for Services/Other Comments:  · Patient continues to continues to require modification of therapeutic interventions to increase complexity of exercises. · Patient continues to require skilled intervention due to pain, decreased ROM, and strength limiting UE ADL's. Use of outcome tool(s) and clinical judgement create a POC that gives a: Questionable prediction of patient's progress: MODERATE COMPLEXITY            TREATMENT:   (In addition to Assessment/Re-Assessment sessions the following treatments were rendered)  Pre-treatment Symptoms/Complaints: States shoulder tends to shake when has to hold out in front of him. TOugh time stretching elbow fully by himself  Pain: Initial:   Pain Intensity 1: 3  Post Session:  3     Therapeutic Exercise: (45 ):  Exercises as outlined below to improve mobility and strength. Required moderate verbal and tactile cues to promote proper body posture and promote proper body mechanics. Progressed resistance, range and repetitions as indicated.      UBE mod resistance, 5 mins  Active & resisted elbow flexion/ext  Biceps curls, 10#, 2 x 10  Resisted scap ex - elev/dep, ret/prot, modified PNF  Supine sh prot, 2 x 10, 10#  Supine sh flex  deg, green Tband, 2 x 10  Supine sh PNF patterns, green Tband. 2 x 10  Rhythmic stab IR/ER at 45 & 90 deg abd                        Flex/ext at 90 & 110 deg  Sidelying ER, 2 x 10 , 2#  Prone rows, 8#, 2 x 10  Prone ext, 4#, 2 x 10  Scap depression ex's - isometric into table, 10 reps  Sh IR, 35#, 2 x 10  Jeremiah Sh ext & rows, greenTband, 2 x 10  Sh ER, 15#, 2 x 10  Rows, 35#, 2 x 10  Fwd press, 25#, 2 x 10  sh flex, scaption, 2 x 10 ea - in mirror to minimise sh shrug, red Tband  Wall slides, bent elbow, 2 x 10  Sh press jeremiah, red Tband, 1 x 10  Wall circles  Boing - 2 reps 30-40 sec  Manual Therapy (15 min): PROM/stretching for sh flexion, abduction, ER/IR. Gentle jt mobs. Isometrics flex, ext, abd, IR/ER. Passive stretching as edd to regain full elbow ROM, STM to biceps area and gentle scar tissue mob post elbow    HEP: As directed. Therapeutic Modalities: ice prn    Treatment/Session Assessment:    · Response to Treatment:  Patient tolerated treatment well - good steady progress continues. Better ROM with overhead flexion and IR ROM WDL today. Tends to be mildly tight with elbow ext at beginning of therapy - improves after STM and stretching. Slightly abnormal scapulohumeral rhythm as yet when uses light weight - okay with just active motion. Will continue gradual progression of strengthening to restore functional use of R UE.   · Compliance with Program/Exercises: Compliant with HEP. · Recommendations/Intent for next treatment session: \"Next visit will focus on restoring full ROM and progressive strengthening over next several weeks within guidelines of rotator cuff rehabilitation protocol. FREQUENCY/DURATION: Follow patient 2 times a week for 12 weeks to address above goals, and upon reassessment will adjust frequency and duration as progress indicates.   Total Treatment Duration:  PT Patient Time In/Time Out  Time In: 1200  Time Out: 1300  No. Of Visits: 11/16  Terence Babinski, PT,

## 2017-07-14 ENCOUNTER — HOSPITAL ENCOUNTER (OUTPATIENT)
Dept: PHYSICAL THERAPY | Age: 37
Discharge: HOME | End: 2017-07-14
Payer: COMMERCIAL

## 2017-07-14 PROCEDURE — 97110 THERAPEUTIC EXERCISES: CPT

## 2017-07-14 PROCEDURE — 97140 MANUAL THERAPY 1/> REGIONS: CPT

## 2017-07-14 NOTE — PROGRESS NOTES
Zuleima Florian  : 1980 [facility]  [address], [address]  [phone]   [phone]         OUTPATIENT PHYSICAL THERAPY:Daily Note 2017    ICD-10: Treatment Diagnosis: Pain in right shoulder (M25.511)  Incomplete rotator cuff teaImpingement syndrome of right shoulder (M75.41)r or rupture of right shoulder, not specified as traumatic (M75.111)    Precautions/Allergies:   Penicillins and Sulfa (sulfonamide antibiotics)  Fall Risk Score: 1 (? 5 = High Risk)  MD Orders: Evaluate and treat MEDICAL/REFERRING DIAGNOSIS:  Impingement syndrome of right shoulder [M75.41]  Incomplete rotator cuff tear or rupture of right shoulder, not specified as traumatic [M75.111]   DATE OF ONSET: chronic  REFERRING PHYSICIAN: Tosha Busch MD  RETURN PHYSICIAN APPOINTMENT: 17     ASSESSMENT:  Mr. Jerri Carrillo is now about 6 weeks s/p R rotator cuff repair and has been seen in therapy for PROM gradually progressing to AROM and light strengthening exercises in accordance with rehab protocol. He is doing very well with close to full ROM except for some minor residual restrictions in overhead elevation and IR. He has slightly abnormal scapulohumeral rhythm as yet which should improve with improving scapula and rotator cuff strength Hecan continue to benefit from skilled therapy to fully restore ROM and progress with more aggressive strengthening to allow return of normal functtional use of R UE and modified recreational activities. PROBLEM LIST (Impacting functional limitations):  1. Decreased Strength  2. Decreased ADL/Functional Activities  3. Increased Pain  4. Decreased Flexibility/Joint Mobility  5. Decreased [place] with Home Exercise Program INTERVENTIONS PLANNED:  1. Home Exercise Program (HEP)  2. Manual Therapy  3. Range of Motion (ROM)  4.  Therapeutic Exercise/Strengthening  5. modalities/taping as indicated   TREATMENT PLAN:  Effective Dates: 5/31/17 TO 8/31/17. Frequency/Duration: 2 times a week for 12 weeks  GOALS: (Goals have been discussed and agreed upon with patient.)  - goals revised due to recent surgery  Short-Term Functional Goals: Time Frame: 2-4 weeks  1. Increase shoulder flexion PROM to >140 deg and ER >45 deg to allow full AROM shoulder - met  2. Decrease pain level by 2/10 for reaching and lifting ADL's - met  3. [place] with HEP with minimal cueing - met  4. Patient to be aware of post-op precautions with posture, positioning, and movt to protect repair. DISCHARGE GOALS: Time Frame: 12 weeks  1. Restore full ROM R shoulder for performance of all UE ADL's - prgressing  2. Patient to report minimal levels of pain with normal ADL's - progressing  3. Increase strength to at least 4/5 in all muscle groups for return to recreational activities - progressing  4. Improve score on DASH by > 8 points for ease with dressing and lifting ADL's - progressing  5. [place] with advanced HEP with no cueing - progressing  Rehabilitation Potential For Stated Goals: GOOD  Regarding Home Frank Sr[facility]s therapy, I certify that the treatment plan above will be carried out by a therapist or under their direction. Thank you for this referral,  Risa Chris PT     Referring Physician Signature: Kaylyn Pringle MD              Date                    The information in this section was collected on 4/17/17 (except where otherwise noted). HISTORY:   History of Present Injury/Illness (Reason for Referral):  Patient reports pain in R shoulder on and off for past couple of years, with recent worsening of symptoms in past couple of months. Also reports intermittent popping/clicking with certain movts. Pain mostly present with reaching overhead, lifting/carrying heavy objects. Sometimes it will ache for part of the day. Sometimes painful at night.  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 Works out 5 days/week doing weight lifting - has modified this recently due to shoulder pain (normally would do bench press, incline press, lat pull-downs, rows, front and lateral raises etc. Has stopped pull-ups due to pain and used lighter weights for other ex's. Also has R elbow pain with numbness in lateral fingers - cubital tunnel syndrome which he will have surgery for. States that he is waiting for return call from doctor regarding possibly having shoulder surgery at the same time if he is going to need it at later time anyway. Had cortisone shot 2 weeks ago with no relief of pain and possibly worsening of symptoms. 5/31/17: Patient had surgery 5/19 - has been in sling since then. Took pain meds a few days but no longer taking anything. Pain moderate - mostly bothersome at night. Still has some numbness in lateral 2 fingers. Past Medical History/Comorbidities:   Mr. Neville Feliz  has a past medical history of Arthritis; GERD (gastroesophageal reflux disease); HTN (hypertension); and Nausea & vomiting. He also has no past medical history of Difficult intubation; Malignant hyperthermia due to anesthesia; or Pseudocholinesterase deficiency. Mr. Neville Feliz  has a past surgical history that includes wisdom teeth extraction and other surgical.  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 (Patient taking differently: Take 20 mg by mouth daily. Indications: hypercholesterolemia), Disp: 90 Tab, Rfl: 3    lisinopril-hydroCHLOROthiazide (PRINZIDE, ZESTORETIC) 20-25 mg per tablet, Take 1 Tab by mouth daily. , Disp: 90 Tab, Rfl: 3    amLODIPine (NORVASC) 5 mg tablet, Take 1 Tab by mouth daily. (Patient taking differently: Take 5 mg by mouth daily. Indications: hypertension), Disp: 90 Tab, Rfl: 3   Date Last Reviewed:  7/14/2017     Number of Personal Factors/Comorbidities that affect the Plan of Care: 1-2: MODERATE COMPLEXITY   EXAMINATION:   Observation/Orthostatic Postural Assessment: Very muscular build with moderately rounded shoulders. R UE immobilised in sling with abduction pillow. Bandaging around elbow, forearm. Mild residual swelling. Palpation:    Moderate generalised tenderness s/ surgery  ROM:       LUE Assessment  LUE Assessment (WDL): Exceptions to WDL  LUE AROM  L Shoulder Flexion: 160  L Shoulder Extension: 35  L Shoulder Internal Rotation: 50  L Shoulder External Rotation: 95              RUE Assessment  RUE Assessment (WDL): Exception to WDL  RUE PROM  R Shoulder Flexion: 145   R shoulder abduction 140  R Shoulder Internal Rotation: 55  R Shoulder External Rotation: 70  R elbow ext -0        Strength:    LUE Strength, Tone, Sensation  LUE Strength, Tone, Sensation (WDL): Within defined limits        RUE Strength, Tone, Sensation  RUE Strength, Tone, Sensation (WDL): Exception to WDL  RUE Strength: 3+/5             Special Tests:   Neurological Screen:  WDL  (except for mild numbness 4th and 5th fingers due to cubital tunnel syndrome)  Functional Mobility:      limited with all UE ADL's  Balance: WDL   Body Structures Involved:  1. Nerves  2. Joints  3. Muscles  4. Ligaments Body Functions Affected:  1. Sensory/Pain  2. Neuromusculoskeletal  3. Movement Related Activities and Participation Affected:  1. General Tasks and Demands  2. Mobility  3. Self Care   Number of elements (examined above) that affect the Plan of Care: 3: MODERATE COMPLEXITY   CLINICAL PRESENTATION:   Presentation: Evolving clinical presentation with changing clinical characteristics: MODERATE COMPLEXITY   CLINICAL DECISION MAKING:   Outcome Measure:    Tool Used: Disabilities of the Arm, Shoulder and Hand (DASH) Questionnaire - Quick Version  Score:  Initial: 20/55  Most Recent: 22/55 (Date: 7/7/17 )   Interpretation of Score: The DASH is designed to measure the activities of daily living in person's with upper extremity dysfunction or pain. Each section is scored on a 1-5 scale, 5 representing the greatest disability. The scores of each section are added together for a total score of 55. Score 11 12-19 20-28 29-37 38-45 46-54 55   Modifier CH CI CJ CK CL CM CN       Medical Necessity:   · Patient is expected to demonstrate progress in strength, range of motion and functional technique to increase independence with all UE ADL's. · Skilled intervention continues to be required due to pain, decreased ROM & strength limiting UE ADL's. Reason for Services/Other Comments:  · Patient continues to continues to require modification of therapeutic interventions to increase complexity of exercises. · Patient continues to require skilled intervention due to pain, decreased ROM, and strength limiting UE ADL's. Use of outcome tool(s) and clinical judgement create a POC that gives a: Questionable prediction of patient's progress: MODERATE COMPLEXITY            TREATMENT:   (In addition to Assessment/Re-Assessment sessions the following treatments were rendered)  Pre-treatment Symptoms/Complaints: States shoulder has been hurting more this week  Pain: Initial:   Pain Intensity 1: 4  Post Session:  3     Therapeutic Exercise: (30 ):  Exercises as outlined below to improve mobility and strength. Required moderate verbal and tactile cues to promote proper body posture and promote proper body mechanics. Progressed resistance, range and repetitions as indicated.      UBE mod resistance, 5 mins  Active & resisted elbow flexion/ext  Biceps curls, 10#, 2 x 10 - held  Resisted scap ex - elev/dep, ret/prot, modified PNF  Supine sh prot, 2 x 10, 10# - held  Supine sh flex  deg, green Tband, 2 x 10  Supine sh PNF patterns, green Tband. 2 x 10 - held  Rhythmic stab IR/ER at 45 & 90 deg abd                        Flex/ext at 90 & 110 deg  Sidelying ER, 2 x 10 , 2#  Prone rows, 8#, 2 x 10 - held  Prone ext, 4#, 2 x 10 - held  Scap depression ex's - isometric into table, 10 reps - held  Sh IR, 30#, 2 x 10  Jeremiah Sh ext & rows, greenTband, 2 x 10  Sh ER, 15#, 2 x 10  Rows, 30#, 2 x 10  Fwd press, 20#, 2 x 10  sh flex, scaption, 2 x 10 ea - in mirror to minimise sh shrug, red Tband  Wall slides, bent elbow, 2 x 10  Sh press jeremiah, red Tband, 1 x 10  Wall circles  Boing - 2 reps 30-40 sec  Manual Therapy (15 min): PROM/stretching for sh flexion, abduction, ER/IR. Jt mobs for pain reduction. Passive stretching as edd to regain full elbow ROM, STM to biceps area and scar tissue mob post elbow    HEP: As directed. Therapeutic Modalities: ice prn    Treatment/Session Assessment:    · Response to Treatment:  Patient tolerated treatment well - has had more pain this week so backed off on strengthening ex's today and will go a little easier on resistance until symptoms settle down. Better ROM with overhead flexion and IR ROM WDL today. Tends to be mildly tight with elbow ext at beginning of therapy - improves after STM and stretching. Slightly abnormal scapulohumeral rhythm as yet when uses light weight - okay with just active motion. Will continue gradual progression of strengthening to restore functional use of R UE.   · Compliance with Program/Exercises: Compliant with HEP. Recommendations/Intent for next treatment session: \"Next visit will focus on restoring full ROM and progressive strengthening as tolerated. FREQUENCY/DURATION: Follow patient 2 times a week for 12 weeks to address above goals, and upon reassessment will adjust frequency and duration as progress indicates.   Total Treatment Duration:  PT Patient Time In/Time Out  Time In: 1130  Time Out: 1215  No. Of Visits: 12/17  Risa Chris PT,

## 2017-07-17 ENCOUNTER — HOSPITAL ENCOUNTER (OUTPATIENT)
Dept: PHYSICAL THERAPY | Age: 37
Discharge: HOME | End: 2017-07-17
Payer: COMMERCIAL

## 2017-07-17 PROCEDURE — 97110 THERAPEUTIC EXERCISES: CPT

## 2017-07-17 PROCEDURE — 97140 MANUAL THERAPY 1/> REGIONS: CPT

## 2017-07-17 NOTE — PROGRESS NOTES
Iva Calderon  : 1980 [facility]  [address], [address]  [phone]   [phone]         OUTPATIENT PHYSICAL THERAPY:Daily Note 2017    ICD-10: Treatment Diagnosis: Pain in right shoulder (M25.511)  Incomplete rotator cuff teaImpingement syndrome of right shoulder (M75.41)r or rupture of right shoulder, not specified as traumatic (M75.111)    Precautions/Allergies:   Penicillins and Sulfa (sulfonamide antibiotics)  Fall Risk Score: 1 (? 5 = High Risk)  MD Orders: Evaluate and treat MEDICAL/REFERRING DIAGNOSIS:  Impingement syndrome of right shoulder [M75.41]  Incomplete rotator cuff tear or rupture of right shoulder, not specified as traumatic [M75.111]   DATE OF ONSET: chronic  REFERRING PHYSICIAN: Francisco Javier Valladares MD  RETURN PHYSICIAN APPOINTMENT: 17     ASSESSMENT:  Mr. Michelle Aquino is now about 6 weeks s/p R rotator cuff repair and has been seen in therapy for PROM gradually progressing to AROM and light strengthening exercises in accordance with rehab protocol. He is doing very well with close to full ROM except for some minor residual restrictions in overhead elevation and IR. He has slightly abnormal scapulohumeral rhythm as yet which should improve with improving scapula and rotator cuff strength Hecan continue to benefit from skilled therapy to fully restore ROM and progress with more aggressive strengthening to allow return of normal functtional use of R UE and modified recreational activities. PROBLEM LIST (Impacting functional limitations):  1. Decreased Strength  2. Decreased ADL/Functional Activities  3. Increased Pain  4. Decreased Flexibility/Joint Mobility  5. Decreased [place] with Home Exercise Program INTERVENTIONS PLANNED:  1. Home Exercise Program (HEP)  2. Manual Therapy  3. Range of Motion (ROM)  4.  Therapeutic Exercise/Strengthening  5. modalities/taping as indicated   TREATMENT PLAN:  Effective Dates: 5/31/17 TO 8/31/17. Frequency/Duration: 2 times a week for 12 weeks  GOALS: (Goals have been discussed and agreed upon with patient.)  - goals revised due to recent surgery  Short-Term Functional Goals: Time Frame: 2-4 weeks  1. Increase shoulder flexion PROM to >140 deg and ER >45 deg to allow full AROM shoulder - met  2. Decrease pain level by 2/10 for reaching and lifting ADL's - met  3. [place] with HEP with minimal cueing - met  4. Patient to be aware of post-op precautions with posture, positioning, and movt to protect repair. DISCHARGE GOALS: Time Frame: 12 weeks  1. Restore full ROM R shoulder for performance of all UE ADL's - prgressing  2. Patient to report minimal levels of pain with normal ADL's - progressing  3. Increase strength to at least 4/5 in all muscle groups for return to recreational activities - progressing  4. Improve score on DASH by > 8 points for ease with dressing and lifting ADL's - progressing  5. [place] with advanced HEP with no cueing - progressing  Rehabilitation Potential For Stated Goals: GOOD  Regarding Nora Fuentes Sr.'s therapy, I certify that the treatment plan above will be carried out by a therapist or under their direction. Thank you for this referral,  Florence White PT     Referring Physician Signature: Aditya Rosa MD              Date                    The information in this section was collected on 4/17/17 (except where otherwise noted). HISTORY:   History of Present Injury/Illness (Reason for Referral):  Patient reports pain in R shoulder on and off for past couple of years, with recent worsening of symptoms in past couple of months. Also reports intermittent popping/clicking with certain movts. Pain mostly present with reaching overhead, lifting/carrying heavy objects. Sometimes it will ache for part of the day. Sometimes painful at night.  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 Works out 5 days/week doing weight lifting - has modified this recently due to shoulder pain (normally would do bench press, incline press, lat pull-downs, rows, front and lateral raises etc. Has stopped pull-ups due to pain and used lighter weights for other ex's. Also has R elbow pain with numbness in lateral fingers - cubital tunnel syndrome which he will have surgery for. States that he is waiting for return call from doctor regarding possibly having shoulder surgery at the same time if he is going to need it at later time anyway. Had cortisone shot 2 weeks ago with no relief of pain and possibly worsening of symptoms. 5/31/17: Patient had surgery 5/19 - has been in sling since then. Took pain meds a few days but no longer taking anything. Pain moderate - mostly bothersome at night. Still has some numbness in lateral 2 fingers. Past Medical History/Comorbidities:   Mr. Toya Luther  has a past medical history of Arthritis; GERD (gastroesophageal reflux disease); HTN (hypertension); and Nausea & vomiting. He also has no past medical history of Difficult intubation; Malignant hyperthermia due to anesthesia; or Pseudocholinesterase deficiency. Mr. Toya Luther  has a past surgical history that includes wisdom teeth extraction and other surgical.  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 (Patient taking differently: Take 20 mg by mouth daily. Indications: hypercholesterolemia), Disp: 90 Tab, Rfl: 3    lisinopril-hydroCHLOROthiazide (PRINZIDE, ZESTORETIC) 20-25 mg per tablet, Take 1 Tab by mouth daily. , Disp: 90 Tab, Rfl: 3    amLODIPine (NORVASC) 5 mg tablet, Take 1 Tab by mouth daily. (Patient taking differently: Take 5 mg by mouth daily. Indications: hypertension), Disp: 90 Tab, Rfl: 3   Date Last Reviewed:  7/17/2017     Number of Personal Factors/Comorbidities that affect the Plan of Care: 1-2: MODERATE COMPLEXITY   EXAMINATION:   Observation/Orthostatic Postural Assessment: Very muscular build with moderately rounded shoulders. R UE immobilised in sling with abduction pillow. Bandaging around elbow, forearm. Mild residual swelling. Palpation:    Moderate generalised tenderness s/ surgery  ROM:       LUE Assessment  LUE Assessment (WDL): Exceptions to WDL  LUE AROM  L Shoulder Flexion: 160  L Shoulder Extension: 35  L Shoulder Internal Rotation: 50  L Shoulder External Rotation: 95              RUE Assessment  RUE Assessment (WDL): Exception to WDL  RUE PROM  R Shoulder Flexion: 145   R shoulder abduction 140  R Shoulder Internal Rotation: 55  R Shoulder External Rotation: 70  R elbow ext -0        Strength:    LUE Strength, Tone, Sensation  LUE Strength, Tone, Sensation (WDL): Within defined limits        RUE Strength, Tone, Sensation  RUE Strength, Tone, Sensation (WDL): Exception to WDL  RUE Strength: 3+/5             Special Tests:   Neurological Screen:  WDL  (except for mild numbness 4th and 5th fingers due to cubital tunnel syndrome)  Functional Mobility:      limited with all UE ADL's  Balance: WDL   Body Structures Involved:  1. Nerves  2. Joints  3. Muscles  4. Ligaments Body Functions Affected:  1. Sensory/Pain  2. Neuromusculoskeletal  3. Movement Related Activities and Participation Affected:  1. General Tasks and Demands  2. Mobility  3. Self Care   Number of elements (examined above) that affect the Plan of Care: 3: MODERATE COMPLEXITY   CLINICAL PRESENTATION:   Presentation: Evolving clinical presentation with changing clinical characteristics: MODERATE COMPLEXITY   CLINICAL DECISION MAKING:   Outcome Measure:    Tool Used: Disabilities of the Arm, Shoulder and Hand (DASH) Questionnaire - Quick Version  Score:  Initial: 20/55  Most Recent: 22/55 (Date: 7/7/17 )   Interpretation of Score: The DASH is designed to measure the activities of daily living in person's with upper extremity dysfunction or pain. Each section is scored on a 1-5 scale, 5 representing the greatest disability. The scores of each section are added together for a total score of 55. Score 11 12-19 20-28 29-37 38-45 46-54 55   Modifier CH CI CJ CK CL CM CN       Medical Necessity:   · Patient is expected to demonstrate progress in strength, range of motion and functional technique to increase independence with all UE ADL's. · Skilled intervention continues to be required due to pain, decreased ROM & strength limiting UE ADL's. Reason for Services/Other Comments:  · Patient continues to continues to require modification of therapeutic interventions to increase complexity of exercises. · Patient continues to require skilled intervention due to pain, decreased ROM, and strength limiting UE ADL's. Use of outcome tool(s) and clinical judgement create a POC that gives a: Questionable prediction of patient's progress: MODERATE COMPLEXITY            TREATMENT:   (In addition to Assessment/Re-Assessment sessions the following treatments were rendered)  Pre-treatment Symptoms/Complaints: States shoulder wasn't as bad over the weekend   Pain: Initial:   Pain Intensity 1: 3  Post Session:  3     Therapeutic Exercise: (40 ):  Exercises as outlined below to improve mobility and strength. Required moderate verbal and tactile cues to promote proper body posture and promote proper body mechanics. Progressed resistance, range and repetitions as indicated.      UBE mod resistance, 8 mins  Active & resisted elbow flexion/ext  Biceps curls, 10#, 2 x 10 - held  Resisted scap ex - elev/dep, ret/prot, modified PNF  Supine sh prot, 2 x 10, 10#   Supine sh flex  deg, green Tband, 2 x 10  Supine sh PNF patterns, green Tband. 2 x 10 - held  Rhythmic stab IR/ER at 45 & 90 deg abd                        Flex/ext at 90 & 110 deg  Sidelying ER, 2 x 10 , 2#  Prone rows, 6#, 2 x 10 -  Prone ext, 3#, 2 x 10  Scap depression ex's - isometric into table, 10 reps   Sh IR, 30#, 2 x 10  Jeremiah Sh ext & rows, greenTband, 2 x 10  Sh ER, 15#, 2 x 10  Rows, 30#, 2 x 10  Fwd press, 20#, 2 x 10  sh flex, scaption, 2 x 10 ea - in mirror to minimise sh shrug, red Tband  Wall slides, bent elbow, 2 x 10  Sh press jeremiah, red Tband, 1 x 10  Wall circles  Boing - 2 reps 30-40 sec  Manual Therapy (20 min): PROM/stretching for sh flexion, abduction, ER/IR. Jt mobs for pain reduction. Passive stretching as edd to regain full elbow ROM, STM to biceps area and scar tissue mob post elbow    HEP: As directed. Therapeutic Modalities: ice prn    Treatment/Session Assessment:    · Response to Treatment:  Patient tolerated treatment well - pain not as bad over the weekend but will continue to go a little easier on resistance until symptoms settle down. Resumed some of the exercises today with lesser weight. Still has tendency to be mildly tight with elbow ext at beginning of therapy - improves after STM and stretching. Slightly abnormal scapulohumeral rhythm as yet when uses light weight - okay with just active motion. Will continue gradual progression of strengthening to restore functional use of R UE. Discussed progression of ex's once discharged from therapy as patient a little frustrated with how much strength he has lost and hopes eventually to return to weight training though aware that he won't be able to do it as he did before injury. · Compliance with Program/Exercises: Compliant with HEP. Recommendations/Intent for next treatment session: \"Next visit will focus on restoring full ROM and progressive strengthening as tolerated.   FREQUENCY/DURATION: Follow patient 2 times a week for 12 weeks to address above goals, and upon reassessment will adjust frequency and duration as progress indicates.   Total Treatment Duration:  PT Patient Time In/Time Out  Time In: 1210  Time Out: 1310  No. Of Visits: 13/18  Honey Spears, PT,

## 2017-07-21 ENCOUNTER — HOSPITAL ENCOUNTER (OUTPATIENT)
Dept: PHYSICAL THERAPY | Age: 37
Discharge: HOME | End: 2017-07-21
Payer: COMMERCIAL

## 2017-07-21 PROCEDURE — 97140 MANUAL THERAPY 1/> REGIONS: CPT

## 2017-07-21 PROCEDURE — 97110 THERAPEUTIC EXERCISES: CPT

## 2017-07-21 NOTE — PROGRESS NOTES
Iva Calderon  : 1980 [facility]  [address], [address]  [phone]   [phone]         OUTPATIENT PHYSICAL THERAPY:Daily Note 2017    ICD-10: Treatment Diagnosis: Pain in right shoulder (M25.511)  Incomplete rotator cuff teaImpingement syndrome of right shoulder (M75.41)r or rupture of right shoulder, not specified as traumatic (M75.111)    Precautions/Allergies:   Penicillins and Sulfa (sulfonamide antibiotics)  Fall Risk Score: 1 (? 5 = High Risk)  MD Orders: Evaluate and treat MEDICAL/REFERRING DIAGNOSIS:  Impingement syndrome of right shoulder [M75.41]  Incomplete rotator cuff tear or rupture of right shoulder, not specified as traumatic [M75.111]   DATE OF ONSET: chronic  REFERRING PHYSICIAN: Francisco Javier Valladares MD  RETURN PHYSICIAN APPOINTMENT: 17     ASSESSMENT:  Mr. Michelle Aquino is now about 6 weeks s/p R rotator cuff repair and has been seen in therapy for PROM gradually progressing to AROM and light strengthening exercises in accordance with rehab protocol. He is doing very well with close to full ROM except for some minor residual restrictions in overhead elevation and IR. He has slightly abnormal scapulohumeral rhythm as yet which should improve with improving scapula and rotator cuff strength Hecan continue to benefit from skilled therapy to fully restore ROM and progress with more aggressive strengthening to allow return of normal functtional use of R UE and modified recreational activities. PROBLEM LIST (Impacting functional limitations):  1. Decreased Strength  2. Decreased ADL/Functional Activities  3. Increased Pain  4. Decreased Flexibility/Joint Mobility  5. Decreased [place] with Home Exercise Program INTERVENTIONS PLANNED:  1. Home Exercise Program (HEP)  2. Manual Therapy  3. Range of Motion (ROM)  4.  Therapeutic Exercise/Strengthening  5. modalities/taping as indicated   TREATMENT PLAN:  Effective Dates: 5/31/17 TO 8/31/17. Frequency/Duration: 2 times a week for 12 weeks  GOALS: (Goals have been discussed and agreed upon with patient.)  - goals revised due to recent surgery  Short-Term Functional Goals: Time Frame: 2-4 weeks  1. Increase shoulder flexion PROM to >140 deg and ER >45 deg to allow full AROM shoulder - met  2. Decrease pain level by 2/10 for reaching and lifting ADL's - met  3. [place] with HEP with minimal cueing - met  4. Patient to be aware of post-op precautions with posture, positioning, and movt to protect repair. DISCHARGE GOALS: Time Frame: 12 weeks  1. Restore full ROM R shoulder for performance of all UE ADL's - prgressing  2. Patient to report minimal levels of pain with normal ADL's - progressing  3. Increase strength to at least 4/5 in all muscle groups for return to recreational activities - progressing  4. Improve score on DASH by > 8 points for ease with dressing and lifting ADL's - progressing  5. [place] with advanced HEP with no cueing - progressing  Rehabilitation Potential For Stated Goals: GOOD  Regarding Alicia Lo Sr.'s therapy, I certify that the treatment plan above will be carried out by a therapist or under their direction. Thank you for this referral,  Nii Patrick PT     Referring Physician Signature: Leim Ormond, MD              Date                    The information in this section was collected on 4/17/17 (except where otherwise noted). HISTORY:   History of Present Injury/Illness (Reason for Referral):  Patient reports pain in R shoulder on and off for past couple of years, with recent worsening of symptoms in past couple of months. Also reports intermittent popping/clicking with certain movts. Pain mostly present with reaching overhead, lifting/carrying heavy objects. Sometimes it will ache for part of the day. Sometimes painful at night.  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 Works out 5 days/week doing weight lifting - has modified this recently due to shoulder pain (normally would do bench press, incline press, lat pull-downs, rows, front and lateral raises etc. Has stopped pull-ups due to pain and used lighter weights for other ex's. Also has R elbow pain with numbness in lateral fingers - cubital tunnel syndrome which he will have surgery for. States that he is waiting for return call from doctor regarding possibly having shoulder surgery at the same time if he is going to need it at later time anyway. Had cortisone shot 2 weeks ago with no relief of pain and possibly worsening of symptoms. 5/31/17: Patient had surgery 5/19 - has been in sling since then. Took pain meds a few days but no longer taking anything. Pain moderate - mostly bothersome at night. Still has some numbness in lateral 2 fingers. Past Medical History/Comorbidities:   Mr. Asmita Ceelste  has a past medical history of Arthritis; GERD (gastroesophageal reflux disease); HTN (hypertension); and Nausea & vomiting. He also has no past medical history of Difficult intubation; Malignant hyperthermia due to anesthesia; or Pseudocholinesterase deficiency. Mr. Asmita Celeste  has a past surgical history that includes wisdom teeth extraction and other surgical.  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 (Patient taking differently: Take 20 mg by mouth daily. Indications: hypercholesterolemia), Disp: 90 Tab, Rfl: 3    lisinopril-hydroCHLOROthiazide (PRINZIDE, ZESTORETIC) 20-25 mg per tablet, Take 1 Tab by mouth daily. , Disp: 90 Tab, Rfl: 3    amLODIPine (NORVASC) 5 mg tablet, Take 1 Tab by mouth daily. (Patient taking differently: Take 5 mg by mouth daily. Indications: hypertension), Disp: 90 Tab, Rfl: 3   Date Last Reviewed:  7/21/2017     Number of Personal Factors/Comorbidities that affect the Plan of Care: 1-2: MODERATE COMPLEXITY   EXAMINATION:   Observation/Orthostatic Postural Assessment: Very muscular build with moderately rounded shoulders. R UE immobilised in sling with abduction pillow. Bandaging around elbow, forearm. Mild residual swelling. Palpation:    Moderate generalised tenderness s/ surgery  ROM:       LUE Assessment  LUE Assessment (WDL): Exceptions to WDL  LUE AROM  L Shoulder Flexion: 160  L Shoulder Extension: 35  L Shoulder Internal Rotation: 50  L Shoulder External Rotation: 95              RUE Assessment  RUE Assessment (WDL): Exception to WDL  RUE PROM  R Shoulder Flexion: 145   R shoulder abduction 140  R Shoulder Internal Rotation: 55  R Shoulder External Rotation: 70  R elbow ext -0        Strength:    LUE Strength, Tone, Sensation  LUE Strength, Tone, Sensation (WDL): Within defined limits        RUE Strength, Tone, Sensation  RUE Strength, Tone, Sensation (WDL): Exception to WDL  RUE Strength: 3+/5             Special Tests:   Neurological Screen:  WDL  (except for mild numbness 4th and 5th fingers due to cubital tunnel syndrome)  Functional Mobility:      limited with all UE ADL's  Balance: WDL   Body Structures Involved:  1. Nerves  2. Joints  3. Muscles  4. Ligaments Body Functions Affected:  1. Sensory/Pain  2. Neuromusculoskeletal  3. Movement Related Activities and Participation Affected:  1. General Tasks and Demands  2. Mobility  3. Self Care   Number of elements (examined above) that affect the Plan of Care: 3: MODERATE COMPLEXITY   CLINICAL PRESENTATION:   Presentation: Evolving clinical presentation with changing clinical characteristics: MODERATE COMPLEXITY   CLINICAL DECISION MAKING:   Outcome Measure:    Tool Used: Disabilities of the Arm, Shoulder and Hand (DASH) Questionnaire - Quick Version  Score:  Initial: 20/55  Most Recent: 22/55 (Date: 7/7/17 )   Interpretation of Score: The DASH is designed to measure the activities of daily living in person's with upper extremity dysfunction or pain. Each section is scored on a 1-5 scale, 5 representing the greatest disability. The scores of each section are added together for a total score of 55. Score 11 12-19 20-28 29-37 38-45 46-54 55   Modifier CH CI CJ CK CL CM CN       Medical Necessity:   · Patient is expected to demonstrate progress in strength, range of motion and functional technique to increase independence with all UE ADL's. · Skilled intervention continues to be required due to pain, decreased ROM & strength limiting UE ADL's. Reason for Services/Other Comments:  · Patient continues to continues to require modification of therapeutic interventions to increase complexity of exercises. · Patient continues to require skilled intervention due to pain, decreased ROM, and strength limiting UE ADL's. Use of outcome tool(s) and clinical judgement create a POC that gives a: Questionable prediction of patient's progress: MODERATE COMPLEXITY            TREATMENT:   (In addition to Assessment/Re-Assessment sessions the following treatments were rendered)  Pre-treatment Symptoms/Complaints: States shoulder has been better this week - not as painful. Pain: Initial:   Pain Intensity 1: 3  Post Session:  3     Therapeutic Exercise: (40 ):  Exercises as outlined below to improve mobility and strength. Required moderate verbal and tactile cues to promote proper body posture and promote proper body mechanics. Progressed resistance, range and repetitions as indicated.      UBE mod resistance, 8 mins  Active & resisted elbow flexion/ext  Biceps curls, 10#, 2 x 10 - held  Resisted scap ex - elev/dep, ret/prot, modified PNF  Supine sh prot, 2 x 10, 10#   Supine sh flex  deg, green Tband, 2 x 10  Supine sh PNF patterns, green Tband. 2 x 10 - held  Rhythmic stab IR/ER at 45 & 90 deg abd                        Flex/ext at 90 & 110 deg  Sidelying ER, 2 x 10 , 2#  Prone rows, 6#, 2 x 10 -  Prone ext, 3#, 2 x 10  Scap depression ex's - isometric into table, 10 reps   Sh IR, 30#, 2 x 10  Jeremiah Sh ext & rows, greenTband, 2 x 10  Sh ER, 15#, 2 x 10  Rows, 30#, 2 x 10  Fwd press, 20#, 2 x 10  sh flex, scaption, 2 x 10 ea - in mirror to minimise sh shrug, red Tband  Wall slides, bent elbow, 2 x 10  Sh press jeremiah, red Tband, 1 x 10  Wall circles  Boing - 2 reps 30-40 sec  Manual Therapy (20 min): PROM/stretching for sh flexion, abduction, ER/IR. Jt mobs for pain reduction. Passive stretching as edd to regain full elbow ROM, STM to biceps area and scar tissue mob post elbow    HEP: As directed. Therapeutic Modalities: ice prn    Treatment/Session Assessment:    · Response to Treatment:  Patient tolerated treatment well - pain seems to have settled down so will plan on progressing resistance/reps next week if still feeling okay. Mild discomfort end rg IR at post asp shoulder and occasional pain around [facility] jt. . Slightly abnormal scapulohumeral rhythm as yet when uses light weight - okay with just active motion. Will continue gradual progression of strengthening to restore functional use of R UE. Patient feeling better about progress and open to later modification of weight training. · Compliance with Program/Exercises: Compliant with HEP. Recommendations/Intent for next treatment session: \"Next visit will focus on restoring full ROM and progressive strengthening as tolerated. FREQUENCY/DURATION: Follow patient 2 times a week for 12 weeks to address above goals, and upon reassessment will adjust frequency and duration as progress indicates.   Total Treatment Duration:  PT Patient Time In/Time Out  Time In: 1205  Time Out: 1305  No. Of Visits: 14/19  Ronan Dumont, PT,

## 2017-07-24 ENCOUNTER — HOSPITAL ENCOUNTER (OUTPATIENT)
Dept: PHYSICAL THERAPY | Age: 37
Discharge: HOME | End: 2017-07-24
Payer: COMMERCIAL

## 2017-07-24 PROCEDURE — 97140 MANUAL THERAPY 1/> REGIONS: CPT

## 2017-07-24 PROCEDURE — 97110 THERAPEUTIC EXERCISES: CPT

## 2017-07-24 NOTE — PROGRESS NOTES
Nahomy Plascencia  : 1980 [facility]  [address], [address]  [phone]   [phone]         OUTPATIENT PHYSICAL THERAPY:Daily Note 2017    ICD-10: Treatment Diagnosis: Pain in right shoulder (M25.511)  Incomplete rotator cuff teaImpingement syndrome of right shoulder (M75.41)r or rupture of right shoulder, not specified as traumatic (M75.111)    Precautions/Allergies:   Penicillins and Sulfa (sulfonamide antibiotics)  Fall Risk Score: 1 (? 5 = High Risk)  MD Orders: Evaluate and treat MEDICAL/REFERRING DIAGNOSIS:  Impingement syndrome of right shoulder [M75.41]  Incomplete rotator cuff tear or rupture of right shoulder, not specified as traumatic [M75.111]   DATE OF ONSET: chronic  REFERRING PHYSICIAN: Brandy Mi MD  RETURN PHYSICIAN APPOINTMENT: 17     ASSESSMENT:  Mr. Lucille Polanco is now about 6 weeks s/p R rotator cuff repair and has been seen in therapy for PROM gradually progressing to AROM and light strengthening exercises in accordance with rehab protocol. He is doing very well with close to full ROM except for some minor residual restrictions in overhead elevation and IR. He has slightly abnormal scapulohumeral rhythm as yet which should improve with improving scapula and rotator cuff strength Hecan continue to benefit from skilled therapy to fully restore ROM and progress with more aggressive strengthening to allow return of normal functtional use of R UE and modified recreational activities. PROBLEM LIST (Impacting functional limitations):  1. Decreased Strength  2. Decreased ADL/Functional Activities  3. Increased Pain  4. Decreased Flexibility/Joint Mobility  5. Decreased [place] with Home Exercise Program INTERVENTIONS PLANNED:  1. Home Exercise Program (HEP)  2. Manual Therapy  3. Range of Motion (ROM)  4.  Therapeutic Exercise/Strengthening  5. modalities/taping as indicated   TREATMENT PLAN:  Effective Dates: 5/31/17 TO 8/31/17. Frequency/Duration: 2 times a week for 12 weeks  GOALS: (Goals have been discussed and agreed upon with patient.)  - goals revised due to recent surgery  Short-Term Functional Goals: Time Frame: 2-4 weeks  1. Increase shoulder flexion PROM to >140 deg and ER >45 deg to allow full AROM shoulder - met  2. Decrease pain level by 2/10 for reaching and lifting ADL's - met  3. [place] with HEP with minimal cueing - met  4. Patient to be aware of post-op precautions with posture, positioning, and movt to protect repair. DISCHARGE GOALS: Time Frame: 12 weeks  1. Restore full ROM R shoulder for performance of all UE ADL's - prgressing  2. Patient to report minimal levels of pain with normal ADL's - progressing  3. Increase strength to at least 4/5 in all muscle groups for return to recreational activities - progressing  4. Improve score on DASH by > 8 points for ease with dressing and lifting ADL's - progressing  5. [place] with advanced HEP with no cueing - progressing  Rehabilitation Potential For Stated Goals: GOOD  Regarding Manjinder Hanson Sr.'s therapy, I certify that the treatment plan above will be carried out by a therapist or under their direction. Thank you for this referral,  Lloyd Murray PT     Referring Physician Signature: Magali Higginbotham MD              Date                    The information in this section was collected on 4/17/17 (except where otherwise noted). HISTORY:   History of Present Injury/Illness (Reason for Referral):  Patient reports pain in R shoulder on and off for past couple of years, with recent worsening of symptoms in past couple of months. Also reports intermittent popping/clicking with certain movts. Pain mostly present with reaching overhead, lifting/carrying heavy objects. Sometimes it will ache for part of the day. Sometimes painful at night.  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 Works out 5 days/week doing weight lifting - has modified this recently due to shoulder pain (normally would do bench press, incline press, lat pull-downs, rows, front and lateral raises etc. Has stopped pull-ups due to pain and used lighter weights for other ex's. Also has R elbow pain with numbness in lateral fingers - cubital tunnel syndrome which he will have surgery for. States that he is waiting for return call from doctor regarding possibly having shoulder surgery at the same time if he is going to need it at later time anyway. Had cortisone shot 2 weeks ago with no relief of pain and possibly worsening of symptoms. 5/31/17: Patient had surgery 5/19 - has been in sling since then. Took pain meds a few days but no longer taking anything. Pain moderate - mostly bothersome at night. Still has some numbness in lateral 2 fingers. Past Medical History/Comorbidities:   Mr. Seth Green  has a past medical history of Arthritis; GERD (gastroesophageal reflux disease); HTN (hypertension); and Nausea & vomiting. He also has no past medical history of Difficult intubation; Malignant hyperthermia due to anesthesia; or Pseudocholinesterase deficiency. Mr. Seth Green  has a past surgical history that includes wisdom teeth extraction and other surgical.  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 (Patient taking differently: Take 20 mg by mouth daily. Indications: hypercholesterolemia), Disp: 90 Tab, Rfl: 3    lisinopril-hydroCHLOROthiazide (PRINZIDE, ZESTORETIC) 20-25 mg per tablet, Take 1 Tab by mouth daily. , Disp: 90 Tab, Rfl: 3    amLODIPine (NORVASC) 5 mg tablet, Take 1 Tab by mouth daily. (Patient taking differently: Take 5 mg by mouth daily. Indications: hypertension), Disp: 90 Tab, Rfl: 3   Date Last Reviewed:  7/24/2017     Number of Personal Factors/Comorbidities that affect the Plan of Care: 1-2: MODERATE COMPLEXITY   EXAMINATION:   Observation/Orthostatic Postural Assessment: Very muscular build with moderately rounded shoulders. R UE immobilised in sling with abduction pillow. Bandaging around elbow, forearm. Mild residual swelling. Palpation:    Moderate generalised tenderness s/ surgery  ROM:       LUE Assessment  LUE Assessment (WDL): Exceptions to WDL  LUE AROM  L Shoulder Flexion: 160  L Shoulder Extension: 35  L Shoulder Internal Rotation: 50  L Shoulder External Rotation: 95              RUE Assessment  RUE Assessment (WDL): Exception to WDL  RUE PROM  R Shoulder Flexion: 145   R shoulder abduction 140  R Shoulder Internal Rotation: 55  R Shoulder External Rotation: 70  R elbow ext -0        Strength:    LUE Strength, Tone, Sensation  LUE Strength, Tone, Sensation (WDL): Within defined limits        RUE Strength, Tone, Sensation  RUE Strength, Tone, Sensation (WDL): Exception to WDL  RUE Strength: 3+/5             Special Tests:   Neurological Screen:  WDL  (except for mild numbness 4th and 5th fingers due to cubital tunnel syndrome)  Functional Mobility:      limited with all UE ADL's  Balance: WDL   Body Structures Involved:  1. Nerves  2. Joints  3. Muscles  4. Ligaments Body Functions Affected:  1. Sensory/Pain  2. Neuromusculoskeletal  3. Movement Related Activities and Participation Affected:  1. General Tasks and Demands  2. Mobility  3. Self Care   Number of elements (examined above) that affect the Plan of Care: 3: MODERATE COMPLEXITY   CLINICAL PRESENTATION:   Presentation: Evolving clinical presentation with changing clinical characteristics: MODERATE COMPLEXITY   CLINICAL DECISION MAKING:   Outcome Measure:    Tool Used: Disabilities of the Arm, Shoulder and Hand (DASH) Questionnaire - Quick Version  Score:  Initial: 20/55  Most Recent: 22/55 (Date: 7/7/17 )   Interpretation of Score: The DASH is designed to measure the activities of daily living in person's with upper extremity dysfunction or pain. Each section is scored on a 1-5 scale, 5 representing the greatest disability. The scores of each section are added together for a total score of 55. Score 11 12-19 20-28 29-37 38-45 46-54 55   Modifier CH CI CJ CK CL CM CN       Medical Necessity:   · Patient is expected to demonstrate progress in strength, range of motion and functional technique to increase independence with all UE ADL's. · Skilled intervention continues to be required due to pain, decreased ROM & strength limiting UE ADL's. Reason for Services/Other Comments:  · Patient continues to continues to require modification of therapeutic interventions to increase complexity of exercises. · Patient continues to require skilled intervention due to pain, decreased ROM, and strength limiting UE ADL's. Use of outcome tool(s) and clinical judgement create a POC that gives a: Questionable prediction of patient's progress: MODERATE COMPLEXITY            TREATMENT:   (In addition to Assessment/Re-Assessment sessions the following treatments were rendered)  Pre-treatment Symptoms/Complaints: States shoulder felt pretty good over the weekend  Pain: Initial:   Pain Intensity 1: 3  Post Session:  3     Therapeutic Exercise: (40 ):  Exercises as outlined below to improve mobility and strength. Required moderate verbal and tactile cues to promote proper body posture and promote proper body mechanics. Progressed resistance, range and repetitions as indicated.      UBE mod resistance, 8 mins  Active & resisted elbow flexion/ext  Biceps curls, 10#, 2 x 10 -  Resisted scap ex - elev/dep, ret/prot, modified PNF  Supine sh prot, 2 x 10, 10#   Supine sh press, 6#, 2 x 10  Supine sh flex  deg, green Tband, 2 x 10 (also in sidelying)  Supine sh PNF patterns, green Tband. 2 x 10 - held  Rhythmic stab IR/ER at 45 & 90 deg abd                        Flex/ext at 90 & 110 deg  Sidelying ER, 2 x 10 , 2#  Prone rows, 6#, 2 x 10 -  Prone ext, 3#, 2 x 10  Prone horiz abd with isometric holds end rg  Scap depression ex's - isometric into table, 10 reps   Sh IR, 30#, 2 x 10  Jeremiah Sh ext & rows, greenTband, 2 x 10  Sh ER, 20#, 2 x 10  Rows, 35#, 2 x 10  Fwd press, 25#, 2 x 10  sh flex, scaption, 2 x 10 ea - in mirror to minimise sh shrug, red Tband  Wall slides, bent elbow, 2 x 10  Sh press jeremiah, red Tband, 1 x 10  Wall circles  Boing - 2 reps 30-40 sec  Manual Therapy (20 min): PROM/stretching for sh flexion, abduction, ER/IR. Jt mobs for pain reduction. Passive stretching as edd to regain full elbow ROM, STM to biceps area and scar tissue mob post elbow    HEP: As directed. Therapeutic Modalities: ice prn    Treatment/Session Assessment:    · Response to Treatment:  Patient tolerated treatment well - pain seems to have settled down and was able to increase weights again but will evaluate Friday for any increased pain. Mild discomfort end rg IR at post asp shoulder and occasional pain around [facility] jt. Better scapulohumeral rhythm with should flexion/scaption and increased strength on MMT. Will continue gradual progression of strengthening to restore functional use of R UE. Patient feeling better about progress and open to later modification of weight training. · Compliance with Program/Exercises: Compliant with HEP. Recommendations/Intent for next treatment session: \"Next visit will focus on restoring full ROM and progressive strengthening as tolerated. FREQUENCY/DURATION: Follow patient 2 times a week for 12 weeks to address above goals, and upon reassessment will adjust frequency and duration as progress indicates.   Total Treatment Duration:  PT Patient Time In/Time Out  Time In: 1205  Time Out: 1305  No. Of Visits: 15/20  Katty Meehan Marlee Cervantes, PT,

## 2017-07-28 ENCOUNTER — HOSPITAL ENCOUNTER (OUTPATIENT)
Dept: PHYSICAL THERAPY | Age: 37
Discharge: HOME | End: 2017-07-28
Payer: COMMERCIAL

## 2017-07-28 PROCEDURE — 97110 THERAPEUTIC EXERCISES: CPT

## 2017-07-28 PROCEDURE — 97140 MANUAL THERAPY 1/> REGIONS: CPT

## 2017-07-28 NOTE — PROGRESS NOTES
Oscar Mcginnis  : 1980 [facility] at [facility]  [address], [address]  [phone]   [phone]         OUTPATIENT PHYSICAL THERAPY:Daily Note 2017    ICD-10: Treatment Diagnosis: Pain in right shoulder (M25.511)  Incomplete rotator cuff teaImpingement syndrome of right shoulder (M75.41)r or rupture of right shoulder, not specified as traumatic (M75.111)    Precautions/Allergies:   Penicillins and Sulfa (sulfonamide antibiotics)  Fall Risk Score: 1 (? 5 = High Risk)  MD Orders: Evaluate and treat MEDICAL/REFERRING DIAGNOSIS:  Impingement syndrome of right shoulder [M75.41]  Incomplete rotator cuff tear or rupture of right shoulder, not specified as traumatic [M75.111]   DATE OF ONSET: chronic  REFERRING PHYSICIAN: Dana Stevens MD  RETURN PHYSICIAN APPOINTMENT: 17     ASSESSMENT:  Mr. Ana María Red is now about 6 weeks s/p R rotator cuff repair and has been seen in therapy for PROM gradually progressing to AROM and light strengthening exercises in accordance with rehab protocol. He is doing very well with close to full ROM except for some minor residual restrictions in overhead elevation and IR. He has slightly abnormal scapulohumeral rhythm as yet which should improve with improving scapula and rotator cuff strength Hecan continue to benefit from skilled therapy to fully restore ROM and progress with more aggressive strengthening to allow return of normal functtional use of R UE and modified recreational activities. PROBLEM LIST (Impacting functional limitations):  1. Decreased Strength  2. Decreased ADL/Functional Activities  3. Increased Pain  4. Decreased Flexibility/Joint Mobility  5. Decreased [place] with Home Exercise Program INTERVENTIONS PLANNED:  1. Home Exercise Program (HEP)  2. Manual Therapy  3. Range of Motion (ROM)  4.  Therapeutic Exercise/Strengthening  5. modalities/taping as indicated   TREATMENT PLAN:  Effective Dates: 5/31/17 TO 8/31/17. Frequency/Duration: 2 times a week for 12 weeks  GOALS: (Goals have been discussed and agreed upon with patient.)  - goals revised due to recent surgery  Short-Term Functional Goals: Time Frame: 2-4 weeks  1. Increase shoulder flexion PROM to >140 deg and ER >45 deg to allow full AROM shoulder - met  2. Decrease pain level by 2/10 for reaching and lifting ADL's - met  3. [place] with HEP with minimal cueing - met  4. Patient to be aware of post-op precautions with posture, positioning, and movt to protect repair. DISCHARGE GOALS: Time Frame: 12 weeks  1. Restore full ROM R shoulder for performance of all UE ADL's - prgressing  2. Patient to report minimal levels of pain with normal ADL's - progressing  3. Increase strength to at least 4/5 in all muscle groups for return to recreational activities - progressing  4. Improve score on DASH by > 8 points for ease with dressing and lifting ADL's - progressing  5. [place] with advanced HEP with no cueing - progressing  Rehabilitation Potential For Stated Goals: GOOD  Regarding Moni Cleaning Sr.'s therapy, I certify that the treatment plan above will be carried out by a therapist or under their direction. Thank you for this referral,  Elliott Grider PT     Referring Physician Signature: Ron Fam MD              Date                    The information in this section was collected on 4/17/17 (except where otherwise noted). HISTORY:   History of Present Injury/Illness (Reason for Referral):  Patient reports pain in R shoulder on and off for past couple of years, with recent worsening of symptoms in past couple of months. Also reports intermittent popping/clicking with certain movts. Pain mostly present with reaching overhead, lifting/carrying heavy objects. Sometimes it will ache for part of the day. Sometimes painful at night.  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 Works out 5 days/week doing weight lifting - has modified this recently due to shoulder pain (normally would do bench press, incline press, lat pull-downs, rows, front and lateral raises etc. Has stopped pull-ups due to pain and used lighter weights for other ex's. Also has R elbow pain with numbness in lateral fingers - cubital tunnel syndrome which he will have surgery for. States that he is waiting for return call from doctor regarding possibly having shoulder surgery at the same time if he is going to need it at later time anyway. Had cortisone shot 2 weeks ago with no relief of pain and possibly worsening of symptoms. 5/31/17: Patient had surgery 5/19 - has been in sling since then. Took pain meds a few days but no longer taking anything. Pain moderate - mostly bothersome at night. Still has some numbness in lateral 2 fingers. Past Medical History/Comorbidities:   Mr. Zambrano  has a past medical history of Arthritis; GERD (gastroesophageal reflux disease); HTN (hypertension); and Nausea & vomiting. He also has no past medical history of Difficult intubation; Malignant hyperthermia due to anesthesia; or Pseudocholinesterase deficiency. Mr. Zambrano  has a past surgical history that includes wisdom teeth extraction and other surgical.  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 (Patient taking differently: Take 20 mg by mouth daily. Indications: hypercholesterolemia), Disp: 90 Tab, Rfl: 3    lisinopril-hydroCHLOROthiazide (PRINZIDE, ZESTORETIC) 20-25 mg per tablet, Take 1 Tab by mouth daily. , Disp: 90 Tab, Rfl: 3    amLODIPine (NORVASC) 5 mg tablet, Take 1 Tab by mouth daily. (Patient taking differently: Take 5 mg by mouth daily. Indications: hypertension), Disp: 90 Tab, Rfl: 3   Date Last Reviewed:  7/28/2017     Number of Personal Factors/Comorbidities that affect the Plan of Care: 1-2: MODERATE COMPLEXITY   EXAMINATION:   Observation/Orthostatic Postural Assessment: Very muscular build with moderately rounded shoulders. R UE immobilised in sling with abduction pillow. Bandaging around elbow, forearm. Mild residual swelling. Palpation:    Moderate generalised tenderness s/ surgery  ROM:       LUE Assessment  LUE Assessment (WDL): Exceptions to WDL  LUE AROM  L Shoulder Flexion: 160  L Shoulder Extension: 35  L Shoulder Internal Rotation: 50  L Shoulder External Rotation: 95              RUE Assessment  RUE Assessment (WDL): Exception to WDL  RUE PROM  R Shoulder Flexion: 145   R shoulder abduction 140  R Shoulder Internal Rotation: 55  R Shoulder External Rotation: 70  R elbow ext -0        Strength:    LUE Strength, Tone, Sensation  LUE Strength, Tone, Sensation (WDL): Within defined limits        RUE Strength, Tone, Sensation  RUE Strength, Tone, Sensation (WDL): Exception to WDL  RUE Strength: 3+/5             Special Tests:   Neurological Screen:  WDL  (except for mild numbness 4th and 5th fingers due to cubital tunnel syndrome)  Functional Mobility:      limited with all UE ADL's  Balance: WDL   Body Structures Involved:  1. Nerves  2. Joints  3. Muscles  4. Ligaments Body Functions Affected:  1. Sensory/Pain  2. Neuromusculoskeletal  3. Movement Related Activities and Participation Affected:  1. General Tasks and Demands  2. Mobility  3. Self Care   Number of elements (examined above) that affect the Plan of Care: 3: MODERATE COMPLEXITY   CLINICAL PRESENTATION:   Presentation: Evolving clinical presentation with changing clinical characteristics: MODERATE COMPLEXITY   CLINICAL DECISION MAKING:   Outcome Measure:    Tool Used: Disabilities of the Arm, Shoulder and Hand (DASH) Questionnaire - Quick Version  Score:  Initial: 20/55  Most Recent: 22/55 (Date: 7/7/17 )   Interpretation of Score: The DASH is designed to measure the activities of daily living in person's with upper extremity dysfunction or pain. Each section is scored on a 1-5 scale, 5 representing the greatest disability. The scores of each section are added together for a total score of 55. Score 11 12-19 20-28 29-37 38-45 46-54 55   Modifier CH CI CJ CK CL CM CN       Medical Necessity:   · Patient is expected to demonstrate progress in strength, range of motion and functional technique to increase independence with all UE ADL's. · Skilled intervention continues to be required due to pain, decreased ROM & strength limiting UE ADL's. Reason for Services/Other Comments:  · Patient continues to continues to require modification of therapeutic interventions to increase complexity of exercises. · Patient continues to require skilled intervention due to pain, decreased ROM, and strength limiting UE ADL's. Use of outcome tool(s) and clinical judgement create a POC that gives a: Questionable prediction of patient's progress: MODERATE COMPLEXITY            TREATMENT:   (In addition to Assessment/Re-Assessment sessions the following treatments were rendered)  Pre-treatment Symptoms/Complaints: States shoulder felt tight and sore early part of week - seems to have settled down now  Pain: Initial:   Pain Intensity 1: 3  Post Session:  3     Therapeutic Exercise: (40 ):  Exercises as outlined below to improve mobility and strength. Required moderate verbal and tactile cues to promote proper body posture and promote proper body mechanics. Progressed resistance, range and repetitions as indicated.      UBE mod resistance, 8 mins  Active & resisted elbow flexion/ext  Biceps curls, 10#, 2 x 10 -  Resisted scap ex - elev/dep, ret/prot, modified PNF  Supine sh prot, 2 x 10, 10#   Supine sh press, 6#, 2 x 10  Supine sh flex  deg, green Tband, 2 x 10 (also in sidelying)  Supine sh PNF patterns, green Tband. 2 x 10 - held  Rhythmic stab IR/ER at 45 & 90 deg abd                        Flex/ext at 90 & 110 deg  Sidelying ER, 2 x 10 , 2#  Prone rows, 6#, 2 x 10 -  Prone ext, 3#, 2 x 10  Prone horiz abd with isometric holds end rg  Scap depression ex's - isometric into table, 10 reps   Sh IR, 30#, 2 x 10  Jeremiah Sh ext & rows, greenTband, 2 x 10  Sh ER, 20#, 2 x 10  Rows, 35#, 2 x 10  Fwd press, 25#, 2 x 10  sh flex, scaption, 2 x 10 ea - in mirror to minimise sh shrug, red Tband  Wall slides, bent elbow, 2 x 10  Sh press jeremiah, red Tband, 1 x 10  Wall circles  Boing - 2 reps 30-40 sec  Manual Therapy (20 min): PROM/stretching for sh flexion, abduction, ER/IR. Jt mobs for pain reduction. Rhythmic stab for ER/IR various posns & flex/ext at 90 and 100 deg flexion, man res ex - emhasising eccentrics  HEP: As directed. Therapeutic Modalities: ice prn    Treatment/Session Assessment:    · Response to Treatment:  Patient tolerated treatment well - some increased pain earlier in week but now mostly settled down so will persist with gradually increasing exercise intensity. Need to work on post cuff strengthening - weak in horiz abd and prone scaption. . Mild discomfort end rg IR at post asp shoulder and occasional pain around [facility] jt. Better scapulohumeral rhythm with should flexion/scaption and increased strength on MMT. · Compliance with Program/Exercises: Compliant with HEP. Recommendations/Intent for next treatment session: \"Next visit will focus on progressive strengthening as tolerated. Restore good rot cuff control and scap stability  FREQUENCY/DURATION: Follow patient 2 times a week for 12 weeks to address above goals, and upon reassessment will adjust frequency and duration as progress indicates.   Total Treatment Duration:  PT Patient Time In/Time Out  Time In: 1200  Time Out: 1300  No. Of Visits: 16/21  Juan Jose Lange PT,

## 2017-07-31 ENCOUNTER — HOSPITAL ENCOUNTER (OUTPATIENT)
Dept: PHYSICAL THERAPY | Age: 37
Discharge: HOME | End: 2017-07-31
Payer: COMMERCIAL

## 2017-07-31 PROCEDURE — 97140 MANUAL THERAPY 1/> REGIONS: CPT

## 2017-07-31 PROCEDURE — 97110 THERAPEUTIC EXERCISES: CPT

## 2017-07-31 NOTE — PROGRESS NOTES
Paul Napoles  : 1980 [facility]  [address], [address]  [phone]   [phone]         OUTPATIENT PHYSICAL THERAPY:Daily Note 2017    ICD-10: Treatment Diagnosis: Pain in right shoulder (M25.511)  Incomplete rotator cuff teaImpingement syndrome of right shoulder (M75.41)r or rupture of right shoulder, not specified as traumatic (M75.111)    Precautions/Allergies:   Penicillins and Sulfa (sulfonamide antibiotics)  Fall Risk Score: 1 (? 5 = High Risk)  MD Orders: Evaluate and treat MEDICAL/REFERRING DIAGNOSIS:  Impingement syndrome of right shoulder [M75.41]  Incomplete rotator cuff tear or rupture of right shoulder, not specified as traumatic [M75.111]   DATE OF ONSET: chronic  REFERRING PHYSICIAN: Nirmal Oneal MD  RETURN PHYSICIAN APPOINTMENT: 17     ASSESSMENT:  Mr. Carin Garcia is now about 6 weeks s/p R rotator cuff repair and has been seen in therapy for PROM gradually progressing to AROM and light strengthening exercises in accordance with rehab protocol. He is doing very well with close to full ROM except for some minor residual restrictions in overhead elevation and IR. He has slightly abnormal scapulohumeral rhythm as yet which should improve with improving scapula and rotator cuff strength Hecan continue to benefit from skilled therapy to fully restore ROM and progress with more aggressive strengthening to allow return of normal functtional use of R UE and modified recreational activities. PROBLEM LIST (Impacting functional limitations):  1. Decreased Strength  2. Decreased ADL/Functional Activities  3. Increased Pain  4. Decreased Flexibility/Joint Mobility  5. Decreased [place] with Home Exercise Program INTERVENTIONS PLANNED:  1. Home Exercise Program (HEP)  2. Manual Therapy  3. Range of Motion (ROM)  4.  Therapeutic Exercise/Strengthening  5. modalities/taping as indicated   TREATMENT PLAN:  Effective Dates: 5/31/17 TO 8/31/17. Frequency/Duration: 2 times a week for 12 weeks  GOALS: (Goals have been discussed and agreed upon with patient.)  - goals revised due to recent surgery  Short-Term Functional Goals: Time Frame: 2-4 weeks  1. Increase shoulder flexion PROM to >140 deg and ER >45 deg to allow full AROM shoulder - met  2. Decrease pain level by 2/10 for reaching and lifting ADL's - met  3. [place] with HEP with minimal cueing - met  4. Patient to be aware of post-op precautions with posture, positioning, and movt to protect repair. DISCHARGE GOALS: Time Frame: 12 weeks  1. Restore full ROM R shoulder for performance of all UE ADL's - prgressing  2. Patient to report minimal levels of pain with normal ADL's - progressing  3. Increase strength to at least 4/5 in all muscle groups for return to recreational activities - progressing  4. Improve score on DASH by > 8 points for ease with dressing and lifting ADL's - progressing  5. [place] with advanced HEP with no cueing - progressing  Rehabilitation Potential For Stated Goals: GOOD  Regarding Emmie Bella Sr.'s therapy, I certify that the treatment plan above will be carried out by a therapist or under their direction. Thank you for this referral,  Brooke Das PT     Referring Physician Signature: Vania Oh MD              Date                    The information in this section was collected on 4/17/17 (except where otherwise noted). HISTORY:   History of Present Injury/Illness (Reason for Referral):  Patient reports pain in R shoulder on and off for past couple of years, with recent worsening of symptoms in past couple of months. Also reports intermittent popping/clicking with certain movts. Pain mostly present with reaching overhead, lifting/carrying heavy objects. Sometimes it will ache for part of the day. Sometimes painful at night.  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 Works out 5 days/week doing weight lifting - has modified this recently due to shoulder pain (normally would do bench press, incline press, lat pull-downs, rows, front and lateral raises etc. Has stopped pull-ups due to pain and used lighter weights for other ex's. Also has R elbow pain with numbness in lateral fingers - cubital tunnel syndrome which he will have surgery for. States that he is waiting for return call from doctor regarding possibly having shoulder surgery at the same time if he is going to need it at later time anyway. Had cortisone shot 2 weeks ago with no relief of pain and possibly worsening of symptoms. 5/31/17: Patient had surgery 5/19 - has been in sling since then. Took pain meds a few days but no longer taking anything. Pain moderate - mostly bothersome at night. Still has some numbness in lateral 2 fingers. Past Medical History/Comorbidities:   Mr. Farhana Gutierrez  has a past medical history of Arthritis; GERD (gastroesophageal reflux disease); HTN (hypertension); and Nausea & vomiting. He also has no past medical history of Difficult intubation; Malignant hyperthermia due to anesthesia; or Pseudocholinesterase deficiency. Mr. Farhana Gutierrez  has a past surgical history that includes wisdom teeth extraction and other surgical.  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 (Patient taking differently: Take 20 mg by mouth daily. Indications: hypercholesterolemia), Disp: 90 Tab, Rfl: 3    lisinopril-hydroCHLOROthiazide (PRINZIDE, ZESTORETIC) 20-25 mg per tablet, Take 1 Tab by mouth daily. , Disp: 90 Tab, Rfl: 3    amLODIPine (NORVASC) 5 mg tablet, Take 1 Tab by mouth daily. (Patient taking differently: Take 5 mg by mouth daily. Indications: hypertension), Disp: 90 Tab, Rfl: 3   Date Last Reviewed:  7/31/2017     Number of Personal Factors/Comorbidities that affect the Plan of Care: 1-2: MODERATE COMPLEXITY   EXAMINATION:   Observation/Orthostatic Postural Assessment: Very muscular build with moderately rounded shoulders. R UE immobilised in sling with abduction pillow. Bandaging around elbow, forearm. Mild residual swelling. Palpation:    Moderate generalised tenderness s/ surgery  ROM:       LUE Assessment  LUE Assessment (WDL): Exceptions to WDL  LUE AROM  L Shoulder Flexion: 160  L Shoulder Extension: 35  L Shoulder Internal Rotation: 50  L Shoulder External Rotation: 95              RUE Assessment  RUE Assessment (WDL): Exception to WDL  RUE PROM  R Shoulder Flexion: 145   R shoulder abduction 140  R Shoulder Internal Rotation: 55  R Shoulder External Rotation: 70  R elbow ext -0        Strength:    LUE Strength, Tone, Sensation  LUE Strength, Tone, Sensation (WDL): Within defined limits        RUE Strength, Tone, Sensation  RUE Strength, Tone, Sensation (WDL): Exception to WDL  RUE Strength: 3+/5             Special Tests:   Neurological Screen:  WDL  (except for mild numbness 4th and 5th fingers due to cubital tunnel syndrome)  Functional Mobility:      limited with all UE ADL's  Balance: WDL   Body Structures Involved:  1. Nerves  2. Joints  3. Muscles  4. Ligaments Body Functions Affected:  1. Sensory/Pain  2. Neuromusculoskeletal  3. Movement Related Activities and Participation Affected:  1. General Tasks and Demands  2. Mobility  3. Self Care   Number of elements (examined above) that affect the Plan of Care: 3: MODERATE COMPLEXITY   CLINICAL PRESENTATION:   Presentation: Evolving clinical presentation with changing clinical characteristics: MODERATE COMPLEXITY   CLINICAL DECISION MAKING:   Outcome Measure:    Tool Used: Disabilities of the Arm, Shoulder and Hand (DASH) Questionnaire - Quick Version  Score:  Initial: 20/55  Most Recent: 22/55 (Date: 7/7/17 )   Interpretation of Score: The DASH is designed to measure the activities of daily living in person's with upper extremity dysfunction or pain. Each section is scored on a 1-5 scale, 5 representing the greatest disability. The scores of each section are added together for a total score of 55. Score 11 12-19 20-28 29-37 38-45 46-54 55   Modifier CH CI CJ CK CL CM CN       Medical Necessity:   · Patient is expected to demonstrate progress in strength, range of motion and functional technique to increase independence with all UE ADL's. · Skilled intervention continues to be required due to pain, decreased ROM & strength limiting UE ADL's. Reason for Services/Other Comments:  · Patient continues to continues to require modification of therapeutic interventions to increase complexity of exercises. · Patient continues to require skilled intervention due to pain, decreased ROM, and strength limiting UE ADL's. Use of outcome tool(s) and clinical judgement create a POC that gives a: Questionable prediction of patient's progress: MODERATE COMPLEXITY            TREATMENT:   (In addition to Assessment/Re-Assessment sessions the following treatments were rendered)  Pre-treatment Symptoms/Complaints: States shoulder was fine over the weekend  Pain: Initial:   Pain Intensity 1: 2  Post Session:  2     Therapeutic Exercise: (40 ):  Exercises as outlined below to improve mobility and strength. Required moderate verbal and tactile cues to promote proper body posture and promote proper body mechanics. Progressed resistance, range and repetitions as indicated. UBE mod resistance, 8 mins  Active & resisted elbow flexion/ext  Biceps curls, 10#, 2 x 10 -  Supine sh prot, 2 x 10, 10#   Supine sh press, 6#, 2 x 10  Supine sh flex  deg, green Tband, 2 x 10 (also in sidelying)  Supine sh PNF patterns, green Tband. 2 x 10 - held  Rhythmic stab IR/ER at 45 & 90 deg abd                        Flex/ext at 90 & 110 deg  Sidelying ER, 2 x 12 , 2#  Prone rows, 7#, 2 x 10 -  Prone ext, 3#, 2 x 10  Prone horiz abd with isometric holds end rg  Prone scaption, 5 reps  Prone sh ER at 90º abd - active assist  Scap depression ex's - isometric into table, 10 reps   Quadruped posn - sh prot, L UE raises (closed chain R UE)  Sh IR, 35#, 2 x 10  Sh ER, 20#, 2 x 10  Rows, 35#, 2 x 10  Fwd press, 25#, 2 x 10  sh flex, scaption, 2 x 10 ea - in mirror to minimise sh shrug, red Tband  Wall slides, bent elbow, 2 x 10  Wall circles  Small ball throws agst wall  Boing - 2 reps 30-40 sec, body blade  Manual Therapy (20 min): PROM/stretching for sh flexion, abduction, ER/IR. Jt mobs for pain reduction. Rhythmic stab for ER/IR various posns & flex/ext at 90 and 100 deg flexion, man res ex - emhasising eccentrics  HEP: As directed. Progress to ER ex's with band at 90º posn  Therapeutic Modalities: ice prn    Treatment/Session Assessment:    · Response to Treatment:  Patient tolerated treatment well - mild residual pain post shoulder at end rg flexion and IR. Need to work on post cuff strengthening - weak in horiz abd and prone scaption. .Was able to tolerated ER ex's in 90 deg posn and did fairly well with ball throws and body blade. Patient would like to be able to return to throwing ball with son if possible. Better scapulohumeral rhythm with should flexion/scaption and increased strength on MMT. · Compliance with Program/Exercises: Compliant with HEP. Recommendations/Intent for next treatment session: \"Next visit will focus on progressive strengthening as tolerated. Restore good rot cuff control and scap stability  FREQUENCY/DURATION: Follow patient 2 times a week for 12 weeks to address above goals, and upon reassessment will adjust frequency and duration as progress indicates.   Total Treatment Duration:  PT Patient Time In/Time Out  Time In: 1230  Time Out: 1330  No. Of Visits: 17/22  Nils Bullard PT,

## 2017-08-07 ENCOUNTER — HOSPITAL ENCOUNTER (OUTPATIENT)
Dept: PHYSICAL THERAPY | Age: 37
Discharge: HOME | End: 2017-08-07
Payer: COMMERCIAL

## 2017-08-07 PROCEDURE — 97110 THERAPEUTIC EXERCISES: CPT

## 2017-08-07 NOTE — PROGRESS NOTES
Becky Simmons  : 1980 [facility] [address] [facility]  [address], [address]  [phone]   [phone]         OUTPATIENT PHYSICAL THERAPY:Daily Note 2017    ICD-10: Treatment Diagnosis: Pain in right shoulder (M25.511)  Incomplete rotator cuff teaImpingement syndrome of right shoulder (M75.41)r or rupture of right shoulder, not specified as traumatic (M75.111)    Precautions/Allergies:   Penicillins and Sulfa (sulfonamide antibiotics)  Fall Risk Score: 1 (? 5 = High Risk)  MD Orders: Evaluate and treat MEDICAL/REFERRING DIAGNOSIS:  Impingement syndrome of right shoulder [M75.41]  Incomplete rotator cuff tear or rupture of right shoulder, not specified as traumatic [M75.111]   DATE OF ONSET: chronic  REFERRING PHYSICIAN: Ari Huerta MD  RETURN PHYSICIAN APPOINTMENT: 17     ASSESSMENT:  Mr. Janessa Luo is now about 6 weeks s/p R rotator cuff repair and has been seen in therapy for PROM gradually progressing to AROM and light strengthening exercises in accordance with rehab protocol. He is doing very well with close to full ROM except for some minor residual restrictions in overhead elevation and IR. He has slightly abnormal scapulohumeral rhythm as yet which should improve with improving scapula and rotator cuff strength Hecan continue to benefit from skilled therapy to fully restore ROM and progress with more aggressive strengthening to allow return of normal functtional use of R UE and modified recreational activities. PROBLEM LIST (Impacting functional limitations):  1. Decreased Strength  2. Decreased ADL/Functional Activities  3. Increased Pain  4. Decreased Flexibility/Joint Mobility  5. Decreased [place] with Home Exercise Program INTERVENTIONS PLANNED:  1. Home Exercise Program (HEP)  2. Manual Therapy  3. Range of Motion (ROM)  4.  Therapeutic Exercise/Strengthening  5. modalities/taping as indicated   TREATMENT PLAN:  Effective Dates: 5/31/17 TO 8/31/17. Frequency/Duration: 2 times a week for 12 weeks  GOALS: (Goals have been discussed and agreed upon with patient.)  - goals revised due to recent surgery  Short-Term Functional Goals: Time Frame: 2-4 weeks  1. Increase shoulder flexion PROM to >140 deg and ER >45 deg to allow full AROM shoulder - met  2. Decrease pain level by 2/10 for reaching and lifting ADL's - met  3. [place] with HEP with minimal cueing - met  4. Patient to be aware of post-op precautions with posture, positioning, and movt to protect repair. DISCHARGE GOALS: Time Frame: 12 weeks  1. Restore full ROM R shoulder for performance of all UE ADL's - prgressing  2. Patient to report minimal levels of pain with normal ADL's - progressing  3. Increase strength to at least 4/5 in all muscle groups for return to recreational activities - progressing  4. Improve score on DASH by > 8 points for ease with dressing and lifting ADL's - progressing  5. [place] with advanced HEP with no cueing - progressing  Rehabilitation Potential For Stated Goals: GOOD  Regarding Jae Tripp Sr.'s therapy, I certify that the treatment plan above will be carried out by a therapist or under their direction. Thank you for this referral,  Elkin Day PT     Referring Physician Signature: Sharmaine Marin MD              Date                    The information in this section was collected on 4/17/17 (except where otherwise noted). HISTORY:   History of Present Injury/Illness (Reason for Referral):  Patient reports pain in R shoulder on and off for past couple of years, with recent worsening of symptoms in past couple of months. Also reports intermittent popping/clicking with certain movts. Pain mostly present with reaching overhead, lifting/carrying heavy objects. Sometimes it will ache for part of the day. Sometimes painful at night.  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 Works out 5 days/week doing weight lifting - has modified this recently due to shoulder pain (normally would do bench press, incline press, lat pull-downs, rows, front and lateral raises etc. Has stopped pull-ups due to pain and used lighter weights for other ex's. Also has R elbow pain with numbness in lateral fingers - cubital tunnel syndrome which he will have surgery for. States that he is waiting for return call from doctor regarding possibly having shoulder surgery at the same time if he is going to need it at later time anyway. Had cortisone shot 2 weeks ago with no relief of pain and possibly worsening of symptoms. 5/31/17: Patient had surgery 5/19 - has been in sling since then. Took pain meds a few days but no longer taking anything. Pain moderate - mostly bothersome at night. Still has some numbness in lateral 2 fingers. Past Medical History/Comorbidities:   Mr. Vitaly Hilario  has a past medical history of Arthritis; GERD (gastroesophageal reflux disease); HTN (hypertension); and Nausea & vomiting. He also has no past medical history of Difficult intubation; Malignant hyperthermia due to anesthesia; or Pseudocholinesterase deficiency. Mr. Vitaly Hilario  has a past surgical history that includes wisdom teeth extraction and other surgical.  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 (Patient taking differently: Take 20 mg by mouth daily. Indications: hypercholesterolemia), Disp: 90 Tab, Rfl: 3    lisinopril-hydroCHLOROthiazide (PRINZIDE, ZESTORETIC) 20-25 mg per tablet, Take 1 Tab by mouth daily. , Disp: 90 Tab, Rfl: 3    amLODIPine (NORVASC) 5 mg tablet, Take 1 Tab by mouth daily. (Patient taking differently: Take 5 mg by mouth daily. Indications: hypertension), Disp: 90 Tab, Rfl: 3   Date Last Reviewed:  8/7/2017     Number of Personal Factors/Comorbidities that affect the Plan of Care: 1-2: MODERATE COMPLEXITY   EXAMINATION:   Observation/Orthostatic Postural Assessment: Very muscular build with moderately rounded shoulders. R UE immobilised in sling with abduction pillow. Bandaging around elbow, forearm. Mild residual swelling. Palpation:    Moderate generalised tenderness s/ surgery  ROM:       LUE Assessment  LUE Assessment (WDL): Exceptions to WDL  LUE AROM  L Shoulder Flexion: 160  L Shoulder Extension: 35  L Shoulder Internal Rotation: 50  L Shoulder External Rotation: 95              RUE Assessment  RUE Assessment (WDL): Exception to WDL  RUE PROM  R Shoulder Flexion: 145   R shoulder abduction 140  R Shoulder Internal Rotation: 55  R Shoulder External Rotation: 70  R elbow ext -0        Strength:    LUE Strength, Tone, Sensation  LUE Strength, Tone, Sensation (WDL): Within defined limits        RUE Strength, Tone, Sensation  RUE Strength, Tone, Sensation (WDL): Exception to WDL  RUE Strength: 3+/5             Special Tests:   Neurological Screen:  WDL  (except for mild numbness 4th and 5th fingers due to cubital tunnel syndrome)  Functional Mobility:      limited with all UE ADL's  Balance: WDL   Body Structures Involved:  1. Nerves  2. Joints  3. Muscles  4. Ligaments Body Functions Affected:  1. Sensory/Pain  2. Neuromusculoskeletal  3. Movement Related Activities and Participation Affected:  1. General Tasks and Demands  2. Mobility  3. Self Care   Number of elements (examined above) that affect the Plan of Care: 3: MODERATE COMPLEXITY   CLINICAL PRESENTATION:   Presentation: Evolving clinical presentation with changing clinical characteristics: MODERATE COMPLEXITY   CLINICAL DECISION MAKING:   Outcome Measure:    Tool Used: Disabilities of the Arm, Shoulder and Hand (DASH) Questionnaire - Quick Version  Score:  Initial: 20/55  Most Recent: 22/55 (Date: 7/7/17 )   Interpretation of Score: The DASH is designed to measure the activities of daily living in person's with upper extremity dysfunction or pain. Each section is scored on a 1-5 scale, 5 representing the greatest disability. The scores of each section are added together for a total score of 55. Score 11 12-19 20-28 29-37 38-45 46-54 55   Modifier CH CI CJ CK CL CM CN       Medical Necessity:   · Patient is expected to demonstrate progress in strength, range of motion and functional technique to increase independence with all UE ADL's. · Skilled intervention continues to be required due to pain, decreased ROM & strength limiting UE ADL's. Reason for Services/Other Comments:  · Patient continues to continues to require modification of therapeutic interventions to increase complexity of exercises. · Patient continues to require skilled intervention due to pain, decreased ROM, and strength limiting UE ADL's. Use of outcome tool(s) and clinical judgement create a POC that gives a: Questionable prediction of patient's progress: MODERATE COMPLEXITY            TREATMENT:   (In addition to Assessment/Re-Assessment sessions the following treatments were rendered)  Pre-treatment Symptoms/Complaints: States he picked up something last week and had difficulty with raising shoulder later that day. Better by the following day however and fine since  Pain: Initial:   Pain Intensity 1: 2  Post Session:  2     Therapeutic Exercise: (55 ):  Exercises as outlined below to improve mobility and strength. Required moderate verbal and tactile cues to promote proper body posture and promote proper body mechanics. Progressed resistance, range and repetitions as indicated.      UBE mod resistance, 10 mins  Biceps curls, 10#, 2 x 10 -  Supine sh prot, 2 x 10, 10# , cirucmduction  Supine sh press, 7#, 2 x 12  Supine sh flex  deg, blue Tband, 2 x 20 (also in sidelying)  Supine sh PNF patterns, green Tband. 2 x 10 -   Rhythmic stab IR/ER at  90 deg abd                        Flex/ext at 90 & 110 deg  Sidelying ER, 3 x 12 , 2#  Prone rows, 7#, 2 x 12-  Prone ext, 4#, 2 x 12  Prone horiz abd with isometric holds end rg, 1#, 2 X 10  Prone scaption, 12 reps, 1 X 10  Prone sh ER at 90º abd - active assist, man res  Scap depression ex's - isometric into table, 10 reps   Quadruped posn - sh prot, L UE raises (closed chain R UE)  Sh IR, 40#, 2 x 15  Sh ER, 25#, 2 x 15  Rows, 40#, 2 x 12  Fwd press, 35#, 2 x 12  sh flex, scaption, 2 x 12 ea - in mirror to minimise sh shrug, red Tband  Sh ER, green Tband @ 90/90 posn, 2 X 12 - slow/fast  Wall slides, bent elbow, 2 x 10  Wall circles  Small ball throws agst wall, 90/90 posn, reps as edd  Boing - 3 reps 30-40 sec, body blade - reps as edd various posns  Manual Therapy (5 min): PROM/stretching for sh flexion, ER/IR. Jt mobs for pain reduction. Rhythmic stab for ER/IR various posns & flex/ext at 90 and 100 deg flexion,   HEP: As directed. Progress to ER ex's with band at 90º posn, practise ball throw agst wall  Therapeutic Modalities:  Treatment/Session Assessment:    · Response to Treatment:  Patient tolerated treatment well - mild residual pain post shoulder at end rg flexion and IR. Need to work on post cuff strengthening - weak in horiz abd and prone scaption but was able to progress to 1#. Makenzie Mcneill Tolerating ER ex's in 90 deg posn and doing well with ball throws and body blade - no increased pain afterwards reported. Patient would like to be able to return to throwing ball with son if possible. Better scapulohumeral rhythm with should flexion/scaption and increased strength on MMT - still weaker than L as yet. · Compliance with Program/Exercises: Compliant with HEP. Recommendations/Intent for next treatment session: \"Next visit will focus on progressive strengthening as tolerated.  Restore good rot cuff control and scap stability. Continue to progress with more advanced UE exercises  FREQUENCY/DURATION: Follow patient 2 times a week for 12 weeks to address above goals, and upon reassessment will adjust frequency and duration as progress indicates.   Total Treatment Duration:  PT Patient Time In/Time Out  Time In: 1200  Time Out: 1300  No. Of Visits: 18/23  Sim Andrews, PT,

## 2017-08-11 ENCOUNTER — HOSPITAL ENCOUNTER (OUTPATIENT)
Dept: PHYSICAL THERAPY | Age: 37
Discharge: HOME | End: 2017-08-11
Payer: COMMERCIAL

## 2017-08-11 PROCEDURE — 97110 THERAPEUTIC EXERCISES: CPT

## 2017-08-11 NOTE — PROGRESS NOTES
Sruthi Pink  : 1980 [facility] at [facility]  [address], [address]  [phone]   [phone]         OUTPATIENT PHYSICAL THERAPY:Daily Note 2017    ICD-10: Treatment Diagnosis: Pain in right shoulder (M25.511)  Incomplete rotator cuff teaImpingement syndrome of right shoulder (M75.41)r or rupture of right shoulder, not specified as traumatic (M75.111)    Precautions/Allergies:   Penicillins and Sulfa (sulfonamide antibiotics)  Fall Risk Score: 1 (? 5 = High Risk)  MD Orders: Evaluate and treat MEDICAL/REFERRING DIAGNOSIS:  Impingement syndrome of right shoulder [M75.41]  Incomplete rotator cuff tear or rupture of right shoulder, not specified as traumatic [M75.111]   DATE OF ONSET: chronic  REFERRING PHYSICIAN: Miguel Hartman MD  RETURN PHYSICIAN APPOINTMENT: 17     ASSESSMENT:  Mr. Srinivasan Rosado is now about 6 weeks s/p R rotator cuff repair and has been seen in therapy for PROM gradually progressing to AROM and light strengthening exercises in accordance with rehab protocol. He is doing very well with close to full ROM except for some minor residual restrictions in overhead elevation and IR. He has slightly abnormal scapulohumeral rhythm as yet which should improve with improving scapula and rotator cuff strength Hecan continue to benefit from skilled therapy to fully restore ROM and progress with more aggressive strengthening to allow return of normal functtional use of R UE and modified recreational activities. PROBLEM LIST (Impacting functional limitations):  1. Decreased Strength  2. Decreased ADL/Functional Activities  3. Increased Pain  4. Decreased Flexibility/Joint Mobility  5. Decreased [place] with Home Exercise Program INTERVENTIONS PLANNED:  1. Home Exercise Program (HEP)  2. Manual Therapy  3. Range of Motion (ROM)  4.  Therapeutic Exercise/Strengthening  5. modalities/taping as indicated   TREATMENT PLAN:  Effective Dates: 5/31/17 TO 8/31/17. Frequency/Duration: 2 times a week for 12 weeks  GOALS: (Goals have been discussed and agreed upon with patient.)  - goals revised due to recent surgery  Short-Term Functional Goals: Time Frame: 2-4 weeks  1. Increase shoulder flexion PROM to >140 deg and ER >45 deg to allow full AROM shoulder - met  2. Decrease pain level by 2/10 for reaching and lifting ADL's - met  3. [place] with HEP with minimal cueing - met  4. Patient to be aware of post-op precautions with posture, positioning, and movt to protect repair. DISCHARGE GOALS: Time Frame: 12 weeks  1. Restore full ROM R shoulder for performance of all UE ADL's - prgressing  2. Patient to report minimal levels of pain with normal ADL's - progressing  3. Increase strength to at least 4/5 in all muscle groups for return to recreational activities - progressing  4. Improve score on DASH by > 8 points for ease with dressing and lifting ADL's - progressing  5. [place] with advanced HEP with no cueing - progressing  Rehabilitation Potential For Stated Goals: GOOD  Regarding Chauncey Petersen Sr.'s therapy, I certify that the treatment plan above will be carried out by a therapist or under their direction. Thank you for this referral,  Terence Babinski, PT     Referring Physician Signature: Shyam Jaramillo MD              Date                    The information in this section was collected on 4/17/17 (except where otherwise noted). HISTORY:   History of Present Injury/Illness (Reason for Referral):  Patient reports pain in R shoulder on and off for past couple of years, with recent worsening of symptoms in past couple of months. Also reports intermittent popping/clicking with certain movts. Pain mostly present with reaching overhead, lifting/carrying heavy objects. Sometimes it will ache for part of the day. Sometimes painful at night.  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 Works out 5 days/week doing weight lifting - has modified this recently due to shoulder pain (normally would do bench press, incline press, lat pull-downs, rows, front and lateral raises etc. Has stopped pull-ups due to pain and used lighter weights for other ex's. Also has R elbow pain with numbness in lateral fingers - cubital tunnel syndrome which he will have surgery for. States that he is waiting for return call from doctor regarding possibly having shoulder surgery at the same time if he is going to need it at later time anyway. Had cortisone shot 2 weeks ago with no relief of pain and possibly worsening of symptoms. 5/31/17: Patient had surgery 5/19 - has been in sling since then. Took pain meds a few days but no longer taking anything. Pain moderate - mostly bothersome at night. Still has some numbness in lateral 2 fingers. Past Medical History/Comorbidities:   Mr. Vitaly Hilario  has a past medical history of Arthritis; GERD (gastroesophageal reflux disease); HTN (hypertension); and Nausea & vomiting. He also has no past medical history of Difficult intubation; Malignant hyperthermia due to anesthesia; or Pseudocholinesterase deficiency. Mr. Vitaly Hilario  has a past surgical history that includes wisdom teeth extraction and other surgical.  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 (Patient taking differently: Take 20 mg by mouth daily. Indications: hypercholesterolemia), Disp: 90 Tab, Rfl: 3    lisinopril-hydroCHLOROthiazide (PRINZIDE, ZESTORETIC) 20-25 mg per tablet, Take 1 Tab by mouth daily. , Disp: 90 Tab, Rfl: 3    amLODIPine (NORVASC) 5 mg tablet, Take 1 Tab by mouth daily. (Patient taking differently: Take 5 mg by mouth daily. Indications: hypertension), Disp: 90 Tab, Rfl: 3   Date Last Reviewed:  8/11/2017     Number of Personal Factors/Comorbidities that affect the Plan of Care: 1-2: MODERATE COMPLEXITY   EXAMINATION:   Observation/Orthostatic Postural Assessment: Very muscular build with moderately rounded shoulders. R UE immobilised in sling with abduction pillow. Bandaging around elbow, forearm. Mild residual swelling. Palpation:    Moderate generalised tenderness s/ surgery  ROM:       LUE Assessment  LUE Assessment (WDL): Exceptions to WDL  LUE AROM  L Shoulder Flexion: 160  L Shoulder Extension: 35  L Shoulder Internal Rotation: 50  L Shoulder External Rotation: 95              RUE Assessment  RUE Assessment (WDL): Exception to WDL  RUE PROM  R Shoulder Flexion: 145   R shoulder abduction 140  R Shoulder Internal Rotation: 55  R Shoulder External Rotation: 70  R elbow ext -0        Strength:    LUE Strength, Tone, Sensation  LUE Strength, Tone, Sensation (WDL): Within defined limits        RUE Strength, Tone, Sensation  RUE Strength, Tone, Sensation (WDL): Exception to WDL  RUE Strength: 3+/5             Special Tests:   Neurological Screen:  WDL  (except for mild numbness 4th and 5th fingers due to cubital tunnel syndrome)  Functional Mobility:      limited with all UE ADL's  Balance: WDL   Body Structures Involved:  1. Nerves  2. Joints  3. Muscles  4. Ligaments Body Functions Affected:  1. Sensory/Pain  2. Neuromusculoskeletal  3. Movement Related Activities and Participation Affected:  1. General Tasks and Demands  2. Mobility  3. Self Care   Number of elements (examined above) that affect the Plan of Care: 3: MODERATE COMPLEXITY   CLINICAL PRESENTATION:   Presentation: Evolving clinical presentation with changing clinical characteristics: MODERATE COMPLEXITY   CLINICAL DECISION MAKING:   Outcome Measure:    Tool Used: Disabilities of the Arm, Shoulder and Hand (DASH) Questionnaire - Quick Version  Score:  Initial: 20/55  Most Recent: 22/55 (Date: 7/7/17 )   Interpretation of Score: The DASH is designed to measure the activities of daily living in person's with upper extremity dysfunction or pain. Each section is scored on a 1-5 scale, 5 representing the greatest disability. The scores of each section are added together for a total score of 55. Score 11 12-19 20-28 29-37 38-45 46-54 55   Modifier CH CI CJ CK CL CM CN       Medical Necessity:   · Patient is expected to demonstrate progress in strength, range of motion and functional technique to increase independence with all UE ADL's. · Skilled intervention continues to be required due to pain, decreased ROM & strength limiting UE ADL's. Reason for Services/Other Comments:  · Patient continues to continues to require modification of therapeutic interventions to increase complexity of exercises. · Patient continues to require skilled intervention due to pain, decreased ROM, and strength limiting UE ADL's. Use of outcome tool(s) and clinical judgement create a POC that gives a: Questionable prediction of patient's progress: MODERATE COMPLEXITY            TREATMENT:   (In addition to Assessment/Re-Assessment sessions the following treatments were rendered)  Pre-treatment Symptoms/Complaints: Patient did okay while away - shoulder a little sore but not bad  Pain: Initial:   Pain Intensity 1: 2  Post Session:  2     Therapeutic Exercise: (55 ):  Exercises as outlined below to improve mobility and strength. Required moderate verbal and tactile cues to promote proper body posture and promote proper body mechanics. Progressed resistance, range and repetitions as indicated. UBE mod resistance, 5 mins  Biceps curls, 10#, 2 x 10  Supine sh prot, 2 x 10, 10#  Supine sh flex  deg, green Tband, 2 x 10  Supine sh PNF patterns, green Tband.  2 x 10; man resisted PNF with slow reversals  Rhythmic stab IR/ER at 45 & 90 deg abd                        Flex/ext at 90 & 110 deg  Sidelying ER, 2 x 10 , 3#  Prone rows, 9#, 2 x 10  Prone ext, 4#, 2 x 10  Prone horiz abd & scaption,1#, 1 X 12  Scap depression ex's - isometric into table, 10 reps  Sh IR, 35#, 2 x 10  Jeremiah Sh ext & rows, greenTband, 2 x 10  Sh ER, 25#, 2 x 10  Rows, 40#, 2 x 10  Fwd press, 35#, 2 x 10  Overhead rows, 40#, 2 X 10  sh flex, scaption, 2 x 10 ea - in mirror to minimise sh shrug, red Tband  Wall slides, bent elbow, 2 x 10  Ball against wall  Wall circles  Boing & body blade  Manual Therapy (5 min): PROM/stretching for sh flexion, abduction, ER/IR. jt mobs - inf glides  HEP: As directed. Therapeutic Modalities:     Treatment/Session Assessment:    · Response to Treatment:  Patient tolerated treatment well - good steady progress continues. Able to tolerate flexion ROM with no pain and essentially full ROM. Gradual improvement in IR/ER ROM with no muscle spasm - IR WDL. ER WDL. Very slight abnormal scapulohumeral rhythm with scaption Able to tolerate increased weight without increased pain Will continue gradual progression of strengthening to restore functional use of R UE - close to ready for discharge on HEP with precautions regarding weightlifting in future  · Compliance with Program/Exercises: Compliant with HEP. · Recommendations/Intent for next treatment session: \"Next visit will focus on strengthening and progression to independent HEP.     Total Treatment Duration:  PT Patient Time In/Time Out  Time In: 1200  Time Out: 1300  No. Of Visits: 19/24  Rosario Omer PT,

## 2017-08-14 ENCOUNTER — HOSPITAL ENCOUNTER (OUTPATIENT)
Dept: PHYSICAL THERAPY | Age: 37
Discharge: HOME | End: 2017-08-14
Payer: COMMERCIAL

## 2017-08-14 PROCEDURE — 97110 THERAPEUTIC EXERCISES: CPT

## 2017-08-14 NOTE — PROGRESS NOTES
Valeriano Bell  : 1980 [facility] at [facility]  [address], [address]  [phone]   [phone]         OUTPATIENT PHYSICAL THERAPY:Daily Note and Progress Report 2017    ICD-10: Treatment Diagnosis: Pain in right shoulder (M25.511)  Incomplete rotator cuff teaImpingement syndrome of right shoulder (M75.41)r or rupture of right shoulder, not specified as traumatic (M75.111)    Precautions/Allergies:   Penicillins and Sulfa (sulfonamide antibiotics)  Fall Risk Score: 1 (? 5 = High Risk)  MD Orders: Evaluate and treat MEDICAL/REFERRING DIAGNOSIS:  Impingement syndrome of right shoulder [M75.41]  Incomplete rotator cuff tear or rupture of right shoulder, not specified as traumatic [M75.111]   DATE OF ONSET: chronic  REFERRING PHYSICIAN: Taty Darnell MD  RETURN PHYSICIAN APPOINTMENT: 17     ASSESSMENT: Mr. Geovany York is now 10 weeks s/p R rotator cuff repair and has been seen in therapy for PROM/stretching gradually progressing to AROM and more advanced strengthening/stabilisation/light plyometric exercises in accordance with rehab protocol. He is doing very well with full ROM and good strength. He has very slight residual abnormal scapulohumeral rhythm which should improve with continued strengthening of scapula and rotator cuff strength. He is expected to be compliant with this as would like to return to some sort of modified weight lifting regimen. We have discussed modifications to weightlifting and identified most important exercises to continue with for the longterm. Goals have been met and he is ready for discharge on independent exercise program - will see for one more visit before discharge. PROBLEM LIST (Impacting functional limitations):  1. Decreased Strength  2. Decreased ADL/Functional Activities  3. Increased Pain  4. Decreased Flexibility/Joint Mobility  5.  Decreased [place] with Home Exercise Program INTERVENTIONS PLANNED:  1. Home Exercise Program (HEP)  2. Manual Therapy  3. Range of Motion (ROM)  4. Therapeutic Exercise/Strengthening  5. modalities/taping as indicated   TREATMENT PLAN:  Effective Dates: 5/31/17 TO 8/31/17. Frequency/Duration: 2 times a week for 12 weeks  GOALS: (Goals have been discussed and agreed upon with patient.)  - goals revised due to recent surgery  Short-Term Functional Goals: Time Frame: 2-4 weeks  1. Increase shoulder flexion PROM to >140 deg and ER >45 deg to allow full AROM shoulder - met  2. Decrease pain level by 2/10 for reaching and lifting ADL's - met  3. [place] with HEP with minimal cueing - met  4. Patient to be aware of post-op precautions with posture, positioning, and movt to protect repair. - met  DISCHARGE GOALS: Time Frame: 12 weeks  1. Restore full ROM R shoulder for performance of all UE ADL's - met  2. Patient to report minimal levels of pain with normal ADL's - met  3. Increase strength to at least 4/5 in all muscle groups for return to recreational activities - met  4. Improve score on DASH by > 8 points for ease with dressing and lifting ADL's - met  5. [place] with advanced HEP with no cueing - met  Rehabilitation Potential For Stated Goals: GOOD  Regarding Norwood Bernheim Sr.'s therapy, I certify that the treatment plan above will be carried out by a therapist or under their direction. Thank you for this referral,  Ronan Dumont PT     Referring Physician Signature: Homer Kamara MD              Date                    The information in this section was collected on 4/17/17 (except where otherwise noted). HISTORY:   History of Present Injury/Illness (Reason for Referral):  Patient reports pain in R shoulder on and off for past couple of years, with recent worsening of symptoms in past couple of months. Also reports intermittent popping/clicking with certain movts. Pain mostly present with reaching overhead, lifting/carrying heavy objects. Sometimes it will ache for part of the day. Sometimes painful at night. 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 Works out 5 days/week doing weight lifting - has modified this recently due to shoulder pain (normally would do bench press, incline press, lat pull-downs, rows, front and lateral raises etc. Has stopped pull-ups due to pain and used lighter weights for other ex's. Also has R elbow pain with numbness in lateral fingers - cubital tunnel syndrome which he will have surgery for. States that he is waiting for return call from doctor regarding possibly having shoulder surgery at the same time if he is going to need it at later time anyway. Had cortisone shot 2 weeks ago with no relief of pain and possibly worsening of symptoms. 5/31/17: Patient had surgery 5/19 - has been in sling since then. Took pain meds a few days but no longer taking anything. Pain moderate - mostly bothersome at night. Still has some numbness in lateral 2 fingers. Past Medical History/Comorbidities:   Mr. Pastora Sam  has a past medical history of Arthritis; GERD (gastroesophageal reflux disease); HTN (hypertension); and Nausea & vomiting. He also has no past medical history of Difficult intubation; Malignant hyperthermia due to anesthesia; or Pseudocholinesterase deficiency. Mr. Pastora aSm  has a past surgical history that includes wisdom teeth extraction and other surgical.  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 (Patient taking differently: Take 20 mg by mouth daily.  Indications: hypercholesterolemia), Disp: 90 Tab, Rfl: 3   lisinopril-hydroCHLOROthiazide (PRINZIDE, ZESTORETIC) 20-25 mg per tablet, Take 1 Tab by mouth daily. , Disp: 90 Tab, Rfl: 3    amLODIPine (NORVASC) 5 mg tablet, Take 1 Tab by mouth daily. (Patient taking differently: Take 5 mg by mouth daily. Indications: hypertension), Disp: 90 Tab, Rfl: 3   Date Last Reviewed:  8/14/2017     Number of Personal Factors/Comorbidities that affect the Plan of Care: 1-2: MODERATE COMPLEXITY   EXAMINATION:   Observation/Orthostatic Postural Assessment: Very muscular build with moderately rounded shoulders. Well healed arthroscopic incisons. No swelling  Palpation:    No tenderness  ROM:       LUE Assessment  LUE Assessment (WDL): Exceptions to WDL  LUE AROM  L Shoulder Flexion: 160  L Shoulder Extension: 35  L Shoulder Internal Rotation: 50  L Shoulder External Rotation: 95              RUE Assessment  RUE Assessment (WDL): Exception to WDL  RUE PROM  R Shoulder Flexion: 160  R shoulder abduction 160  R Shoulder Internal Rotation: 60  R Shoulder External Rotation: 80  R elbow ext -0        Strength:    LUE Strength, Tone, Sensation  LUE Strength, Tone, Sensation (WDL): Within defined limits        RUE Strength, Tone, Sensation  RUE Strength, Tone, Sensation (WDL): Exception to WDL  RUE Strength: 4+/5             Special Tests:   Neurological Screen:  WDL  (except for mild numbness 4th and 5th fingers due to cubital tunnel syndrome)  Functional Mobility:   WDL  Balance: WDL   Body Structures Involved:  1. Nerves  2. Joints  3. Muscles  4. Ligaments Body Functions Affected:  1. Sensory/Pain  2. Neuromusculoskeletal  3. Movement Related Activities and Participation Affected:  1. General Tasks and Demands  2. Mobility  3.  Self Care   Number of elements (examined above) that affect the Plan of Care: 3: MODERATE COMPLEXITY   CLINICAL PRESENTATION:   Presentation: Evolving clinical presentation with changing clinical characteristics: MODERATE COMPLEXITY   CLINICAL DECISION MAKING:   Outcome Measure: Tool Used: Disabilities of the Arm, Shoulder and Hand (DASH) Questionnaire - Quick Version  Score:  Initial: 20/55  Most Recent: 16/55 (Date: 8/14/17 )   Interpretation of Score: The DASH is designed to measure the activities of daily living in person's with upper extremity dysfunction or pain. Each section is scored on a 1-5 scale, 5 representing the greatest disability. The scores of each section are added together for a total score of 55. Score 11 12-19 20-28 29-37 38-45 46-54 55   Modifier CH CI CJ CK CL CM CN       Medical Necessity:   · Patient is expected to demonstrate progress in strength, range of motion and functional technique to increase independence with all UE ADL's. · Skilled intervention continues to be required due to pain, decreased ROM & strength limiting UE ADL's. Reason for Services/Other Comments:  · Patient continues to continues to require modification of therapeutic interventions to increase complexity of exercises. · Patient continues to require skilled intervention due to pain, decreased ROM, and strength limiting UE ADL's. Use of outcome tool(s) and clinical judgement create a POC that gives a: Questionable prediction of patient's progress: MODERATE COMPLEXITY            TREATMENT:   (In addition to Assessment/Re-Assessment sessions the following treatments were rendered)  Pre-treatment Symptoms/Complaints: Patient did he is doing well - good report from doctor  Pain: Initial:   Pain Intensity 1: 1  Post Session:  1     Therapeutic Exercise: (55 ):  Exercises as outlined below to improve mobility and strength. Required moderate verbal and tactile cues to promote proper body posture and promote proper body mechanics. Progressed resistance, range and repetitions as indicated. UBE mod resistance, 5 mins  Biceps curls, 10#, 2 x 10  Supine sh prot, 2 x 10, 10#  Supine sh flex  deg, green Tband, 2 x 10  Supine sh PNF patterns, green Tband.  2 x 10; man resisted PNF with slow reversals  Rhythmic stab IR/ER at 45 & 90 deg abd                        Flex/ext at 90 & 110 deg  Sidelying ER, 2 x 10 , 3#  Prone rows, 9#, 2 x 10  Prone ext, 4#, 2 x 10  Prone horiz abd & scaption,1#, 1 X 12  Scap depression ex's - isometric into table, 10 reps  Sh IR, 35#, 2 x 10  Jeremiah Sh ext & rows, greenTband, 2 x 10  Sh ER, 25#, 2 x 10  Rows, 40#, 2 x 10  Fwd press, 35#, 2 x 10  Overhead rows, 40#, 2 X 10  sh flex, scaption, 2 x 10 ea - in mirror to minimise sh shrug, red Tband  Wall slides, bent elbow, 2 x 10  Ball against wall  Wall circles  Boing & body blade  Manual Therapy (5 min): PROM/stretching for sh flexion, abduction, ER/IR. jt mobs - inf glides  HEP: As directed. Therapeutic Modalities:     Treatment/Session Assessment:    · Response to Treatment:  Patient tolerated treatment well - good steady progress continues. Able to tolerate flexion ROM with no pain and essentially full ROM. Gradual improvement in IR/ER ROM with no muscle spasm - IR WDL. ER WDL. Very slight abnormal scapulohumeral rhythm with scaption Able to tolerate increased weight without increased pain Will continue gradual progression of strengthening to restore functional use of R UE - close to ready for discharge on HEP with precautions regarding weightlifting in future. At next visit review complete HEP and highlight most impt exercises to continue with longterm. · Compliance with Program/Exercises: Compliant with HEP. · Recommendations/Intent for next treatment session: \"Next visit will focus on strengthening and progression to independent HEP.     Total Treatment Duration:  PT Patient Time In/Time Out  Time In: 1220  Time Out: 1320  No. Of Visits: 20/25  Rayne Libman, PT,

## 2017-08-18 ENCOUNTER — HOSPITAL ENCOUNTER (OUTPATIENT)
Dept: PHYSICAL THERAPY | Age: 37
Discharge: HOME | End: 2017-08-18
Payer: COMMERCIAL

## 2017-08-18 PROCEDURE — 97110 THERAPEUTIC EXERCISES: CPT

## 2017-08-18 NOTE — PROGRESS NOTES
Álvaro Hare Sr.  : 1980 [facility]  [address], [address]  [phone]   [phone]         OUTPATIENT PHYSICAL THERAPY:Daily Note and Discharge 2017    ICD-10: Treatment Diagnosis: Pain in right shoulder (M25.511)  Incomplete rotator cuff teaImpingement syndrome of right shoulder (M75.41)r or rupture of right shoulder, not specified as traumatic (M75.111)    Precautions/Allergies:   Penicillins and Sulfa (sulfonamide antibiotics)  Fall Risk Score: 1 (? 5 = High Risk)  MD Orders: Evaluate and treat MEDICAL/REFERRING DIAGNOSIS:  Impingement syndrome of right shoulder [M75.41]  Incomplete rotator cuff tear or rupture of right shoulder, not specified as traumatic [M75.111]   DATE OF ONSET: chronic  REFERRING PHYSICIAN: Deric Hayward MD  RETURN PHYSICIAN APPOINTMENT: Aug 2017     ASSESSMENT:  Mr. Jaycob Kirkpatrick is now 10 weeks s/p R rotator cuff repair and has been seen in therapy for PROM/stretching gradually progressing to AROM and more advanced strengthening/stabilisation/light plyometric exercises in accordance with rehab protocol. He is doing very well with full ROM and good strength. He has very slight residual abnormal scapulohumeral rhythm which should improve with continued strengthening of scapula and rotator cuff strength. He is expected to be compliant with this as would like to return to some sort of modified weight lifting regimen. We have discussed modifications to weightlifting and identified most important exercises to continue with for the longterm. Goals have been met and he is ready for discharge on independent exercise program   PROBLEM LIST (Impacting functional limitations):  1. Decreased Strength  2. Decreased ADL/Functional Activities  3. Increased Pain  4. Decreased Flexibility/Joint Mobility  5. Decreased [place] with Home Exercise Program INTERVENTIONS PLANNED:  1.  Home Exercise Program (HEP)  2. Manual Therapy  3. Range of Motion (ROM)  4. Therapeutic Exercise/Strengthening  5. modalities/taping as indicated     GOALS: (Goals have been discussed and agreed upon with patient.)  - goals revised due to recent surgery  Short-Term Functional Goals: Time Frame: 2-4 weeks  1. Increase shoulder flexion PROM to >140 deg and ER >45 deg to allow full AROM shoulder - met  2. Decrease pain level by 2/10 for reaching and lifting ADL's - met  3. [place] with HEP with minimal cueing - met  4. Patient to be aware of post-op precautions with posture, positioning, and movt to protect repair. - met  DISCHARGE GOALS: Time Frame: 12 weeks  1. Restore full ROM R shoulder for performance of all UE ADL's - met  2. Patient to report minimal levels of pain with normal ADL's - met  3. Increase strength to at least 4/5 in all muscle groups for return to recreational activities - met  4. Improve score on DASH by > 8 points for ease with dressing and lifting ADL's - met  5. [place] with advanced HEP with no cueing - met    Thank you for this referral,  Doug Castanon PT                 The information in this section was collected on 4/17/17 (except where otherwise noted). HISTORY:   History of Present Injury/Illness (Reason for Referral):  Patient reports pain in R shoulder on and off for past couple of years, with recent worsening of symptoms in past couple of months. Also reports intermittent popping/clicking with certain movts. Pain mostly present with reaching overhead, lifting/carrying heavy objects. Sometimes it will ache for part of the day. Sometimes painful at night. 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  Works out 5 days/week doing weight lifting - has modified this recently due to shoulder pain (normally would do bench press, incline press, lat pull-downs, rows, front and lateral raises etc. Has stopped pull-ups due to pain and used lighter weights for other ex's. Also has R elbow pain with numbness in lateral fingers - cubital tunnel syndrome which he will have surgery for. States that he is waiting for return call from doctor regarding possibly having shoulder surgery at the same time if he is going to need it at later time anyway. Had cortisone shot 2 weeks ago with no relief of pain and possibly worsening of symptoms. 5/31/17: Patient had surgery 5/19 - has been in sling since then. Took pain meds a few days but no longer taking anything. Pain moderate - mostly bothersome at night. Still has some numbness in lateral 2 fingers. Past Medical History/Comorbidities:   Mr. Francisco Garcia  has a past medical history of Arthritis; GERD (gastroesophageal reflux disease); HTN (hypertension); and Nausea & vomiting. He also has no past medical history of Difficult intubation; Malignant hyperthermia due to anesthesia; or Pseudocholinesterase deficiency. Mr. Francisco Garcia  has a past surgical history that includes wisdom teeth extraction and other surgical.  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 (Patient taking differently: Take 20 mg by mouth daily. Indications: hypercholesterolemia), Disp: 90 Tab, Rfl: 3    lisinopril-hydroCHLOROthiazide (PRINZIDE, ZESTORETIC) 20-25 mg per tablet, Take 1 Tab by mouth daily. , Disp: 90 Tab, Rfl: 3    amLODIPine (NORVASC) 5 mg tablet, Take 1 Tab by mouth daily. (Patient taking differently: Take 5 mg by mouth daily.  Indications: hypertension), Disp: 90 Tab, Rfl: 3   Date Last Reviewed:  8/18/2017     Number of Personal Factors/Comorbidities that affect the Plan of Care: 1-2: MODERATE COMPLEXITY   EXAMINATION: Observation/Orthostatic Postural Assessment: Very muscular build with moderately rounded shoulders. Well-healed arthroscopic incisions; nos swelling  Palpation:   No residual tenderness  ROM:       LUE Assessment  LUE Assessment (WDL): Exceptions to WDL  LUE AROM  L Shoulder Flexion: 160  L Shoulder Extension: 35  L Shoulder Internal Rotation: 50  L Shoulder External Rotation: 95              RUE Assessment  RUE Assessment (WDL): Exception to WDL  RUE PROM  R Shoulder Flexion: 160  R shoulder abduction 160  R Shoulder Internal Rotation: 60  R Shoulder External Rotation: 90  R elbow ext -0        Strength:    LUE Strength, Tone, Sensation  LUE Strength, Tone, Sensation (WDL): Within defined limits        RUE Strength, Tone, Sensation  RUE Strength, Tone, Sensation (WDL): Exception to WDL  RUE Strength: 4+/5             Special Tests:   Neurological Screen:  WDL   Functional Mobility:      WDL  Balance: WDL   Body Structures Involved:  1. Nerves  2. Joints  3. Muscles  4. Ligaments Body Functions Affected:  1. Sensory/Pain  2. Neuromusculoskeletal  3. Movement Related Activities and Participation Affected:  1. General Tasks and Demands  2. Mobility  3. Self Care   Number of elements (examined above) that affect the Plan of Care: 3: MODERATE COMPLEXITY   CLINICAL PRESENTATION:   Presentation: Evolving clinical presentation with changing clinical characteristics: MODERATE COMPLEXITY   CLINICAL DECISION MAKING:   Outcome Measure: Tool Used: Disabilities of the Arm, Shoulder and Hand (DASH) Questionnaire - Quick Version  Score:  Initial: 20/55  Most Recent: 16/55 (Date: 8/1817 )   Interpretation of Score: The DASH is designed to measure the activities of daily living in person's with upper extremity dysfunction or pain. Each section is scored on a 1-5 scale, 5 representing the greatest disability. The scores of each section are added together for a total score of 55.     Score 11 12-19 20-28 29-37 38-45 46-54 55 Modifier CH CI CJ CK CL CM CN          Use of outcome tool(s) and clinical judgement create a POC that gives a: Questionable prediction of patient's progress: MODERATE COMPLEXITY            TREATMENT:   (In addition to Assessment/Re-Assessment sessions the following treatments were rendered)  Pre-treatment Symptoms/Complaints: Patient did he is doing well - feels comfortable with HEP  Pain: Initial:   Pain Intensity 1: 1  Post Session:  1     Therapeutic Exercise: (55 ):  Exercises as outlined below to improve mobility and strength. Required moderate verbal and tactile cues to promote proper body posture and promote proper body mechanics. Progressed resistance, range and repetitions as indicated. UBE mod resistance, 5 mins  Biceps curls, 10#, 2 x 10  Supine sh prot, 2 x 10, 10#  Supine sh flex  deg, green Tband, 2 x 10  Supine sh PNF patterns, green Tband. 2 x 10; man resisted PNF with slow reversals  Rhythmic stab IR/ER at 45 & 90 deg abd                        Flex/ext at 90 & 110 deg  Sidelying ER, 2 x 10 , 3#  Prone rows, 9#, 2 x 10  Prone ext, 4#, 2 x 10  Prone horiz abd & scaption,1#, 1 X 12  Scap depression ex's - isometric into table, 10 reps  Sh IR, 35#, 2 x 10  Jeremiah Sh ext & rows, greenTband, 2 x 10  Sh ER, 25#, 2 x 10  Rows, 40#, 2 x 10  Fwd press, 35#, 2 x 10  Overhead rows, 40#, 2 X 10  sh flex, scaption, 2 x 10 ea - in mirror to minimise sh shrug, red Tband  Wall slides, bent elbow, 2 x 10  Ball against wall  Wall circles  Boing & body blade  Manual Therapy (5 min): PROM/stretching for sh flexion, abduction, ER/IR. jt mobs - inf glides  HEP: As directed. Complete review of HEP for long-term follow through  Therapeutic Modalities:     Treatment/Session Assessment:    · Response to Treatment:  Patient tolerated treatment well - excellent progress. Able to tolerate flexion ROM with no pain and full ROM. Normal scapulohumeral rhythm unless fatigued. Able to tolerate increased weight without increased pain and should be able to continue improving this with adherence to HEP. Discussed importance of continuing with this as part of overall program.  · Compliance with Program/Exercises: Compliant with HEP. · Recommendations/Intent for next treatment session: Discharge from therapy.     Total Treatment Duration:  PT Patient Time In/Time Out  Time In: 1220  Time Out: 1320  No. Of Visits: 21 (post-op)/26 (total)  Terence Babinski, PT,

## 2017-11-01 ENCOUNTER — HOSPITAL ENCOUNTER (OUTPATIENT)
Dept: PHYSICAL THERAPY | Age: 37
Discharge: HOME | End: 2017-11-01
Payer: COMMERCIAL

## 2017-11-01 PROCEDURE — 97161 PT EVAL LOW COMPLEX 20 MIN: CPT

## 2017-11-01 PROCEDURE — 97110 THERAPEUTIC EXERCISES: CPT

## 2017-11-01 NOTE — PROGRESS NOTES
Ambulatory/Rehab Services H2 Model Falls Risk Assessment    Risk Factor Pts. ·   Confusion/Disorientation/Impulsivity  []    4 ·   Symptomatic Depression  []   2 ·   Altered Elimination  []   1 ·   Dizziness/Vertigo  []   1 ·   Gender (Male)  [x]   1 ·   Any administered antiepileptics (anticonvulsants):  []   2 ·   Any administered benzodiazepines:  []   1 ·   Visual Impairment (specify):  []   1 ·   Portable Oxygen Use  []   1 ·   Orthostatic ? BP  []   1 ·   History of Recent Falls (within 3 mos.)  []   5     Ability to Rise from Chair (choose one) Pts. ·   Ability to rise in a single movement  []   0 ·   Pushes up, successful in one attempt  []   1 ·   Multiple attempts, but successful  []   3 ·   Unable to rise without assistance  []   4   Total: (5 or greater = High Risk) 1     Falls Prevention Plan:   []                Physical Limitations to Exercise (specify):   []                Mobility Assistance Device (type):   []                Exercise/Equipment Adaptation (specify):    ©2010 [facility] [place] Patent #[id number].  Federal Law prohibits the replication, distribution or use without written permission from [facility] [employer]

## 2017-11-03 ENCOUNTER — HOSPITAL ENCOUNTER (OUTPATIENT)
Dept: PHYSICAL THERAPY | Age: 37
Discharge: HOME | End: 2017-11-03
Payer: COMMERCIAL

## 2017-11-03 PROCEDURE — 97140 MANUAL THERAPY 1/> REGIONS: CPT

## 2017-11-03 PROCEDURE — 97110 THERAPEUTIC EXERCISES: CPT

## 2017-11-03 NOTE — PROGRESS NOTES
[facility] at [facility]   [address], [address]  [phone]   [phone]    Maynor Hodges Sr.  : 1980       OUTPATIENT PHYSICAL THERAPY:Daily Note 11/3/2017    ICD-10: Treatment Diagnosis: low back pain M54.5, radiculopathy lumbar region M54.16  Precautions/Allergies:   Penicillins and Sulfa (sulfonamide antibiotics)   Fall Risk Score: 1 (? 5 = High Risk)  MD Orders: Eval and treat MEDICAL/REFERRING DIAGNOSIS:  Radiculopathy, lumbar region [M54.16]  Other intervertebral disc degeneration, lumbar region [M51.36]   DATE OF ONSET: several months  REFERRING PHYSICIAN: Sunny Serrano, NP  RETURN PHYSICIAN APPOINTMENT: as needed     INITIAL ASSESSMENT:  Mr. Elaine Ott presents with increased lower back pain over the past several months. He reports also having some prior history of lumbar pain. He noticed a couple of weeks ago that his pain increased and it felt burning in nature. He has had difficulty getting rid of it since then. 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 He does report lifting at work can increase his symptoms. He was diagnosed with DDD and referred to therapy to reduce his symptoms. PROBLEM LIST (Impacting functional limitations):  1. Decreased Strength  2. Decreased ADL/Functional Activities  3. Increased Pain  4. Decreased Flexibility/Joint Mobility INTERVENTIONS PLANNED:  1. Electrical Stimulation  2. Heat  3. Home Exercise Program (HEP)  4. Manual Therapy  5. Range of Motion (ROM)  6. Therapeutic Exercise/Strengthening  7. Ultrasound (US)   TREATMENT PLAN:  Effective Dates: 17 TO 17. Frequency/Duration: 2 times a week for 8 weeks and upon reassessment,  will adjust frequency and duration as progress indicates.       GOALS: (Goals have been discussed and agreed upon with patient.)  Short-Term Functional Goals: Time Frame: 4 weeks  1. Establish independent HEP with no cueing. 2. Pt will be able to report waking up with 4/10 pain rating or less. 3. Pt will be able to report sitting or standing for at least 45 minutes without increased pain. Discharge Goals: Time Frame: 8 weeks  1. Improve score on Oswestry by at least 5 points for improved ADL function. 2. Pt will be able to report working and performing his work functions without difficulty. 3. Pt will be able to return to prior activity level with little to no pain. Rehabilitation Potential For Stated Goals: Good  Regarding Melia Schaumann Sr.'s therapy, I certify that the treatment plan above will be carried out by a therapist or under their direction. Thank you for this referral,  Lakesha Riggins PT     Referring Physician Signature: Denia Li NP              Date                    The information in this section was collected on 11/1/17 (except where otherwise noted). HISTORY:   History of Present Injury/Illness (Reason for Referral):    Mr. Vince Montes, a 40year old male, presents with increased lower back pain over the past several months. He reports also having some prior history of lumbar pain. He noticed a couple of weeks ago that his pain increased and it felt burning in nature. He has had difficulty getting rid of it since then. 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 He does report lifting at work can increase his symptoms. He was diagnosed with DDD and referred to therapy to reduce his symptoms. Past Medical History/Comorbidities:   Mr. Vince Montes  has a past medical history of Arthritis; GERD (gastroesophageal reflux disease); HTN (hypertension); and Nausea & vomiting. He also has no past medical history of Difficult intubation; Malignant hyperthermia due to anesthesia; or Pseudocholinesterase deficiency.   Mr. Vince Montes  has a past surgical history that includes wisdom teeth extraction and other surgical.  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 autopap 5- 20 cm, Disp: , Rfl:     atorvastatin (LIPITOR) 20 mg tablet, Take 1 Tab by mouth daily. (Patient taking differently: Take 20 mg by mouth daily. Indications: hypercholesterolemia), Disp: 90 Tab, Rfl: 3    lisinopril-hydroCHLOROthiazide (PRINZIDE, ZESTORETIC) 20-25 mg per tablet, Take 1 Tab by mouth daily. , Disp: 90 Tab, Rfl: 3    amLODIPine (NORVASC) 5 mg tablet, Take 1 Tab by mouth daily. (Patient taking differently: Take 5 mg by mouth daily. Indications: hypertension), Disp: 90 Tab, Rfl: 3   Date Last Reviewed:  11/3/2017     Number of Personal Factors/Comorbidities that affect the Plan of Care: 1-2: MODERATE COMPLEXITY   EXAMINATION:   Palpation:          Mild pain noted at R PSIS and central lower back    ROM:        back ROM WFL-pain noted with FB  R hip ER 25 °  L hip IR 27 °              HS length good bilaterally. Poor hip ER bilaterally as well as decreased hip flexor flexibility                        Strength:        bilateral LE's WFL-  Slight decrease in core strength                  Special Tests: negative LLD, negative SLR  Neurological Screen: pt reports intermittent numbness in left sided toes 4 and 5  Balance:  good   Body Structures Involved:  1. Nerves  2. Bones  3. Joints  4. Muscles  5. Ligaments Body Functions Affected:  1. Sensory/Pain  2. Neuromusculoskeletal  3. Movement Related Activities and Participation Affected:  1. Mobility  2. Interpersonal Interactions and Relationships  3.  Community, Social and [place]   Number of elements (examined above) that affect the Plan of Care: 3: MODERATE COMPLEXITY   CLINICAL PRESENTATION:   Presentation: Stable and uncomplicated: LOW COMPLEXITY   CLINICAL DECISION MAKING:   Outcome Measure: Tool Used: Modified Oswestry Low Back Pain Questionnaire  Score:  Initial: 12/50  Most Recent: X/50 (Date: -- )   Interpretation of Score: Each section is scored on a 0-5 scale, 5 representing the greatest disability. The scores of each section are added together for a total score of 50. Score 0 1-10 11-20 21-30 31-40 41-49 50   Modifier CH CI CJ CK CL CM CN           Medical Necessity:   · Patient is expected to demonstrate progress in strength and range of motion to improve his overall standing and walking times as well as ability to return to his prior activity level. Reason for Services/Other Comments:  · Patient continues to require skilled intervention due to ongoing lumbar pain noted with standing and walking as well as lifting. Use of outcome tool(s) and clinical judgement create a POC that gives a: Questionable prediction of patient's progress: MODERATE COMPLEXITY            TREATMENT:   (In addition to Assessment/Re-Assessment sessions the following treatments were rendered)  Pre-treatment Symptoms/Complaints:  Pt reporting feeling better overall. He did have some pain this morning, but it has improved. Pain: Initial: Pain Intensity 1: 2  Post Session:  2/10     THERAPEUTIC EXERCISE: (30 minutes):  Exercises per grid below to improve mobility and strength. Required minimal verbal cues to promote proper body mechanics. Progressed resistance, range and repetitions as indicated. DKTC x 2 hold 15 sec  Treadmill walking 2. 5mph x 7 minutes  Prone hip flexor stretch with strap bilaterally 3x hold 30 sec  Supine HS stretching with strap bilaterally 3x hold 30 sec  Pelvic tilt x 20    Manual Therapy (  10 min   ):manual bilateral piriformis stretching and contract relax. Also PA's to lower back for improved pain control. Pt in prone.   Therapeutic Modalities:  IFC e-stim x 15 minutes to lower back with pt in prone in order to reduce his overall pain. Pillow placed under hips to reduce extensive lordosis    HEP: continue as directed  Treatment/Session Assessment:    · Response to Treatment:  Pt reporting feeling better later today then he did this morning. He continues to have extensive ER and buttocks tightness. We worked on this manually and with stretching. Pt received e-stim post treatment for pain control. He will continue with current HEP and return next week. · Compliance with Program/Exercises: Will assess as treatment progresses. · Recommendations/Intent for next treatment session: \"Next visit will focus on advancements to more challenging activities\".   Total Treatment Duration: 55 min  PT Patient Time In/Time Out  Time In: 1230  Time Out: 2665  Treatment number [id number] [facility],

## 2017-11-06 ENCOUNTER — HOSPITAL ENCOUNTER (OUTPATIENT)
Dept: PHYSICAL THERAPY | Age: 37
Discharge: HOME | End: 2017-11-06
Payer: COMMERCIAL

## 2017-11-06 PROCEDURE — 97110 THERAPEUTIC EXERCISES: CPT

## 2017-11-06 PROCEDURE — 97140 MANUAL THERAPY 1/> REGIONS: CPT

## 2017-11-06 NOTE — PROGRESS NOTES
[facility]   [address], [address]  [phone]   [phone]    Miguel Shaffer Sr.  : 1980       OUTPATIENT PHYSICAL THERAPY:Daily Note 2017    ICD-10: Treatment Diagnosis: low back pain M54.5, radiculopathy lumbar region M54.16  Precautions/Allergies:   Penicillins and Sulfa (sulfonamide antibiotics)   Fall Risk Score: 1 (? 5 = High Risk)  MD Orders: Eval and treat MEDICAL/REFERRING DIAGNOSIS:  Radiculopathy, lumbar region [M54.16]  Other intervertebral disc degeneration, lumbar region [M51.36]   DATE OF ONSET: several months  REFERRING PHYSICIAN: Juancho Serrano NP  RETURN PHYSICIAN APPOINTMENT: as needed     INITIAL ASSESSMENT:  Mr. Nena Barajas presents with increased lower back pain over the past several months. He reports also having some prior history of lumbar pain. He noticed a couple of weeks ago that his pain increased and it felt burning in nature. He has had difficulty getting rid of it since then. 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 He does report lifting at work can increase his symptoms. He was diagnosed with DDD and referred to therapy to reduce his symptoms. PROBLEM LIST (Impacting functional limitations):  1. Decreased Strength  2. Decreased ADL/Functional Activities  3. Increased Pain  4. Decreased Flexibility/Joint Mobility INTERVENTIONS PLANNED:  1. Electrical Stimulation  2. Heat  3. Home Exercise Program (HEP)  4. Manual Therapy  5. Range of Motion (ROM)  6. Therapeutic Exercise/Strengthening  7. Ultrasound (US)   TREATMENT PLAN:  Effective Dates: 17 TO 17. Frequency/Duration: 2 times a week for 8 weeks and upon reassessment,  will adjust frequency and duration as progress indicates.       GOALS: (Goals have been discussed and agreed upon with patient.)  Short-Term Functional Goals: Time Frame: 4 weeks  1. Establish independent HEP with no cueing. 2. Pt will be able to report waking up with 4/10 pain rating or less. 3. Pt will be able to report sitting or standing for at least 45 minutes without increased pain. Discharge Goals: Time Frame: 8 weeks  1. Improve score on Oswestry by at least 5 points for improved ADL function. 2. Pt will be able to report working and performing his work functions without difficulty. 3. Pt will be able to return to prior activity level with little to no pain. Rehabilitation Potential For Stated Goals: Good  Regarding Terri Mendoza Sr.'s therapy, I certify that the treatment plan above will be carried out by a therapist or under their direction. Thank you for this referral,  Shahnaz Mccoy PT     Referring Physician Signature: Liliana Santamaria NP              Date                    The information in this section was collected on 11/1/17 (except where otherwise noted). HISTORY:   History of Present Injury/Illness (Reason for Referral):    Mr. Elizabeth Gutierrez, a 40year old male, presents with increased lower back pain over the past several months. He reports also having some prior history of lumbar pain. He noticed a couple of weeks ago that his pain increased and it felt burning in nature. He has had difficulty getting rid of it since then. 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 He does report lifting at work can increase his symptoms. He was diagnosed with DDD and referred to therapy to reduce his symptoms. Past Medical History/Comorbidities:   Mr. Elizabeth Gutierrez  has a past medical history of Arthritis; GERD (gastroesophageal reflux disease); HTN (hypertension); and Nausea & vomiting. He also has no past medical history of Difficult intubation; Malignant hyperthermia due to anesthesia; or Pseudocholinesterase deficiency.   Mr. Elizabeth Gutierrez  has a past surgical history that includes wisdom teeth extraction and other surgical.  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 autopap 5- 20 cm, Disp: , Rfl:     atorvastatin (LIPITOR) 20 mg tablet, Take 1 Tab by mouth daily. (Patient taking differently: Take 20 mg by mouth daily. Indications: hypercholesterolemia), Disp: 90 Tab, Rfl: 3    lisinopril-hydroCHLOROthiazide (PRINZIDE, ZESTORETIC) 20-25 mg per tablet, Take 1 Tab by mouth daily. , Disp: 90 Tab, Rfl: 3    amLODIPine (NORVASC) 5 mg tablet, Take 1 Tab by mouth daily. (Patient taking differently: Take 5 mg by mouth daily. Indications: hypertension), Disp: 90 Tab, Rfl: 3   Date Last Reviewed:  11/6/2017     Number of Personal Factors/Comorbidities that affect the Plan of Care: 1-2: MODERATE COMPLEXITY   EXAMINATION:   Palpation:          Mild pain noted at R PSIS and central lower back    ROM:        back ROM WFL-pain noted with FB  R hip ER 25 °  L hip IR 27 °              HS length good bilaterally. Poor hip ER bilaterally as well as decreased hip flexor flexibility                        Strength:        bilateral LE's WFL-  Slight decrease in core strength                  Special Tests: negative LLD, negative SLR  Neurological Screen: pt reports intermittent numbness in left sided toes 4 and 5  Balance:  good   Body Structures Involved:  1. Nerves  2. Bones  3. Joints  4. Muscles  5. Ligaments Body Functions Affected:  1. Sensory/Pain  2. Neuromusculoskeletal  3. Movement Related Activities and Participation Affected:  1. Mobility  2. Interpersonal Interactions and Relationships  3.  Community, Social and [place]   Number of elements (examined above) that affect the Plan of Care: 3: MODERATE COMPLEXITY   CLINICAL PRESENTATION:   Presentation: Stable and uncomplicated: LOW COMPLEXITY   CLINICAL DECISION MAKING:   Outcome Measure: Tool Used: Modified Oswestry Low Back Pain Questionnaire  Score:  Initial: 12/50  Most Recent: X/50 (Date: -- )   Interpretation of Score: Each section is scored on a 0-5 scale, 5 representing the greatest disability. The scores of each section are added together for a total score of 50. Score 0 1-10 11-20 21-30 31-40 41-49 50   Modifier CH CI CJ CK CL CM CN           Medical Necessity:   · Patient is expected to demonstrate progress in strength and range of motion to improve his overall standing and walking times as well as ability to return to his prior activity level. Reason for Services/Other Comments:  · Patient continues to require skilled intervention due to ongoing lumbar pain noted with standing and walking as well as lifting. Use of outcome tool(s) and clinical judgement create a POC that gives a: Questionable prediction of patient's progress: MODERATE COMPLEXITY            TREATMENT:   (In addition to Assessment/Re-Assessment sessions the following treatments were rendered)  Pre-treatment Symptoms/Complaints:  Pt reporting feeling pain on Saturday after putting his daughter's bed together. It has taken several days for him to recover. Pain: Initial: Pain Intensity 1: 3  Post Session:  2/10     THERAPEUTIC EXERCISE: (25 minutes):  Exercises per grid below to improve mobility and strength. Required minimal verbal cues to promote proper body mechanics. Progressed resistance, range and repetitions as indicated. DKTC x 2 hold 15 sec-held  Treadmill walking 2. 5mph x 8 minutes  Prone hip flexor stretch with strap bilaterally 3x hold 30 sec  Supine HS stretching with strap bilaterally 3x hold 30 sec  Pelvic tilt x 20     Manual Therapy (  20 min   ):manual bilateral piriformis stretching and contract relax. Also PA's to lower back for improved pain control. Pt in prone.   In addition light STM to buttocks region with use of elbow for decrease in tightness  Therapeutic Modalities:  IFC e-stim x 15 minutes to lower back with pt in prone in order to reduce his overall pain. Pillow placed under hips to reduce extensive lordosis    HEP: continue as directed  Treatment/Session Assessment:    · Response to Treatment:  Pt reporting feeling better after treatment. He did have some spasm noted in left gluteal with deep pressure and was also sore with central PA's to lower lumbar spine. Pt continues to benefit overall from therapy to help reduce his pain with daily tasks. · Compliance with Program/Exercises: Will assess as treatment progresses. · Recommendations/Intent for next treatment session: \"Next visit will focus on advancements to more challenging activities\".   Total Treatment Duration: 60 min  PT Patient Time In/Time Out  Time In: 1130  Time Out: 1230  Treatment number 1100 [facility], PT

## 2017-11-09 ENCOUNTER — HOSPITAL ENCOUNTER (OUTPATIENT)
Dept: PHYSICAL THERAPY | Age: 37
Discharge: HOME | End: 2017-11-09
Payer: COMMERCIAL

## 2017-11-09 PROCEDURE — 97140 MANUAL THERAPY 1/> REGIONS: CPT

## 2017-11-09 PROCEDURE — 97110 THERAPEUTIC EXERCISES: CPT

## 2017-11-10 NOTE — PROGRESS NOTES
[facility]   [address], [address]  [phone]   [phone]    Jung Toledo Sr.  : 1980       OUTPATIENT PHYSICAL THERAPY:Daily Note 2017    ICD-10: Treatment Diagnosis: low back pain M54.5, radiculopathy lumbar region M54.16  Precautions/Allergies:   Penicillins and Sulfa (sulfonamide antibiotics)   Fall Risk Score: 1 (? 5 = High Risk)  MD Orders: Eval and treat MEDICAL/REFERRING DIAGNOSIS:  Radiculopathy, lumbar region [M54.16]  Other intervertebral disc degeneration, lumbar region [M51.36]   DATE OF ONSET: several months  REFERRING PHYSICIAN: Sy Serrano NP  RETURN PHYSICIAN APPOINTMENT: as needed     INITIAL ASSESSMENT:  Mr. Eliceo Avila presents with increased lower back pain over the past several months. He reports also having some prior history of lumbar pain. He noticed a couple of weeks ago that his pain increased and it felt burning in nature. He has had difficulty getting rid of it since then. 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 He does report lifting at work can increase his symptoms. He was diagnosed with DDD and referred to therapy to reduce his symptoms. PROBLEM LIST (Impacting functional limitations):  1. Decreased Strength  2. Decreased ADL/Functional Activities  3. Increased Pain  4. Decreased Flexibility/Joint Mobility INTERVENTIONS PLANNED:  1. Electrical Stimulation  2. Heat  3. Home Exercise Program (HEP)  4. Manual Therapy  5. Range of Motion (ROM)  6. Therapeutic Exercise/Strengthening  7. Ultrasound (US)   TREATMENT PLAN:  Effective Dates: 17 TO 17. Frequency/Duration: 2 times a week for 8 weeks and upon reassessment,  will adjust frequency and duration as progress indicates.       GOALS: (Goals have been discussed and agreed upon with patient.)  Short-Term Functional Goals: Time Frame: 4 weeks  1. Establish independent HEP with no cueing. 2. Pt will be able to report waking up with 4/10 pain rating or less. 3. Pt will be able to report sitting or standing for at least 45 minutes without increased pain. Discharge Goals: Time Frame: 8 weeks  1. Improve score on Oswestry by at least 5 points for improved ADL function. 2. Pt will be able to report working and performing his work functions without difficulty. 3. Pt will be able to return to prior activity level with little to no pain. Rehabilitation Potential For Stated Goals: Good  Regarding Melvi Stephane Andrade's therapy, I certify that the treatment plan above will be carried out by a therapist or under their direction. Thank you for this referral,  Vicki Campbell PT     Referring Physician Signature: Edilia Lesch, NP              Date                    The information in this section was collected on 11/1/17 (except where otherwise noted). HISTORY:   History of Present Injury/Illness (Reason for Referral):    Mr. Juan Jacobson, a 40year old male, presents with increased lower back pain over the past several months. He reports also having some prior history of lumbar pain. He noticed a couple of weeks ago that his pain increased and it felt burning in nature. He has had difficulty getting rid of it since then. 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 He does report lifting at work can increase his symptoms. He was diagnosed with DDD and referred to therapy to reduce his symptoms. Past Medical History/Comorbidities:   Mr. Juan Jacobson  has a past medical history of Arthritis; GERD (gastroesophageal reflux disease); HTN (hypertension); and Nausea & vomiting. He also has no past medical history of Difficult intubation; Malignant hyperthermia due to anesthesia; or Pseudocholinesterase deficiency.   Mr. Juan Jacobson  has a past surgical history that includes wisdom teeth extraction and other surgical.  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 autopap 5- 20 cm, Disp: , Rfl:     atorvastatin (LIPITOR) 20 mg tablet, Take 1 Tab by mouth daily. (Patient taking differently: Take 20 mg by mouth daily. Indications: hypercholesterolemia), Disp: 90 Tab, Rfl: 3    lisinopril-hydroCHLOROthiazide (PRINZIDE, ZESTORETIC) 20-25 mg per tablet, Take 1 Tab by mouth daily. , Disp: 90 Tab, Rfl: 3    amLODIPine (NORVASC) 5 mg tablet, Take 1 Tab by mouth daily. (Patient taking differently: Take 5 mg by mouth daily. Indications: hypertension), Disp: 90 Tab, Rfl: 3   Date Last Reviewed:  11/9/2017     Number of Personal Factors/Comorbidities that affect the Plan of Care: 1-2: MODERATE COMPLEXITY   EXAMINATION:   Palpation:          Mild pain noted at R PSIS and central lower back    ROM:        back ROM WFL-pain noted with FB  R hip ER 25 °  L hip IR 27 °              HS length good bilaterally. Poor hip ER bilaterally as well as decreased hip flexor flexibility                        Strength:        bilateral LE's WFL-  Slight decrease in core strength                  Special Tests: negative LLD, negative SLR  Neurological Screen: pt reports intermittent numbness in left sided toes 4 and 5  Balance:  good   Body Structures Involved:  1. Nerves  2. Bones  3. Joints  4. Muscles  5. Ligaments Body Functions Affected:  1. Sensory/Pain  2. Neuromusculoskeletal  3. Movement Related Activities and Participation Affected:  1. Mobility  2. Interpersonal Interactions and Relationships  3.  Community, Social and [place]   Number of elements (examined above) that affect the Plan of Care: 3: MODERATE COMPLEXITY   CLINICAL PRESENTATION:   Presentation: Stable and uncomplicated: LOW COMPLEXITY   CLINICAL DECISION MAKING:   Outcome Measure: Tool Used: Modified Oswestry Low Back Pain Questionnaire  Score:  Initial: 12/50  Most Recent: X/50 (Date: -- )   Interpretation of Score: Each section is scored on a 0-5 scale, 5 representing the greatest disability. The scores of each section are added together for a total score of 50. Score 0 1-10 11-20 21-30 31-40 41-49 50   Modifier CH CI CJ CK CL CM CN           Medical Necessity:   · Patient is expected to demonstrate progress in strength and range of motion to improve his overall standing and walking times as well as ability to return to his prior activity level. Reason for Services/Other Comments:  · Patient continues to require skilled intervention due to ongoing lumbar pain noted with standing and walking as well as lifting. Use of outcome tool(s) and clinical judgement create a POC that gives a: Questionable prediction of patient's progress: MODERATE COMPLEXITY            TREATMENT:   (In addition to Assessment/Re-Assessment sessions the following treatments were rendered)  Pre-treatment Symptoms/Complaints:  Pt reporting feeling no major changes. Pain: Initial: Pain Intensity 1: 3  Post Session:  2/10     THERAPEUTIC EXERCISE: (35 minutes):  Exercises per grid below to improve mobility and strength. Required minimal verbal cues to promote proper body mechanics. Progressed resistance, range and repetitions as indicated. DKTC x 2 hold 15 sec  Treadmill walking 2. 5mph x 8 minutes  Prone hip flexor stretch with strap bilaterally 3x hold 30 sec  Supine HS stretching with strap bilaterally 3x hold 30 sec-held  Pelvic tilt x 20   Abdominal bracing with bent knee falls outs x 30 sec bilaterally  Quadruped opposite arm and leg lifts x 25  Prone press up x 3 hold 10 sec  Prayer stretch 3x hold 20 sec  Manual Therapy (  10 min   ):manual bilateral piriformis stretching and contract relax. Also PA's to lower back for improved pain control. Pt in prone. In addition light STM to buttocks region with use of elbow for decrease in tightness  Therapeutic Modalities:  IFC e-stim x 15 minutes to lower back with pt in prone in order to reduce his overall pain. Pillow placed under hips to reduce extensive lordosis    HEP: continue as directed  Treatment/Session Assessment:    · Response to Treatment:  Pt reporting feeling no major changes. He continues to have tightness overall in the lower back. He was progressed to abdominal bracing exercises today and prayer stretch. Pt tends to feel better with flexion motions at times. He had no adverse effects from therapy today. · Compliance with Program/Exercises: Will assess as treatment progresses. · Recommendations/Intent for next treatment session: \"Next visit will focus on advancements to more challenging activities\".   Total Treatment Duration: 60 min  PT Patient Time In/Time Out  Time In: 8995  Time Out: 1235  Treatment number [id number] [facility] Conchis Manzo PT

## 2017-11-13 ENCOUNTER — HOSPITAL ENCOUNTER (OUTPATIENT)
Dept: PHYSICAL THERAPY | Age: 37
Discharge: HOME | End: 2017-11-13
Payer: COMMERCIAL

## 2017-11-13 PROCEDURE — 97140 MANUAL THERAPY 1/> REGIONS: CPT

## 2017-11-13 PROCEDURE — 97110 THERAPEUTIC EXERCISES: CPT

## 2017-11-13 NOTE — PROGRESS NOTES
Therapy Center at [facility]   [address], [address]  [phone]   [phone]    0 [facility]  : 1980       OUTPATIENT PHYSICAL THERAPY:Daily Note 2017    ICD-10: Treatment Diagnosis: low back pain M54.5, radiculopathy lumbar region M54.16  Precautions/Allergies:   Penicillins and Sulfa (sulfonamide antibiotics)   Fall Risk Score: 1 (? 5 = High Risk)  MD Orders: Eval and treat MEDICAL/REFERRING DIAGNOSIS:  Radiculopathy, lumbar region [M54.16]  Other intervertebral disc degeneration, lumbar region [M51.36]   DATE OF ONSET: several months  REFERRING PHYSICIAN: Britt Serrano Cap, NP  RETURN PHYSICIAN APPOINTMENT: as needed     INITIAL ASSESSMENT:  Mr. Hanna Palacio presents with increased lower back pain over the past several months. He reports also having some prior history of lumbar pain. He noticed a couple of weeks ago that his pain increased and it felt burning in nature. He has had difficulty getting rid of it since then. 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 He does report lifting at work can increase his symptoms. He was diagnosed with DDD and referred to therapy to reduce his symptoms. PROBLEM LIST (Impacting functional limitations):  1. Decreased Strength  2. Decreased ADL/Functional Activities  3. Increased Pain  4. Decreased Flexibility/Joint Mobility INTERVENTIONS PLANNED:  1. Electrical Stimulation  2. Heat  3. Home Exercise Program (HEP)  4. Manual Therapy  5. Range of Motion (ROM)  6. Therapeutic Exercise/Strengthening  7. Ultrasound (US)   TREATMENT PLAN:  Effective Dates: 17 TO 17. Frequency/Duration: 2 times a week for 8 weeks and upon reassessment,  will adjust frequency and duration as progress indicates.       GOALS: (Goals have been discussed and agreed upon with patient.)  Short-Term Functional Goals: Time Frame: 4 weeks  1. Establish independent HEP with no cueing. 2. Pt will be able to report waking up with 4/10 pain rating or less. 3. Pt will be able to report sitting or standing for at least 45 minutes without increased pain. Discharge Goals: Time Frame: 8 weeks  1. Improve score on Oswestry by at least 5 points for improved ADL function. 2. Pt will be able to report working and performing his work functions without difficulty. 3. Pt will be able to return to prior activity level with little to no pain. Rehabilitation Potential For Stated Goals: Good  Regarding Gera Jeronimo Sr.'s therapy, I certify that the treatment plan above will be carried out by a therapist or under their direction. Thank you for this referral,  Swati Cho PT     Referring Physician Signature: Estephania Delcid NP              Date                    The information in this section was collected on 11/1/17 (except where otherwise noted). HISTORY:   History of Present Injury/Illness (Reason for Referral):    Mr. Josep Harp, a 40year old male, presents with increased lower back pain over the past several months. He reports also having some prior history of lumbar pain. He noticed a couple of weeks ago that his pain increased and it felt burning in nature. He has had difficulty getting rid of it since then. 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 He does report lifting at work can increase his symptoms. He was diagnosed with DDD and referred to therapy to reduce his symptoms. Past Medical History/Comorbidities:   Mr. Josep Harp  has a past medical history of Arthritis; GERD (gastroesophageal reflux disease); HTN (hypertension); and Nausea & vomiting. He also has no past medical history of Difficult intubation; Malignant hyperthermia due to anesthesia; or Pseudocholinesterase deficiency.   Mr. Josep Harp  has a past surgical history that includes wisdom teeth extraction and other surgical.  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 autopap 5- 20 cm, Disp: , Rfl:     atorvastatin (LIPITOR) 20 mg tablet, Take 1 Tab by mouth daily. (Patient taking differently: Take 20 mg by mouth daily. Indications: hypercholesterolemia), Disp: 90 Tab, Rfl: 3    lisinopril-hydroCHLOROthiazide (PRINZIDE, ZESTORETIC) 20-25 mg per tablet, Take 1 Tab by mouth daily. , Disp: 90 Tab, Rfl: 3    amLODIPine (NORVASC) 5 mg tablet, Take 1 Tab by mouth daily. (Patient taking differently: Take 5 mg by mouth daily. Indications: hypertension), Disp: 90 Tab, Rfl: 3   Date Last Reviewed:  11/13/2017     Number of Personal Factors/Comorbidities that affect the Plan of Care: 1-2: MODERATE COMPLEXITY   EXAMINATION:   Palpation:          Mild pain noted at R PSIS and central lower back    ROM:        back ROM WFL-pain noted with FB  R hip ER 25 °  L hip IR 27 °              HS length good bilaterally. Poor hip ER bilaterally as well as decreased hip flexor flexibility                        Strength:        bilateral LE's WFL-  Slight decrease in core strength                  Special Tests: negative LLD, negative SLR  Neurological Screen: pt reports intermittent numbness in left sided toes 4 and 5  Balance:  good   Body Structures Involved:  1. Nerves  2. Bones  3. Joints  4. Muscles  5. Ligaments Body Functions Affected:  1. Sensory/Pain  2. Neuromusculoskeletal  3. Movement Related Activities and Participation Affected:  1. Mobility  2. Interpersonal Interactions and Relationships  3.  Community, Social and [place]   Number of elements (examined above) that affect the Plan of Care: 3: MODERATE COMPLEXITY CLINICAL PRESENTATION:   Presentation: Stable and uncomplicated: LOW COMPLEXITY   CLINICAL DECISION MAKING:   Outcome Measure: Tool Used: Modified Oswestry Low Back Pain Questionnaire  Score:  Initial: 12/50  Most Recent: X/50 (Date: -- )   Interpretation of Score: Each section is scored on a 0-5 scale, 5 representing the greatest disability. The scores of each section are added together for a total score of 50. Score 0 1-10 11-20 21-30 31-40 41-49 50   Modifier CH CI CJ CK CL CM CN           Medical Necessity:   · Patient is expected to demonstrate progress in strength and range of motion to improve his overall standing and walking times as well as ability to return to his prior activity level. Reason for Services/Other Comments:  · Patient continues to require skilled intervention due to ongoing lumbar pain noted with standing and walking as well as lifting. Use of outcome tool(s) and clinical judgement create a POC that gives a: Questionable prediction of patient's progress: MODERATE COMPLEXITY            TREATMENT:   (In addition to Assessment/Re-Assessment sessions the following treatments were rendered)  Pre-treatment Symptoms/Complaints:  Pt reporting feeling some soreness over the weekend, but he feels good today. Pain: Initial: Pain Intensity 1: 1  Post Session:  1/10     THERAPEUTIC EXERCISE: (35 minutes):  Exercises per grid below to improve mobility and strength. Required minimal verbal cues to promote proper body mechanics. Progressed resistance, range and repetitions as indicated. DKTC x 2 hold 15 sec  Treadmill walking 2. 5mph x 8 minutes  Prone hip flexor stretch with strap bilaterally 3x hold 30 sec  Supine HS stretching with strap bilaterally 3x hold 30 sec-held  Pelvic tilt x 20   Abdominal bracing with bent knee falls outs x 30 sec bilaterally  Quadruped opposite arm and leg lifts x 25  Prone press up x 3 hold 10 sec  Prayer stretch 3x hold 20 sec  Manual Therapy (  10 min ):manual bilateral piriformis stretching and contract relax. Also PA's to lower back for improved pain control. Pt in prone. In addition light STM to buttocks region with use of elbow for decrease in tightness  Therapeutic Modalities:  IFC e-stim x 15 minutes to lower back with pt in prone in order to reduce his overall pain. Pillow placed under hips to reduce extensive lordosis    HEP: continue as directed  Treatment/Session Assessment:    · Response to Treatment:  Pt reporting feeling somewhat better. He did have some pain over the weekend, but he feels better today. He continues to have tightenss noted in the lower back overall and has continued glut and hip flexor tightness. Pt did mention that he had a brief, but intense pain down the leg over the weekend. Traction may be a possibility for him if this type of pain continues. · Compliance with Program/Exercises: Will assess as treatment progresses. · Recommendations/Intent for next treatment session: \"Next visit will focus on advancements to more challenging activities\".   Total Treatment Duration: 60 min  PT Patient Time In/Time Out  Time In: 1130  Time Out: 1230  Treatment number [address] Aleena Gustafson, PT

## 2017-11-16 ENCOUNTER — HOSPITAL ENCOUNTER (OUTPATIENT)
Dept: PHYSICAL THERAPY | Age: 37
Discharge: HOME | End: 2017-11-16
Payer: COMMERCIAL

## 2017-11-16 NOTE — PROGRESS NOTES
Griffin Boyd.  : 1980  Primary: [address]  Secondary:  [address] at [facility] Therapy  [address], [address]  [phone]   [phone]      OUTPATIENT DAILY NOTE    NAME/AGE/GENDER: Griffin Boyd. is a 40 y.o. male. DATE: 2017    Patient cancelled  appointment today due to schedule conflicit. Will plan to follow up on next scheduled visit.     Iesha العراقي, PTA

## 2017-11-20 ENCOUNTER — HOSPITAL ENCOUNTER (OUTPATIENT)
Dept: PHYSICAL THERAPY | Age: 37
Discharge: HOME | End: 2017-11-20
Payer: COMMERCIAL

## 2017-11-20 PROCEDURE — 97140 MANUAL THERAPY 1/> REGIONS: CPT

## 2017-11-20 PROCEDURE — 97110 THERAPEUTIC EXERCISES: CPT

## 2017-11-21 NOTE — PROGRESS NOTES
Therapy Center at [place] [address] Therapy   [address], [address]  [phone]   [phone] [facility]  : 1980       OUTPATIENT PHYSICAL THERAPY:Daily Note 2017    ICD-10: Treatment Diagnosis: low back pain M54.5, radiculopathy lumbar region M54.16  Precautions/Allergies:   Penicillins and Sulfa (sulfonamide antibiotics)   Fall Risk Score: 1 (? 5 = High Risk)  MD Orders: Eval and treat MEDICAL/REFERRING DIAGNOSIS:  Radiculopathy, lumbar region [M54.16]  Other intervertebral disc degeneration, lumbar region [M51.36]   DATE OF ONSET: several months  REFERRING PHYSICIAN: Marlon Serrano NP  RETURN PHYSICIAN APPOINTMENT: as needed     INITIAL ASSESSMENT:  Mr. Mora Dockery presents with increased lower back pain over the past several months. He reports also having some prior history of lumbar pain. He noticed a couple of weeks ago that his pain increased and it felt burning in nature. He has had difficulty getting rid of it since then. 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 He does report lifting at work can increase his symptoms. He was diagnosed with DDD and referred to therapy to reduce his symptoms. PROBLEM LIST (Impacting functional limitations):  1. Decreased Strength  2. Decreased ADL/Functional Activities  3. Increased Pain  4. Decreased Flexibility/Joint Mobility INTERVENTIONS PLANNED:  1. Electrical Stimulation  2. Heat  3. Home Exercise Program (HEP)  4. Manual Therapy  5. Range of Motion (ROM)  6. Therapeutic Exercise/Strengthening  7. Ultrasound (US)   TREATMENT PLAN:  Effective Dates: 17 TO 17. Frequency/Duration: 2 times a week for 8 weeks and upon reassessment,  will adjust frequency and duration as progress indicates.       GOALS: (Goals have been discussed and agreed upon with patient.)  Short-Term Functional Goals: Time Frame: 4 weeks  1. Establish independent HEP with no cueing. 2. Pt will be able to report waking up with 4/10 pain rating or less. 3. Pt will be able to report sitting or standing for at least 45 minutes without increased pain. Discharge Goals: Time Frame: 8 weeks  1. Improve score on Oswestry by at least 5 points for improved ADL function. 2. Pt will be able to report working and performing his work functions without difficulty. 3. Pt will be able to return to prior activity level with little to no pain. Rehabilitation Potential For Stated Goals: Good  Regarding Tempie Given Sr.'s therapy, I certify that the treatment plan above will be carried out by a therapist or under their direction. Thank you for this referral,  Nena Lake PT                 The information in this section was collected on 11/1/17 (except where otherwise noted). HISTORY:   History of Present Injury/Illness (Reason for Referral):    Mr. Maryann Kent, a 40year old male, presents with increased lower back pain over the past several months. He reports also having some prior history of lumbar pain. He noticed a couple of weeks ago that his pain increased and it felt burning in nature. He has had difficulty getting rid of it since then. 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 He does report lifting at work can increase his symptoms. He was diagnosed with DDD and referred to therapy to reduce his symptoms. Past Medical History/Comorbidities:   Mr. Maryann Kent  has a past medical history of Arthritis; GERD (gastroesophageal reflux disease); HTN (hypertension); and Nausea & vomiting. He also has no past medical history of Difficult intubation; Malignant hyperthermia due to anesthesia; or Pseudocholinesterase deficiency.   Mr. Maryann Kent  has a past surgical history that includes wisdom teeth extraction and other surgical.  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 autopap 5- 20 cm, Disp: , Rfl:     atorvastatin (LIPITOR) 20 mg tablet, Take 1 Tab by mouth daily. (Patient taking differently: Take 20 mg by mouth daily. Indications: hypercholesterolemia), Disp: 90 Tab, Rfl: 3    lisinopril-hydroCHLOROthiazide (PRINZIDE, ZESTORETIC) 20-25 mg per tablet, Take 1 Tab by mouth daily. , Disp: 90 Tab, Rfl: 3    amLODIPine (NORVASC) 5 mg tablet, Take 1 Tab by mouth daily. (Patient taking differently: Take 5 mg by mouth daily. Indications: hypertension), Disp: 90 Tab, Rfl: 3   Date Last Reviewed:  11/20/2017     Number of Personal Factors/Comorbidities that affect the Plan of Care: 1-2: MODERATE COMPLEXITY   EXAMINATION:   Palpation:          Mild pain noted at R PSIS and central lower back    ROM:        back ROM WFL-pain noted with FB  R hip ER 25 °  L hip IR 27 °              HS length good bilaterally. Poor hip ER bilaterally as well as decreased hip flexor flexibility                        Strength:        bilateral LE's WFL-  Slight decrease in core strength                  Special Tests: negative LLD, negative SLR  Neurological Screen: pt reports intermittent numbness in left sided toes 4 and 5  Balance:  good   Body Structures Involved:  1. Nerves  2. Bones  3. Joints  4. Muscles  5. Ligaments Body Functions Affected:  1. Sensory/Pain  2. Neuromusculoskeletal  3. Movement Related Activities and Participation Affected:  1. Mobility  2. Interpersonal Interactions and Relationships  3.  Community, Social and [place]   Number of elements (examined above) that affect the Plan of Care: 3: MODERATE COMPLEXITY   CLINICAL PRESENTATION:   Presentation: Stable and uncomplicated: LOW COMPLEXITY   CLINICAL DECISION MAKING:   Outcome Measure: Tool Used: Modified Oswestry Low Back Pain Questionnaire  Score:  Initial: 12/50  Most Recent: X/50 (Date: -- )   Interpretation of Score: Each section is scored on a 0-5 scale, 5 representing the greatest disability. The scores of each section are added together for a total score of 50. Score 0 1-10 11-20 21-30 31-40 41-49 50   Modifier CH CI CJ CK CL CM CN           Medical Necessity:   · Patient is expected to demonstrate progress in strength and range of motion to improve his overall standing and walking times as well as ability to return to his prior activity level. Reason for Services/Other Comments:  · Patient continues to require skilled intervention due to ongoing lumbar pain noted with standing and walking as well as lifting. Use of outcome tool(s) and clinical judgement create a POC that gives a: Questionable prediction of patient's progress: MODERATE COMPLEXITY            TREATMENT:   (In addition to Assessment/Re-Assessment sessions the following treatments were rendered)  Pre-treatment Symptoms/Complaints:  Pt reporting feeling increased pain last week due to digging in his yard for some plumbing issues. He was in pain for three days, but is now finally feeling better. Pain: Initial: Pain Intensity 1: 4  Post Session:  3/10     THERAPEUTIC EXERCISE: (25 minutes):  Exercises per grid below to improve mobility and strength. Required minimal verbal cues to promote proper body mechanics. Progressed resistance, range and repetitions as indicated. DKTC x 2 hold 15 sec-held  Treadmill walking 2. 5mph x 8 minutes-held  Prone hip flexor stretch with strap bilaterally 3x hold 30 sec-held  Supine HS stretching with strap bilaterally 3x hold 30 sec-held  Pelvic tilt x 20   Abdominal bracing with bent knee falls outs x 30 sec bilaterally-held  Quadruped opposite arm and leg lifts x 20  Prone press up x 3 hold 10 sec-held  Prayer stretch 3x hold 20 sec-held  Bridging x 20  Manual Therapy (  10 min   ):manual bilateral piriformis stretching and contract relax. Also PA's to lower back for improved pain control. Pt in prone. In addition light STM to buttocks region with use of elbow for decrease in tightness  Therapeutic Modalities:  IFC e-stim x 15 minutes to lower back with pt in prone in order to reduce his overall pain. Pillow placed under hips to reduce extensive lordosis  Mechanical lumbar traction at beginning of treatment x 15 minutes at 140# followed by exercises and then e-stim. HEP: continue as directed  Treatment/Session Assessment:    · Response to Treatment:  Pt reporting feeling better post treatment today. He reported having lumbar pain and radicular symptoms as he was digging and even afterwards. He reported having to take pain medicine and rest the area. He is slowly improving. Today we added traction for radicular symptoms followed by light exercises and e-stim. · Compliance with Program/Exercises: Will assess as treatment progresses. · Recommendations/Intent for next treatment session: \"Next visit will focus on advancements to more challenging activities\".   Total Treatment Duration: 65 min  PT Patient Time In/Time Out  Time In: 1130  Time Out: 1235  Treatment number [address], PT

## 2017-11-27 ENCOUNTER — HOSPITAL ENCOUNTER (OUTPATIENT)
Dept: PHYSICAL THERAPY | Age: 37
Discharge: HOME | End: 2017-11-27
Payer: COMMERCIAL

## 2017-11-27 PROCEDURE — 97012 MECHANICAL TRACTION THERAPY: CPT

## 2017-11-27 PROCEDURE — 97110 THERAPEUTIC EXERCISES: CPT

## 2017-11-28 NOTE — PROGRESS NOTES
[facility] at [facility]   [address], [address]  [phone]   [phone]    0 [facility]  : 1980       OUTPATIENT PHYSICAL THERAPY:Daily Note 2017    ICD-10: Treatment Diagnosis: low back pain M54.5, radiculopathy lumbar region M54.16  Precautions/Allergies:   Penicillins and Sulfa (sulfonamide antibiotics)   Fall Risk Score: 1 (? 5 = High Risk)  MD Orders: Eval and treat MEDICAL/REFERRING DIAGNOSIS:  Radiculopathy, lumbar region [M54.16]  Other intervertebral disc degeneration, lumbar region [M51.36]   DATE OF ONSET: several months  REFERRING PHYSICIAN: Margarita Serrano NP  RETURN PHYSICIAN APPOINTMENT: as needed     INITIAL ASSESSMENT:  Mr. Maryann Kent presents with increased lower back pain over the past several months. He reports also having some prior history of lumbar pain. He noticed a couple of weeks ago that his pain increased and it felt burning in nature. He has had difficulty getting rid of it since then. 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 He does report lifting at work can increase his symptoms. He was diagnosed with DDD and referred to therapy to reduce his symptoms. PROBLEM LIST (Impacting functional limitations):  1. Decreased Strength  2. Decreased ADL/Functional Activities  3. Increased Pain  4. Decreased Flexibility/Joint Mobility INTERVENTIONS PLANNED:  1. Electrical Stimulation  2. Heat  3. Home Exercise Program (HEP)  4. Manual Therapy  5. Range of Motion (ROM)  6. Therapeutic Exercise/Strengthening  7. Ultrasound (US)   TREATMENT PLAN:  Effective Dates: 17 TO 17. Frequency/Duration: 2 times a week for 8 weeks and upon reassessment,  will adjust frequency and duration as progress indicates.       GOALS: (Goals have been discussed and agreed upon with patient.)  Short-Term Functional Goals: Time Frame: 4 weeks  1. Establish independent HEP with no cueing. 2. Pt will be able to report waking up with 4/10 pain rating or less. 3. Pt will be able to report sitting or standing for at least 45 minutes without increased pain. Discharge Goals: Time Frame: 8 weeks  1. Improve score on Oswestry by at least 5 points for improved ADL function. 2. Pt will be able to report working and performing his work functions without difficulty. 3. Pt will be able to return to prior activity level with little to no pain. Rehabilitation Potential For Stated Goals: Good  Regarding Whit Serna Sr.'s therapy, I certify that the treatment plan above will be carried out by a therapist or under their direction. Thank you for this referral,  Sonya Todd, PT                 The information in this section was collected on 11/1/17 (except where otherwise noted). HISTORY:   History of Present Injury/Illness (Reason for Referral):    Mr. Zambrano, a 40year old male, presents with increased lower back pain over the past several months. He reports also having some prior history of lumbar pain. He noticed a couple of weeks ago that his pain increased and it felt burning in nature. He has had difficulty getting rid of it since then. 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 He does report lifting at work can increase his symptoms. He was diagnosed with DDD and referred to therapy to reduce his symptoms. Past Medical History/Comorbidities:   Mr. Zambrano  has a past medical history of Arthritis; GERD (gastroesophageal reflux disease); HTN (hypertension); and Nausea & vomiting. He also has no past medical history of Difficult intubation; Malignant hyperthermia due to anesthesia; or Pseudocholinesterase deficiency.   Mr. Zambrano  has a past surgical history that includes wisdom teeth extraction and other surgical.  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 autopap 5- 20 cm, Disp: , Rfl:     atorvastatin (LIPITOR) 20 mg tablet, Take 1 Tab by mouth daily. (Patient taking differently: Take 20 mg by mouth daily. Indications: hypercholesterolemia), Disp: 90 Tab, Rfl: 3    lisinopril-hydroCHLOROthiazide (PRINZIDE, ZESTORETIC) 20-25 mg per tablet, Take 1 Tab by mouth daily. , Disp: 90 Tab, Rfl: 3    amLODIPine (NORVASC) 5 mg tablet, Take 1 Tab by mouth daily. (Patient taking differently: Take 5 mg by mouth daily. Indications: hypertension), Disp: 90 Tab, Rfl: 3   Date Last Reviewed:  11/27/2017     Number of Personal Factors/Comorbidities that affect the Plan of Care: 1-2: MODERATE COMPLEXITY   EXAMINATION:   Palpation:          Mild pain noted at R PSIS and central lower back    ROM:        back ROM WFL-pain noted with FB  R hip ER 25 °  L hip IR 27 °              HS length good bilaterally. Poor hip ER bilaterally as well as decreased hip flexor flexibility                        Strength:        bilateral LE's WFL-  Slight decrease in core strength                  Special Tests: negative LLD, negative SLR  Neurological Screen: pt reports intermittent numbness in left sided toes 4 and 5  Balance:  good   Body Structures Involved:  1. Nerves  2. Bones  3. Joints  4. Muscles  5. Ligaments Body Functions Affected:  1. Sensory/Pain  2. Neuromusculoskeletal  3. Movement Related Activities and Participation Affected:  1. Mobility  2. Interpersonal Interactions and Relationships  3.  Community, Social and [place]   Number of elements (examined above) that affect the Plan of Care: 3: MODERATE COMPLEXITY   CLINICAL PRESENTATION:   Presentation: Stable and uncomplicated: LOW COMPLEXITY   CLINICAL DECISION MAKING:   Outcome Measure: Tool Used: Modified Oswestry Low Back Pain Questionnaire  Score:  Initial: 12/50  Most Recent: X/50 (Date: -- )   Interpretation of Score: Each section is scored on a 0-5 scale, 5 representing the greatest disability. The scores of each section are added together for a total score of 50. Score 0 1-10 11-20 21-30 31-40 41-49 50   Modifier CH CI CJ CK CL CM CN           Medical Necessity:   · Patient is expected to demonstrate progress in strength and range of motion to improve his overall standing and walking times as well as ability to return to his prior activity level. Reason for Services/Other Comments:  · Patient continues to require skilled intervention due to ongoing lumbar pain noted with standing and walking as well as lifting. Use of outcome tool(s) and clinical judgement create a POC that gives a: Questionable prediction of patient's progress: MODERATE COMPLEXITY            TREATMENT:   (In addition to Assessment/Re-Assessment sessions the following treatments were rendered)  Pre-treatment Symptoms/Complaints:  Pt reporting feeling increased pain on Friday as he went shopping and felt increased pain in his lower back as well as some symptoms down his right leg. Pain: Initial: Pain Intensity 1: 4  Post Session:  3/10     THERAPEUTIC EXERCISE: (35 minutes):  Exercises per grid below to improve mobility and strength. Required minimal verbal cues to promote proper body mechanics. Progressed resistance, range and repetitions as indicated. DKTC x 2 hold 15 sec  Treadmill walking 2. 5mph x 8 minutes-held  Prone hip flexor stretch with strap bilaterally 3x hold 30 sec-held  Supine HS stretching with strap bilaterally 3x hold 30 sec-held  Pelvic tilt x 20   Abdominal bracing with bent knee falls outs x 30 sec bilaterally-  Quadruped opposite arm and leg lifts x 20  Prone press up x 3 hold 10 sec-  Prayer stretch 3x hold 20 sec-held  Bridging x 20  Manual Therapy (  0 min   ):na  Therapeutic Modalities:  IFC e-stim x 15 minutes to lower back with pt in prone in order to reduce his overall pain. Pillow placed under hips to reduce extensive lordosis  Mechanical lumbar traction at beginning of treatment x 15 minutes at 140# followed by exercises and then e-stim. HEP: continue as directed  Treatment/Session Assessment:    · Response to Treatment:  Pt reporting feeling as though the traction helped him the last time. He has had continued intermittent radicular symptoms noted with increased activity like standing and walking. Pt will see MD next week and we will continue with current plan until he sees MD.  · Compliance with Program/Exercises: Will assess as treatment progresses. · Recommendations/Intent for next treatment session: \"Next visit will focus on advancements to more challenging activities\".   Total Treatment Duration: 65 min  PT Patient Time In/Time Out  Time In: 1130  Time Out: 1235  Treatment number Marlen [id number], PT

## 2017-11-30 ENCOUNTER — HOSPITAL ENCOUNTER (OUTPATIENT)
Dept: PHYSICAL THERAPY | Age: 37
Discharge: HOME | End: 2017-11-30
Payer: COMMERCIAL

## 2017-11-30 PROCEDURE — 97110 THERAPEUTIC EXERCISES: CPT

## 2017-11-30 PROCEDURE — 97012 MECHANICAL TRACTION THERAPY: CPT

## 2017-11-30 PROCEDURE — 97140 MANUAL THERAPY 1/> REGIONS: CPT

## 2017-12-01 NOTE — PROGRESS NOTES
[facility]rapy [facility] at [facility]   [address], [address]  [phone]   [phone]    Zuleika Alie Hubbard.  : 1980       OUTPATIENT PHYSICAL THERAPY:Daily Note and Progress Report 2017    ICD-10: Treatment Diagnosis: low back pain M54.5, radiculopathy lumbar region M54.16  Precautions/Allergies:   Penicillins and Sulfa (sulfonamide antibiotics)   Fall Risk Score: 1 (? 5 = High Risk)  MD Orders: Eval and treat MEDICAL/REFERRING DIAGNOSIS:  Radiculopathy, lumbar region [M54.16]  Other intervertebral disc degeneration, lumbar region [M51.36]   DATE OF ONSET: several months  REFERRING PHYSICIAN: Benny Serrano NP  RETURN PHYSICIAN APPOINTMENT: as needed     INITIAL ASSESSMENT:  Mr. Samuel Ham presents with increased lower back pain over the past several months. He reports also having some prior history of lumbar pain. He noticed a couple of weeks ago that his pain increased and it felt burning in nature. He has had difficulty getting rid of it since then. 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 He does report lifting at work can increase his symptoms. He was diagnosed with DDD and referred to therapy to reduce his symptoms. PROBLEM LIST (Impacting functional limitations):  1. Decreased Strength  2. Decreased ADL/Functional Activities  3. Increased Pain  4. Decreased Flexibility/Joint Mobility INTERVENTIONS PLANNED:  1. Electrical Stimulation  2. Heat  3. Home Exercise Program (HEP)  4. Manual Therapy  5. Range of Motion (ROM)  6. Therapeutic Exercise/Strengthening  7. Ultrasound (US)   TREATMENT PLAN:  Effective Dates: 17 TO 17. Frequency/Duration: 2 times a week for 8 weeks and upon reassessment,  will adjust frequency and duration as progress indicates.       GOALS: (Goals have been discussed and agreed upon with patient.)  Short-Term Functional Goals: Time Frame: 4 weeks  1. Establish independent HEP with no cueing.-met  2. Pt will be able to report waking up with 4/10 pain rating or less. -meeting  3. Pt will be able to report sitting or standing for at least 45 minutes without increased pain.-intermittently meeting  Discharge Goals: Time Frame: 8 weeks  1. Improve score on Oswestry by at least 5 points for improved ADL function.-not met  2. Pt will be able to report working and performing his work functions without difficulty.-in progress  3. Pt will be able to return to prior activity level with little to no pain.-in progress  Rehabilitation Potential For Stated Goals: Good  Regarding Gita Garcia Sr.'s therapy, I certify that the treatment plan above will be carried out by a therapist or under their direction. Thank you for this referral,  Dontrell Valentine PT                 The information in this section was collected on 11/1/17 (except where otherwise noted). HISTORY:   History of Present Injury/Illness (Reason for Referral):    Mr. Antonia Felix, a 40year old male, presents with increased lower back pain over the past several months. He reports also having some prior history of lumbar pain. He noticed a couple of weeks ago that his pain increased and it felt burning in nature. He has had difficulty getting rid of it since then. 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 He does report lifting at work can increase his symptoms. He was diagnosed with DDD and referred to therapy to reduce his symptoms. Past Medical History/Comorbidities:   Mr. Antonia Felix  has a past medical history of Arthritis; GERD (gastroesophageal reflux disease); HTN (hypertension); and Nausea & vomiting. He also has no past medical history of Difficult intubation; Malignant hyperthermia due to anesthesia; or Pseudocholinesterase deficiency.   Mr. Antonia Felix  has a past surgical history that includes wisdom teeth extraction and other surgical.  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 autopap 5- 20 cm, Disp: , Rfl:     atorvastatin (LIPITOR) 20 mg tablet, Take 1 Tab by mouth daily. (Patient taking differently: Take 20 mg by mouth daily. Indications: hypercholesterolemia), Disp: 90 Tab, Rfl: 3    lisinopril-hydroCHLOROthiazide (PRINZIDE, ZESTORETIC) 20-25 mg per tablet, Take 1 Tab by mouth daily. , Disp: 90 Tab, Rfl: 3    amLODIPine (NORVASC) 5 mg tablet, Take 1 Tab by mouth daily. (Patient taking differently: Take 5 mg by mouth daily. Indications: hypertension), Disp: 90 Tab, Rfl: 3   Date Last Reviewed:  11/30/2017     Number of Personal Factors/Comorbidities that affect the Plan of Care: 1-2: MODERATE COMPLEXITY   EXAMINATION:   Palpation:          Mild pain noted at R PSIS and central lower back    ROM:        back ROM WFL-pain noted with FB  R hip ER 25 °  L hip IR 27 °              HS length good bilaterally. Poor hip ER bilaterally as well as decreased hip flexor flexibility                        Strength:        bilateral LE's WFL-  Slight decrease in core strength                  Special Tests: negative LLD, negative SLR  Neurological Screen: pt reports intermittent numbness in left sided toes 4 and 5  Balance:  good   Body Structures Involved:  1. Nerves  2. Bones  3. Joints  4. Muscles  5. Ligaments Body Functions Affected:  1. Sensory/Pain  2. Neuromusculoskeletal  3. Movement Related Activities and Participation Affected:  1. Mobility  2. Interpersonal Interactions and Relationships  3.  Community, Social and [place]   Number of elements (examined above) that affect the Plan of Care: 3: MODERATE COMPLEXITY CLINICAL PRESENTATION:   Presentation: Stable and uncomplicated: LOW COMPLEXITY   CLINICAL DECISION MAKING:   Outcome Measure: Tool Used: Modified Oswestry Low Back Pain Questionnaire  Score:  Initial: 12/50  Most Recent: 12/50 (Date: 11/30/17 )   Interpretation of Score: Each section is scored on a 0-5 scale, 5 representing the greatest disability. The scores of each section are added together for a total score of 50. Score 0 1-10 11-20 21-30 31-40 41-49 50   Modifier CH CI CJ CK CL CM CN           Medical Necessity:   · Patient is expected to demonstrate progress in strength and range of motion to improve his overall standing and walking times as well as ability to return to his prior activity level. Reason for Services/Other Comments:  · Patient continues to require skilled intervention due to ongoing lumbar pain noted with standing and walking as well as lifting. Use of outcome tool(s) and clinical judgement create a POC that gives a: Questionable prediction of patient's progress: MODERATE COMPLEXITY            TREATMENT:   (In addition to Assessment/Re-Assessment sessions the following treatments were rendered)  Pre-treatment Symptoms/Complaints:  Pt reporting feeling better. He is avoiding any heavy activities. Pain: Initial: Pain Intensity 1: 3  Post Session:  3/10     THERAPEUTIC EXERCISE: (35 minutes):  Exercises per grid below to improve mobility and strength. Required minimal verbal cues to promote proper body mechanics. Progressed resistance, range and repetitions as indicated. DKTC x 2 hold 15 sec  Treadmill walking 2. 5mph x 8 minutes-held  Prone hip flexor stretch with strap bilaterally 3x hold 30 sec-held  Supine HS stretching with strap bilaterally 3x hold 30 sec-held  Pelvic tilt x 20   Abdominal bracing with bent knee falls outs x 30 sec bilaterally-  Quadruped opposite arm and leg lifts x 20  Prone press up x 5 hold 10 sec-  Prayer stretch 3x hold 20 se  Bridging x 20  Trunk rotation 45# x 20 to bilateral sides  Manual Therapy ( 15 min   ):STM and central PA's to lower back for pain control  Therapeutic Modalities:  Exercises performed followed by Mechanical lumbar traction at 140# x 15 minutes and then IFC e-stim x 15 minutes to lower back with pt in prone in order to reduce his overall pain. HEP: continue as directed  Treatment/Session Assessment:    · Response to Treatment:  Pt reporting feeling jb. He has been avoiding any heavy or strenuous work as this increased  His pain for several days. He is meeting several of his goals set at evaluation, but continues to progress towards meeting his LTG's. Pt will see MD next week for re-check. · Compliance with Program/Exercises: Will assess as treatment progresses. · Recommendations/Intent for next treatment session: \"Next visit will focus on advancements to more challenging activities\".   Total Treatment Duration: 65 min  PT Patient Time In/Time Out  Time In: 1130  Time Out: 1235  Treatment number Darron Lona Walker, WINSOME

## 2018-01-03 NOTE — PROGRESS NOTES
[facility] at [place] [facility] [address] [facility]   [address] [place] [address], [address]  [phone]   [phone]    Estephania Jain .  : 1980       OUTPATIENT PHYSICAL THERAPY:Discontinuation Summary 1/3/2018    ICD-10: Treatment Diagnosis: low back pain M54.5, radiculopathy lumbar region M54.16  Precautions/Allergies:   Penicillins and Sulfa (sulfonamide antibiotics)   Fall Risk Score: 1 (? 5 = High Risk)  MD Orders: Eval and treat MEDICAL/REFERRING DIAGNOSIS:  Radiculopathy, lumbar region [M54.16]  Other intervertebral disc degeneration, lumbar region [M51.36]   DATE OF ONSET: several months  REFERRING PHYSICIAN: Jonh Serrano NP  RETURN PHYSICIAN APPOINTMENT: as needed     INITIAL ASSESSMENT:  Mr. Zambrano presents with increased lower back pain over the past several months. He reports also having some prior history of lumbar pain. He noticed a couple of weeks ago that his pain increased and it felt burning in nature. He has had difficulty getting rid of it since then. 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 He does report lifting at work can increase his symptoms. He was diagnosed with DDD and referred to therapy to reduce his symptoms. PROBLEM LIST (Impacting functional limitations):  1. Decreased Strength  2. Decreased ADL/Functional Activities  3. Increased Pain  4. Decreased Flexibility/Joint Mobility INTERVENTIONS PLANNED:  1. Electrical Stimulation  2. Heat  3. Home Exercise Program (HEP)  4. Manual Therapy  5. Range of Motion (ROM)  6. Therapeutic Exercise/Strengthening  7. Ultrasound (US)   TREATMENT PLAN:  Effective Dates: 17 TO 17. Frequency/Duration: 2 times a week for 8 weeks and upon reassessment,  will adjust frequency and duration as progress indicates.       GOALS: (Goals have been discussed and agreed upon with patient.)  Short-Term Functional Goals: Time Frame: 4 weeks  1. Establish independent HEP with no cueing.-met  2. Pt will be able to report waking up with 4/10 pain rating or less. -meeting  3. Pt will be able to report sitting or standing for at least 45 minutes without increased pain.-intermittently meeting  Discharge Goals: Time Frame: 8 weeks  1. Improve score on Oswestry by at least 5 points for improved ADL function.-not met  2. Pt will be able to report working and performing his work functions without difficulty.-in progress  3. Pt will be able to return to prior activity level with little to no pain.-in progress  Rehabilitation Potential For Stated Goals: Good  Regarding Ludmila Thorpe Sr.'s therapy, I certify that the treatment plan above will be carried out by a therapist or under their direction. Thank you for this referral,  Sheldon Rabago, PT                 Pratima Motley has been seen in physical therapy from 11/1/17 to 11/30/17 for 8 visits. Treatment has been discontinued at this time due to patient failing to return for additional treatment. He was improving, but stated heavy activities would increase his pain. He met his STG's and was progressing towards his LTG's. Pt also reported at last visit he was scheduled to return to referring MD for re-check. Pt has not returned since that appointment.     Thank you for this referral.     Blas Pro DPT

## 2018-03-02 ENCOUNTER — HOSPITAL ENCOUNTER (OUTPATIENT)
Dept: SURGERY | Age: 38
Discharge: HOME | End: 2018-03-02
Payer: COMMERCIAL

## 2018-03-02 VITALS
HEART RATE: 82 BPM | RESPIRATION RATE: 16 BRPM | TEMPERATURE: 98 F | WEIGHT: 279 LBS | DIASTOLIC BLOOD PRESSURE: 87 MMHG | SYSTOLIC BLOOD PRESSURE: 147 MMHG | BODY MASS INDEX: 37.79 KG/M2 | HEIGHT: 72 IN | OXYGEN SATURATION: 97 %

## 2018-03-02 LAB
BACTERIA SPEC CULT: NORMAL
POTASSIUM SERPL-SCNC: 3.8 MMOL/L (ref 3.5–5.1)
SERVICE CMNT-IMP: NORMAL

## 2018-03-02 PROCEDURE — 84132 ASSAY OF SERUM POTASSIUM: CPT | Performed by: ANESTHESIOLOGY

## 2018-03-02 PROCEDURE — 87641 MR-STAPH DNA AMP PROBE: CPT | Performed by: ORTHOPAEDIC SURGERY

## 2018-03-02 NOTE — PERIOP NOTES
Patient verified name, , and surgery as listed in [facility]. Patient provided medical/health information and PTA medications to the best of their ability. TYPE  CASE: 1b  Orders per surgeon: yes received and dated yes  Labs per surgeon: mrsa swab. Results: -  Labs per anesthesia protocol: potassium. Results -  EKG:  na     Nasal Swab collected per MD order and instructions for Mupirocin nasal ointment if required. Patient provided with and instructed on education handouts including Guide to Surgery, blood transfusions, pain management, and hand hygiene for the family and community, and [facility] brochure. Long handled prehab sponge given with instructions for use. Hibiclens and instructions given per hospital policy. Instructed patient to continue previous medications as prescribed prior to surgery unless otherwise directed and to take the following medications the day of surgery according to anesthesia guidelines : amlodipine,atorvastatin . Instructed patient to hold  the following medications on the day of surgery: none. Original medication prescription bottles all visualized during patient appointment. Patient teach back successful and patient demonstrates knowledge of instruction.

## 2018-03-06 NOTE — H&P
Date of Service: 2018-02-16  Work Status:  ????? Allergies:Sulfa  Medications: AmLODIPine Besylate;Ativan (1 MG, Take 1 tablet 45 minutes prior to mri); Atorvastatin Calcium;Duexis (800-26.6 MG, take 1 po tid prn for pain); HydroCHLOROthiazide; Lisinopril;Norco (5-325 MG, Take 1 tablet(s) by mouth 2 times a day as needed [PRN]);OxyCODONE HCl (5 MG, Take 1-2 tablet(s) by mouth every 4-6 hours as needed [PRN]);Zofran ODT (4 MG, Take 1 tablet(s) under the tongue every 4-6 hours as needed [PRN])    CC: LOW BACK AND LEG PAIN    HX:  He is a 37-YOBM really stocky who played college football as a . We have been following him for low back and right buttocks and leg pain. 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 The MRI scan does not show any problems at any other levels. It is not so much foraminal stenosis; it is like a disc herniation right paracentral. He really does not have spurring off the facet joint and if it was not for the disc protrusion I think the foramen would be reasonably okay. We had talked to him about surgery and there are two kinds of surgery. We could address the pars lysis and do a fusion/TLIF, but in his individual circumstance I think he might do pretty well with just a limited right sided L5-S1 discectomy. I discussed it with him. I went through his medical history and I think he has some HTN, elevated cholesterol. He is not on any blood thinners and not a diabetic. No heart, liver, lung or kidney disease. EXAM: He is a stocky gentleman. No gross deformities. Alert and oriented x 3. HEENT: PERRLA. Oropharynx is clear. Neck is without adenopathy or bruits. Lungs are clear to auscultation bilaterally. Heart is RRR without murmur. Abdomen is soft and nontender. I went through the hospital stay and recovery for both procedures.  Risks including  death, infection, paralysis, heart attack, stroke, bleeding, transfusion, clot in leg, clot in lung, dural tear, failure of fusion and/or instrumentation and the fact that I  cannot guarantee pain relief. He understands. I think given his young age it might be best to just try very limited right sided L5-S1 discectomy. PLAN:  We will see about getting him scheduled for that. We will give him Norco 5 mgs.     Electronically Signed By Kesha MARSHALL/allyssa

## 2018-03-12 ENCOUNTER — ANESTHESIA EVENT (OUTPATIENT)
Dept: SURGERY | Age: 38
End: 2018-03-12
Payer: COMMERCIAL

## 2018-03-12 ENCOUNTER — APPOINTMENT (OUTPATIENT)
Dept: GENERAL RADIOLOGY | Age: 38
End: 2018-03-12
Attending: ORTHOPAEDIC SURGERY
Payer: COMMERCIAL

## 2018-03-12 ENCOUNTER — HOSPITAL ENCOUNTER (OUTPATIENT)
Age: 38
Setting detail: OUTPATIENT SURGERY
Discharge: HOME | End: 2018-03-12
Attending: ORTHOPAEDIC SURGERY | Admitting: ORTHOPAEDIC SURGERY
Payer: COMMERCIAL

## 2018-03-12 ENCOUNTER — ANESTHESIA (OUTPATIENT)
Dept: SURGERY | Age: 38
End: 2018-03-12
Payer: COMMERCIAL

## 2018-03-12 VITALS
HEIGHT: 72 IN | BODY MASS INDEX: 38.41 KG/M2 | HEART RATE: 85 BPM | WEIGHT: 283.56 LBS | SYSTOLIC BLOOD PRESSURE: 149 MMHG | RESPIRATION RATE: 13 BRPM | OXYGEN SATURATION: 97 % | TEMPERATURE: 98.1 F | DIASTOLIC BLOOD PRESSURE: 76 MMHG

## 2018-03-12 DIAGNOSIS — M51.26 LUMBAR HERNIATED DISC: Primary | ICD-10-CM

## 2018-03-12 PROCEDURE — 76210000017 HC OR PH I REC 1.5 TO 2 HR: Performed by: ORTHOPAEDIC SURGERY

## 2018-03-12 PROCEDURE — 77030011640 HC PAD GRND REM COVD -A: Performed by: ORTHOPAEDIC SURGERY

## 2018-03-12 PROCEDURE — 77030018836 HC SOL IRR NACL ICUM -A: Performed by: ORTHOPAEDIC SURGERY

## 2018-03-12 PROCEDURE — 74011250636 HC RX REV CODE- 250/636: Performed by: ANESTHESIOLOGY

## 2018-03-12 PROCEDURE — 74011250637 HC RX REV CODE- 250/637: Performed by: ANESTHESIOLOGY

## 2018-03-12 PROCEDURE — 74011250636 HC RX REV CODE- 250/636

## 2018-03-12 PROCEDURE — 77030025623 HC BUR RND PRECIS STRY -D: Performed by: ORTHOPAEDIC SURGERY

## 2018-03-12 PROCEDURE — 74011250636 HC RX REV CODE- 250/636: Performed by: ORTHOPAEDIC SURGERY

## 2018-03-12 PROCEDURE — 74011250636 HC RX REV CODE- 250/636: Performed by: RADIOLOGY

## 2018-03-12 PROCEDURE — 77030021678 HC GLIDESCP STAT DISP VERT -B: Performed by: ANESTHESIOLOGY

## 2018-03-12 PROCEDURE — 77030020782 HC GWN BAIR PAWS FLX 3M -B: Performed by: ANESTHESIOLOGY

## 2018-03-12 PROCEDURE — 74011000250 HC RX REV CODE- 250: Performed by: ORTHOPAEDIC SURGERY

## 2018-03-12 PROCEDURE — 74011000250 HC RX REV CODE- 250: Performed by: ANESTHESIOLOGY

## 2018-03-12 PROCEDURE — 76060000035 HC ANESTHESIA 2 TO 2.5 HR: Performed by: ORTHOPAEDIC SURGERY

## 2018-03-12 PROCEDURE — 77030008771 HC TU NG SALEM SUMP -A: Performed by: ANESTHESIOLOGY

## 2018-03-12 PROCEDURE — 77030031139 HC SUT VCRL2 J&J -A: Performed by: ORTHOPAEDIC SURGERY

## 2018-03-12 PROCEDURE — 77030003028 HC SUT VCRL J&J -A: Performed by: ORTHOPAEDIC SURGERY

## 2018-03-12 PROCEDURE — 76210000020 HC REC RM PH II FIRST 0.5 HR: Performed by: ORTHOPAEDIC SURGERY

## 2018-03-12 PROCEDURE — 77030008703 HC TU ET UNCUF COVD -A: Performed by: ANESTHESIOLOGY

## 2018-03-12 PROCEDURE — 74011000250 HC RX REV CODE- 250

## 2018-03-12 PROCEDURE — 77030014650 HC SEAL MTRX FLOSEL BAXT -C: Performed by: ORTHOPAEDIC SURGERY

## 2018-03-12 PROCEDURE — 77030019908 HC STETH ESOPH SIMS -A: Performed by: ANESTHESIOLOGY

## 2018-03-12 PROCEDURE — 72020 X-RAY EXAM OF SPINE 1 VIEW: CPT

## 2018-03-12 PROCEDURE — 76010000171 HC OR TIME 2 TO 2.5 HR INTENSV-TIER 1: Performed by: ORTHOPAEDIC SURGERY

## 2018-03-12 PROCEDURE — 77030032490 HC SLV COMPR SCD KNE COVD -B: Performed by: ORTHOPAEDIC SURGERY

## 2018-03-12 RX ORDER — SODIUM CHLORIDE 0.9 % (FLUSH) 0.9 %
5-10 SYRINGE (ML) INJECTION AS NEEDED
Status: CANCELLED | OUTPATIENT
Start: 2018-03-12

## 2018-03-12 RX ORDER — SODIUM CHLORIDE 0.9 % (FLUSH) 0.9 %
5-10 SYRINGE (ML) INJECTION AS NEEDED
Status: DISCONTINUED | OUTPATIENT
Start: 2018-03-12 | End: 2018-03-12 | Stop reason: HOSPADM

## 2018-03-12 RX ORDER — LIDOCAINE HYDROCHLORIDE 10 MG/ML
0.1 INJECTION INFILTRATION; PERINEURAL AS NEEDED
Status: DISCONTINUED | OUTPATIENT
Start: 2018-03-12 | End: 2018-03-12 | Stop reason: HOSPADM

## 2018-03-12 RX ORDER — OXYCODONE AND ACETAMINOPHEN 5; 325 MG/1; MG/1
1 TABLET ORAL AS NEEDED
Status: DISCONTINUED | OUTPATIENT
Start: 2018-03-12 | End: 2018-03-12 | Stop reason: HOSPADM

## 2018-03-12 RX ORDER — FAMOTIDINE 20 MG/1
20 TABLET, FILM COATED ORAL ONCE
Status: COMPLETED | OUTPATIENT
Start: 2018-03-12 | End: 2018-03-12

## 2018-03-12 RX ORDER — DIPHENHYDRAMINE HYDROCHLORIDE 50 MG/ML
12.5 INJECTION, SOLUTION INTRAMUSCULAR; INTRAVENOUS ONCE
Status: DISCONTINUED | OUTPATIENT
Start: 2018-03-12 | End: 2018-03-12 | Stop reason: HOSPADM

## 2018-03-12 RX ORDER — HYDROMORPHONE HYDROCHLORIDE 2 MG/ML
0.5 INJECTION, SOLUTION INTRAMUSCULAR; INTRAVENOUS; SUBCUTANEOUS
Status: DISCONTINUED | OUTPATIENT
Start: 2018-03-12 | End: 2018-03-12 | Stop reason: HOSPADM

## 2018-03-12 RX ORDER — SODIUM CHLORIDE, SODIUM LACTATE, POTASSIUM CHLORIDE, CALCIUM CHLORIDE 600; 310; 30; 20 MG/100ML; MG/100ML; MG/100ML; MG/100ML
100 INJECTION, SOLUTION INTRAVENOUS CONTINUOUS
Status: DISCONTINUED | OUTPATIENT
Start: 2018-03-12 | End: 2018-03-12 | Stop reason: HOSPADM

## 2018-03-12 RX ORDER — SODIUM CHLORIDE 0.9 % (FLUSH) 0.9 %
5-10 SYRINGE (ML) INJECTION EVERY 8 HOURS
Status: CANCELLED | OUTPATIENT
Start: 2018-03-12

## 2018-03-12 RX ORDER — SUCCINYLCHOLINE CHLORIDE 20 MG/ML
INJECTION INTRAMUSCULAR; INTRAVENOUS AS NEEDED
Status: DISCONTINUED | OUTPATIENT
Start: 2018-03-12 | End: 2018-03-12 | Stop reason: HOSPADM

## 2018-03-12 RX ORDER — DEXAMETHASONE SODIUM PHOSPHATE 4 MG/ML
INJECTION, SOLUTION INTRA-ARTICULAR; INTRALESIONAL; INTRAMUSCULAR; INTRAVENOUS; SOFT TISSUE AS NEEDED
Status: DISCONTINUED | OUTPATIENT
Start: 2018-03-12 | End: 2018-03-12 | Stop reason: HOSPADM

## 2018-03-12 RX ORDER — ACETAMINOPHEN 10 MG/ML
1000 INJECTION, SOLUTION INTRAVENOUS ONCE
Status: COMPLETED | OUTPATIENT
Start: 2018-03-12 | End: 2018-03-12

## 2018-03-12 RX ORDER — GLYCOPYRROLATE 0.2 MG/ML
INJECTION INTRAMUSCULAR; INTRAVENOUS AS NEEDED
Status: DISCONTINUED | OUTPATIENT
Start: 2018-03-12 | End: 2018-03-12 | Stop reason: HOSPADM

## 2018-03-12 RX ORDER — VANCOMYCIN HYDROCHLORIDE 1 G/20ML
INJECTION, POWDER, LYOPHILIZED, FOR SOLUTION INTRAVENOUS AS NEEDED
Status: DISCONTINUED | OUTPATIENT
Start: 2018-03-12 | End: 2018-03-12 | Stop reason: HOSPADM

## 2018-03-12 RX ORDER — MIDAZOLAM HYDROCHLORIDE 1 MG/ML
2 INJECTION, SOLUTION INTRAMUSCULAR; INTRAVENOUS
Status: DISCONTINUED | OUTPATIENT
Start: 2018-03-12 | End: 2018-03-12 | Stop reason: HOSPADM

## 2018-03-12 RX ORDER — SODIUM CHLORIDE 9 MG/ML
50 INJECTION, SOLUTION INTRAVENOUS CONTINUOUS
Status: DISCONTINUED | OUTPATIENT
Start: 2018-03-12 | End: 2018-03-12 | Stop reason: HOSPADM

## 2018-03-12 RX ORDER — GABAPENTIN 300 MG/1
600 CAPSULE ORAL ONCE
Status: COMPLETED | OUTPATIENT
Start: 2018-03-12 | End: 2018-03-12

## 2018-03-12 RX ORDER — ACETAMINOPHEN 500 MG
1000 TABLET ORAL ONCE
Status: COMPLETED | OUTPATIENT
Start: 2018-03-12 | End: 2018-03-12

## 2018-03-12 RX ORDER — FENTANYL CITRATE 50 UG/ML
INJECTION, SOLUTION INTRAMUSCULAR; INTRAVENOUS AS NEEDED
Status: DISCONTINUED | OUTPATIENT
Start: 2018-03-12 | End: 2018-03-12 | Stop reason: HOSPADM

## 2018-03-12 RX ORDER — NEOSTIGMINE METHYLSULFATE 1 MG/ML
INJECTION INTRAVENOUS AS NEEDED
Status: DISCONTINUED | OUTPATIENT
Start: 2018-03-12 | End: 2018-03-12 | Stop reason: HOSPADM

## 2018-03-12 RX ORDER — MIDAZOLAM HYDROCHLORIDE 1 MG/ML
2 INJECTION, SOLUTION INTRAMUSCULAR; INTRAVENOUS ONCE
Status: COMPLETED | OUTPATIENT
Start: 2018-03-12 | End: 2018-03-12

## 2018-03-12 RX ORDER — PROPOFOL 10 MG/ML
INJECTION, EMULSION INTRAVENOUS AS NEEDED
Status: DISCONTINUED | OUTPATIENT
Start: 2018-03-12 | End: 2018-03-12 | Stop reason: HOSPADM

## 2018-03-12 RX ORDER — FENTANYL CITRATE 50 UG/ML
25 INJECTION, SOLUTION INTRAMUSCULAR; INTRAVENOUS ONCE
Status: DISCONTINUED | OUTPATIENT
Start: 2018-03-12 | End: 2018-03-12 | Stop reason: HOSPADM

## 2018-03-12 RX ORDER — ROCURONIUM BROMIDE 10 MG/ML
INJECTION, SOLUTION INTRAVENOUS AS NEEDED
Status: DISCONTINUED | OUTPATIENT
Start: 2018-03-12 | End: 2018-03-12 | Stop reason: HOSPADM

## 2018-03-12 RX ORDER — BUPIVACAINE HYDROCHLORIDE 5 MG/ML
INJECTION, SOLUTION EPIDURAL; INTRACAUDAL AS NEEDED
Status: DISCONTINUED | OUTPATIENT
Start: 2018-03-12 | End: 2018-03-12 | Stop reason: HOSPADM

## 2018-03-12 RX ORDER — OXYCODONE AND ACETAMINOPHEN 10; 325 MG/1; MG/1
1 TABLET ORAL
Qty: 25 TAB | Refills: 0 | Status: SHIPPED | OUTPATIENT
Start: 2018-03-12 | End: 2018-09-14

## 2018-03-12 RX ORDER — LIDOCAINE HYDROCHLORIDE 20 MG/ML
INJECTION, SOLUTION EPIDURAL; INFILTRATION; INTRACAUDAL; PERINEURAL AS NEEDED
Status: DISCONTINUED | OUTPATIENT
Start: 2018-03-12 | End: 2018-03-12 | Stop reason: HOSPADM

## 2018-03-12 RX ORDER — CEFAZOLIN SODIUM 1 G/3ML
INJECTION, POWDER, FOR SOLUTION INTRAMUSCULAR; INTRAVENOUS AS NEEDED
Status: DISCONTINUED | OUTPATIENT
Start: 2018-03-12 | End: 2018-03-12 | Stop reason: HOSPADM

## 2018-03-12 RX ORDER — OXYCODONE HYDROCHLORIDE 5 MG/1
5 TABLET ORAL
Status: DISCONTINUED | OUTPATIENT
Start: 2018-03-12 | End: 2018-03-12 | Stop reason: HOSPADM

## 2018-03-12 RX ORDER — ONDANSETRON 2 MG/ML
INJECTION INTRAMUSCULAR; INTRAVENOUS AS NEEDED
Status: DISCONTINUED | OUTPATIENT
Start: 2018-03-12 | End: 2018-03-12 | Stop reason: HOSPADM

## 2018-03-12 RX ADMIN — HYDROMORPHONE HYDROCHLORIDE 0.5 MG: 2 INJECTION, SOLUTION INTRAMUSCULAR; INTRAVENOUS; SUBCUTANEOUS at 16:41

## 2018-03-12 RX ADMIN — ONDANSETRON 4 MG: 2 INJECTION INTRAMUSCULAR; INTRAVENOUS at 16:15

## 2018-03-12 RX ADMIN — ROCURONIUM BROMIDE 10 MG: 10 INJECTION, SOLUTION INTRAVENOUS at 15:23

## 2018-03-12 RX ADMIN — ACETAMINOPHEN 1000 MG: 10 INJECTION, SOLUTION INTRAVENOUS at 17:39

## 2018-03-12 RX ADMIN — FENTANYL CITRATE 100 MCG: 50 INJECTION, SOLUTION INTRAMUSCULAR; INTRAVENOUS at 14:11

## 2018-03-12 RX ADMIN — PROPOFOL 340 MG: 10 INJECTION, EMULSION INTRAVENOUS at 14:11

## 2018-03-12 RX ADMIN — MIDAZOLAM HYDROCHLORIDE 2 MG: 1 INJECTION, SOLUTION INTRAMUSCULAR; INTRAVENOUS at 10:57

## 2018-03-12 RX ADMIN — ROCURONIUM BROMIDE 45 MG: 10 INJECTION, SOLUTION INTRAVENOUS at 14:18

## 2018-03-12 RX ADMIN — LIDOCAINE HYDROCHLORIDE 100 MG: 20 INJECTION, SOLUTION EPIDURAL; INFILTRATION; INTRACAUDAL; PERINEURAL at 14:11

## 2018-03-12 RX ADMIN — FENTANYL CITRATE 100 MCG: 50 INJECTION, SOLUTION INTRAMUSCULAR; INTRAVENOUS at 14:33

## 2018-03-12 RX ADMIN — HYDROMORPHONE HYDROCHLORIDE 0.5 MG: 2 INJECTION, SOLUTION INTRAMUSCULAR; INTRAVENOUS; SUBCUTANEOUS at 16:50

## 2018-03-12 RX ADMIN — NEOSTIGMINE METHYLSULFATE 4 MG: 1 INJECTION INTRAVENOUS at 16:15

## 2018-03-12 RX ADMIN — PROMETHAZINE HYDROCHLORIDE 6.25 MG: 25 INJECTION INTRAMUSCULAR; INTRAVENOUS at 17:09

## 2018-03-12 RX ADMIN — GABAPENTIN 600 MG: 300 CAPSULE ORAL at 10:25

## 2018-03-12 RX ADMIN — SODIUM CHLORIDE, SODIUM LACTATE, POTASSIUM CHLORIDE, AND CALCIUM CHLORIDE: 600; 310; 30; 20 INJECTION, SOLUTION INTRAVENOUS at 15:14

## 2018-03-12 RX ADMIN — SUCCINYLCHOLINE CHLORIDE 180 MG: 20 INJECTION INTRAMUSCULAR; INTRAVENOUS at 14:11

## 2018-03-12 RX ADMIN — ACETAMINOPHEN 1000 MG: 500 TABLET, FILM COATED ORAL at 10:25

## 2018-03-12 RX ADMIN — Medication 3 G: at 14:08

## 2018-03-12 RX ADMIN — FAMOTIDINE 20 MG: 20 TABLET, FILM COATED ORAL at 10:21

## 2018-03-12 RX ADMIN — ROCURONIUM BROMIDE 5 MG: 10 INJECTION, SOLUTION INTRAVENOUS at 14:11

## 2018-03-12 RX ADMIN — GLYCOPYRROLATE 0.6 MG: 0.2 INJECTION INTRAMUSCULAR; INTRAVENOUS at 16:15

## 2018-03-12 RX ADMIN — DEXAMETHASONE SODIUM PHOSPHATE 4 MG: 4 INJECTION, SOLUTION INTRA-ARTICULAR; INTRALESIONAL; INTRAMUSCULAR; INTRAVENOUS; SOFT TISSUE at 15:12

## 2018-03-12 RX ADMIN — SODIUM CHLORIDE, SODIUM LACTATE, POTASSIUM CHLORIDE, AND CALCIUM CHLORIDE: 600; 310; 30; 20 INJECTION, SOLUTION INTRAVENOUS at 14:05

## 2018-03-12 RX ADMIN — OXYCODONE HYDROCHLORIDE 5 MG: 5 TABLET ORAL at 18:07

## 2018-03-12 NOTE — IP AVS SNAPSHOT
[address] [address] 
[phone] Patient: Candy Gabriel MRN: [id number] EET:1/79/0071 About your hospitalization You were admitted on:  March 12, 2018 You last received care in the:  [facility] PACU You were discharged on:  March 12, 2018 Why you were hospitalized Your primary diagnosis was:  Lumbar Herniated Disc Follow-up Information Follow up With Details Comments Contact Info Adalberto Abdi NP   [address] 
[phone] 53 [facility] MD Gael  Follow up in 2 weeks. Keep previously arranged follow up. Call office to confirm. [address] [id number] [phone] Discharge Orders None A check marcel indicates which time of day the medication should be taken. My Medications START taking these medications Instructions Each Dose to Equal  
 Morning Noon Evening Bedtime  
 oxyCODONE-acetaminophen  mg per tablet Commonly known as:  PERCOCET 10 Your last dose was: Your next dose is: Take 1 Tab by mouth every six (6) hours as needed for Pain. Max Daily Amount: 4 Tabs. 1 Tab CONTINUE taking these medications Instructions Each Dose to Equal  
 Morning Noon Evening Bedtime  
 amLODIPine 5 mg tablet Commonly known as:  Page Avila Your last dose was: Your next dose is: Take 1 Tab by mouth daily. 5 mg  
    
   
   
   
  
 atorvastatin 20 mg tablet Commonly known as:  LIPITOR Your last dose was: Your next dose is: Take 1 Tab by mouth daily. 20 mg  
    
   
   
   
  
 cpap machine kit Your last dose was: Your next dose is:    
   
   
 by Does Not Apply route. autopap 5- 20 cm  
     
   
   
   
  
 lisinopril-hydroCHLOROthiazide 20-25 mg per tablet Commonly known as:  Clau Rivera Your last dose was: Your next dose is: Take 1 Tab by mouth daily. 1 Tab Where to Get Your Medications Information on where to get these meds will be given to you by the nurse or doctor. ! Ask your nurse or doctor about these medications  
  oxyCODONE-acetaminophen  mg per tablet Discharge Instructions Lumbar Discectomy: What to Expect at [facility] Your Recovery Discectomy is surgery to remove part or all of a bulging (herniated) disc in the spine. A bulging disc may press on the spinal cord or spinal nerves and cause leg pain and numbness. Your doctor made a 1- to 2-inch cut (incision) in the skin over the spine. He then used surgical tools through the incision to do the surgery. You can expect your back to feel stiff or sore after surgery. This should improve in the weeks after surgery. You may have relief from your symptoms right away, or you may get better over days or weeks. In the weeks after your surgery, it may be hard to sit or  one position for very long and you may need pain medicine. It may take 8 weeks or longer to get back to doing your usual activities. Your doctor may advise you to work with a physical therapist to strengthen the muscles around your spine and trunk. You will need to learn how to lift, twist, and bend so you do not put too much strain on your back. The pain or numbness you had in your legs before surgery should get better or go away completely. This care sheet gives you a general idea about how long it will take for you to recover. But each person recovers at a different pace. Follow the steps below to get better as quickly as possible. How can you care for yourself at home? Activity · Rest when you feel tired. Getting enough sleep will help you recover. · Try to walk each day.  Start by walking a little more than you did the day before. Bit by bit, increase the amount you walk. Walking is a gentle exercise and helps prevent pneumonia and constipation. Walking may also decrease your muscle soreness after surgery. · If advised by your doctor, you may need to avoid lifting anything that would cause excessive strain on your back. This may include heavy grocery bags and milk containers, a heavy briefcase or backpack, cat litter or dog food bags, a child, or a vacuum . · Avoid strenuous activities, such as bicycle riding, jogging, weight lifting, or aerobic exercise, until your doctor says it is okay. · Ask your doctor when you can drive again. · Avoid riding in a car for more than 30 minutes at a time for 2 to 4 weeks after surgery. If you must ride in a car for a longer distance, stop often to walk and stretch your legs. · Try to change your position about every 30 minutes while you sit or stand. This will help decrease your back pain while you heal. 
· Your time off from work depends on how quickly you feel better and on the type of work you do. If you work in an office, you likely can go back to work sooner than if you have a job where you are very active. Talk with your doctor about your work needs. · You may have sex as soon as you feel able, but avoid positions that put stress on your back or cause pain. Diet · You can eat your normal diet. If your stomach is upset, try bland, low-fat foods like plain rice, broiled chicken, toast, and yogurt. · Drink plenty of fluids (unless your doctor tells you not to). · You may notice that your bowel movements are not regular right after your surgery. This is common. Try to avoid constipation and straining with bowel movements. You may want to take a fiber supplement every day. If you have not had a bowel movement after a couple of days, take a mild laxative. Medicines · Take pain medicines exactly as directed. ¨ If the doctor gave you a prescription medicine for pain, take it as prescribed. ¨ If you are not taking a prescription pain medicine, ask your doctor if you can take an over-the-counter medicine. ¨ Do not take two or more pain medicines at the same time unless the doctor told you to. Many pain medicines have acetaminophen, which is Tylenol. Too much acetaminophen (Tylenol) can be harmful. · If you think your pain medicine is making you sick to your stomach: 
¨ Take your medicine after meals (unless your doctor has told you not to). ¨ Ask your doctor for a different pain medicine. · If your doctor prescribed antibiotics, take them as directed. Do not stop taking them just because you feel better. You need to take the full course of antibiotics. Incision care · If you have strips of tape on the cut (incision) the doctor made, leave the tape on for a week or until it falls off. · Gently rinse the area daily with warm, soapy water, and pat it dry. Make sure you understand how to care for your incision before you leave the hospital. 
· Keep the area clean and dry. You may cover it with a gauze bandage if it weeps or rubs against clothing. Change the bandage every day. Exercise · Do back exercises as instructed by your doctor. · Your doctor may recommend that you work with a physical therapist to improve the strength and flexibility of your back. Other instructions · After your incision heals, about 5 to 7 days after surgery, you can use ice, a heating pad, a hot water bottle, or gentle massage on your back to reduce stiffness. Follow-up care is a key part of your treatment and safety. Be sure to make and go to all appointments, and call your doctor if you are having problems. Its also a good idea to know your test results and keep a list of the medicines you take. [employer] [phone] When should you call for help? Call 911 anytime you think you may need emergency care. For example, call if: 
· You pass out (lose consciousness). · You have sudden chest pain and shortness of breath, or you cough up blood. · You lose bladder or bowel control. · One or both legs suddenly feel weak or numb. Call your doctor now or seek immediate medical care if: 
· You have pain that does not get better after you take pain medicine. · You have a headache that does not get better after you take medicine for it. · You have loose stitches, or your incision comes open. · You have signs of infection, such as: 
¨ Increased pain, swelling, warmth, or redness. ¨ Red streaks leading from the incision. ¨ Pus draining from the incision. ¨ Swollen lymph nodes in your neck, armpits or groin. ¨ A fever. · You have blood or fluid draining from the incision. Watch closely for changes in your health, and be sure to contact your doctor if: 
· You have new numbness or tingling in your legs. · You have new pain or weakness in your legs. · You do not have a bowel movement after taking a laxative. Where can you learn more? Go to [employer]. Enter I236 in the search box to learn more about \"Lumbar Microdiscectomy: What to Expect at Home. \"  
© 5616-4876 Healthwise, Incorporated. Care instructions adapted under license by [employer] (which disclaims liability or warranty for this information). This care instruction is for use with your licensed healthcare professional. If you have questions about a medical condition or this instruction, always ask your healthcare professional. Yolande Wright any warranty or liability for your use of this information. ACTIVITY · As tolerated and as directed by your doctor. · Bathe or shower as directed by your doctor. DIET · Clear liquids until no nausea or vomiting; then light diet for the first day.  
· Advance to regular diet on second day, unless your doctor orders otherwise. · If nausea and vomiting continues, call your doctor. PAIN 
· Take pain medication as directed by your doctor. · Call your doctor if pain is NOT relieved by medication. · DO NOT take aspirin of blood thinners unless directed by your doctor. CALL YOUR DOCTOR IF  
· Excessive bleeding that does not stop after holding pressure over the area · Temperature of 101 degrees F or above · Excessive redness, swelling or bruising, and/ or green or yellow, smelly discharge from incision AFTER ANESTHESIA · For the first 24 hours: DO NOT Drive, Drink alcoholic beverages, or Make important decisions. · Be aware of dizziness following anesthesia and while taking pain medication. After general anesthesia or intravenous sedation, for 24 hours or while taking prescription Narcotics: · Limit your activities · Do not drive and operate hazardous machinery · Do not make important personal or business decisions · Do  not drink alcoholic beverages · If you have not urinated within 8 hours after discharge, please contact your surgeon on call. *  Please give a list of your current medications to your Primary Care Provider. *  Please update this list whenever your medications are discontinued, doses are 
    changed, or new medications (including over-the-counter products) are added. *  Please carry medication information at all times in case of emergency situations. These are general instructions for a healthy lifestyle: No smoking/ No tobacco products/ Avoid exposure to second hand smoke Surgeon General's Warning:  Quitting smoking now greatly reduces serious risk to your health. Obesity, smoking, and sedentary lifestyle greatly increases your risk for illness A healthy diet, regular physical exercise & weight monitoring are important for maintaining a healthy lifestyle You may be retaining fluid if you have a history of heart failure or if you experience any of the following symptoms:  Weight gain of 3 pounds or more overnight or 5 pounds in a week, increased swelling in our hands or feet or shortness of breath while lying flat in bed. Please call your doctor as soon as you notice any of these symptoms; do not wait until your next office visit. Recognize signs and symptoms of STROKE: 
 
F-face looks uneven A-arms unable to move or move unevenly S-speech slurred or non-existent T-time-call 911 as soon as signs and symptoms begin-DO NOT go Back to bed or wait to see if you get better-TIME IS BRAIN. Introducing [facility]! Dear Merry Campos: Thank you for requesting a [employer] account. Our records indicate that you already have an active [employer] account. You can access your account anytime at [email or web address] Did you know that you can access your hospital and ER discharge instructions at any time in [employer]? You can also review all of your test results from your hospital stay or ER visit. Additional Information If you have questions, please visit the Frequently Asked Questions section of the [employer] website at [email or web address] Remember, [employer] is NOT to be used for urgent needs. For medical emergencies, dial 911. Now available from your iPhone and Android! Unresulted Labs-Please follow up with your PCP about these lab tests Order Current Status NC XR TECHNOLOGIST SERVICE In process XR SPINE LUMB SNGL V In process Providers Seen During Your Hospitalization Provider Specialty Primary office phone Margy Medina MD Orthopedic Surgery [phone] Your Primary Care Physician (PCP) Primary Care Physician Office Phone Office Fax [address], [address] [phone] You are allergic to the following Allergen Reactions Penicillins Hives Sulfa (Sulfonamide Antibiotics) Hives Recent Documentation Height Weight BMI Smoking Status 1.822 m 128.6 kg 38.73 kg/m2 Former Smoker Emergency Contacts Name Discharge Info Relation Home Work Mobile Kamini Shen DISCHARGE CAREGIVER [3] Spouse [3] [phone] Patient Belongings The following personal items are in your possession at time of discharge: 
  Dental Appliances: None  Visual Aid: Glasses      Home Medications: None   Jewelry: Ring  Clothing: Footwear, Jacket/Coat, Pants, Shirt, Socks, Undergarments    Other Valuables: None  Personal Items Sent to Safe: none Please provide this summary of care documentation to your next provider. Signatures-by signing, you are acknowledging that this After Visit Summary has been reviewed with you and you have received a copy. Patient Signature:  ____________________________________________________________ Date:  ____________________________________________________________  
  
[facility] Provider Signature:  ____________________________________________________________ Date:  ____________________________________________________________

## 2018-03-12 NOTE — BRIEF OP NOTE
BRIEF OPERATIVE NOTE    Date of Procedure: 3/12/2018   Preoperative Diagnosis: Lumbosacral disc herniation [M51.27]  Postoperative Diagnosis: Lumbosacral disc herniation [M51.27]    Procedure(s):  RIGHT L5-S1 DISCECTOMY /  NEEDS 2 SCRUB TECHS  Surgeon(s) and Role:     * Pily Read MD - Primary         Assistant Staff: None      Surgical Staff:  Circ-1: Paulo Moss RN  Circ-Relief: Romulo Brown RN  Scrub Tech-1: Lord Kemp  Scrub Tech-2: Candice Ridley [place]  Event Time In   Incision Start 1435   Incision Close 1614     Anesthesia: General   Estimated Blood Loss: 40cc  Specimens: * No specimens in log *   Findings: small HNP and spur   Complications: none  Implants: * No implants in log *

## 2018-03-12 NOTE — ANESTHESIA POSTPROCEDURE EVALUATION
Post-Anesthesia Evaluation and Assessment    Patient: Federico Boss MRN: [id number]  SSN: [id number]    YOB: 1980  Age: 40 y.o. Sex: male       Cardiovascular Function/Vital Signs  Visit Vitals    /87    Pulse 79    Temp 37.1 °C (98.7 °F)    Resp 11    Ht 5' 11.75\" (1.822 m)    Wt 128.6 kg (283 lb 9 oz)    SpO2 95%    BMI 38.73 kg/m2       Patient is status post general anesthesia for Procedure(s):  RIGHT L5-S1 DISCECTOMY /  NEEDS 2 SCRUB TECHS. Nausea/Vomiting: None    Postoperative hydration reviewed and adequate. Pain:  Pain Scale 1: Numeric (0 - 10) (03/12/18 1650)  Pain Intensity 1: 7 (03/12/18 1650)   Managed    Neurological Status:   Neuro (WDL): Exceptions to WDL (03/12/18 1632)  Neuro  Neurologic State: Drowsy (03/12/18 1632)  LUE Motor Response: Purposeful (03/12/18 1632)  LLE Motor Response: Purposeful (03/12/18 1632)  RUE Motor Response: Purposeful (03/12/18 1632)  RLE Motor Response: Purposeful (03/12/18 1632)   At baseline    Mental Status and Level of Consciousness: Arousable    Pulmonary Status:   O2 Device: Room air (03/12/18 1726)   Adequate oxygenation and airway patent    Complications related to anesthesia: None    Post-anesthesia assessment completed.  No concerns    Signed By: Jen Streeter MD     March 12, 2018

## 2018-03-12 NOTE — ANESTHESIA PREPROCEDURE EVALUATION
Anesthetic History     PONV          Review of Systems / Medical History  Patient summary reviewed and pertinent labs reviewed    Pulmonary        Sleep apnea (Occasional CPAP use): CPAP           Neuro/Psych   Within defined limits           Cardiovascular    Hypertension: well controlled          Hyperlipidemia    Exercise tolerance: >4 METS     GI/Hepatic/Renal                Endo/Other        Obesity and arthritis     Other Findings              Physical Exam    Airway  Mallampati: III  TM Distance: > 6 cm  Neck ROM: normal range of motion   Mouth opening: Normal     Cardiovascular    Rhythm: regular  Rate: normal         Dental  No notable dental hx       Pulmonary  Breath sounds clear to auscultation               Abdominal         Other Findings            Anesthetic Plan    ASA: 2  Anesthesia type: general            Anesthetic plan and risks discussed with: Patient and Family

## 2018-03-12 NOTE — DISCHARGE INSTRUCTIONS
Lumbar Discectomy: What to Expect at [address] is surgery to remove part or all of a bulging (herniated) disc in the spine. A bulging disc may press on the spinal cord or spinal nerves and cause leg pain and numbness. Your doctor made a 1- to 2-inch cut (incision) in the skin over the spine. He then used surgical tools through the incision to do the surgery. You can expect your back to feel stiff or sore after surgery. This should improve in the weeks after surgery. You may have relief from your symptoms right away, or you may get better over days or weeks. In the weeks after your surgery, it may be hard to sit or  one position for very long and you may need pain medicine. It may take 8 weeks or longer to get back to doing your usual activities. Your doctor may advise you to work with a physical therapist to strengthen the muscles around your spine and trunk. You will need to learn how to lift, twist, and bend so you do not put too much strain on your back. The pain or numbness you had in your legs before surgery should get better or go away completely. This care sheet gives you a general idea about how long it will take for you to recover. But each person recovers at a different pace. Follow the steps below to get better as quickly as possible. How can you care for yourself at home? Activity  · Rest when you feel tired. Getting enough sleep will help you recover. · Try to walk each day. Start by walking a little more than you did the day before. Bit by bit, increase the amount you walk. Walking is a gentle exercise and helps prevent pneumonia and constipation. Walking may also decrease your muscle soreness after surgery. · If advised by your doctor, you may need to avoid lifting anything that would cause excessive strain on your back.  This may include heavy grocery bags and milk containers, a heavy briefcase or backpack, cat litter or dog food bags, a child, or a vacuum . · Avoid strenuous activities, such as bicycle riding, jogging, weight lifting, or aerobic exercise, until your doctor says it is okay. · Ask your doctor when you can drive again. · Avoid riding in a car for more than 30 minutes at a time for 2 to 4 weeks after surgery. If you must ride in a car for a longer distance, stop often to walk and stretch your legs. · Try to change your position about every 30 minutes while you sit or stand. This will help decrease your back pain while you heal.  · Your time off from work depends on how quickly you feel better and on the type of work you do. If you work in an office, you likely can go back to work sooner than if you have a job where you are very active. Talk with your doctor about your work needs. · You may have sex as soon as you feel able, but avoid positions that put stress on your back or cause pain. Diet  · You can eat your normal diet. If your stomach is upset, try bland, low-fat foods like plain rice, broiled chicken, toast, and yogurt. · Drink plenty of fluids (unless your doctor tells you not to). · You may notice that your bowel movements are not regular right after your surgery. This is common. Try to avoid constipation and straining with bowel movements. You may want to take a fiber supplement every day. If you have not had a bowel movement after a couple of days, take a mild laxative. Medicines  · Take pain medicines exactly as directed. ¨ If the doctor gave you a prescription medicine for pain, take it as prescribed. ¨ If you are not taking a prescription pain medicine, ask your doctor if you can take an over-the-counter medicine. ¨ Do not take two or more pain medicines at the same time unless the doctor told you to. Many pain medicines have acetaminophen, which is Tylenol. Too much acetaminophen (Tylenol) can be harmful.   · If you think your pain medicine is making you sick to your stomach:  ¨ Take your medicine after meals (unless your doctor has told you not to). ¨ Ask your doctor for a different pain medicine. · If your doctor prescribed antibiotics, take them as directed. Do not stop taking them just because you feel better. You need to take the full course of antibiotics. Incision care  · If you have strips of tape on the cut (incision) the doctor made, leave the tape on for a week or until it falls off. · Gently rinse the area daily with warm, soapy water, and pat it dry. Make sure you understand how to care for your incision before you leave the hospital.  · Keep the area clean and dry. You may cover it with a gauze bandage if it weeps or rubs against clothing. Change the bandage every day. Exercise  · Do back exercises as instructed by your doctor. · Your doctor may recommend that you work with a physical therapist to improve the strength and flexibility of your back. Other instructions  · After your incision heals, about 5 to 7 days after surgery, you can use ice, a heating pad, a hot water bottle, or gentle massage on your back to reduce stiffness. Follow-up care is a key part of your treatment and safety. Be sure to make and go to all appointments, and call your doctor if you are having problems. Its also a good idea to know your test results and keep a list of the medicines you take. [facility] [phone]    When should you call for help? Call 911 anytime you think you may need emergency care. For example, call if:  · You pass out (lose consciousness). · You have sudden chest pain and shortness of breath, or you cough up blood. · You lose bladder or bowel control. · One or both legs suddenly feel weak or numb. Call your doctor now or seek immediate medical care if:  · You have pain that does not get better after you take pain medicine. · You have a headache that does not get better after you take medicine for it. · You have loose stitches, or your incision comes open.   · You have signs of infection, such as:  ¨ Increased pain, swelling, warmth, or redness. ¨ Red streaks leading from the incision. ¨ Pus draining from the incision. ¨ Swollen lymph nodes in your neck, armpits or groin. ¨ A fever. · You have blood or fluid draining from the incision. Watch closely for changes in your health, and be sure to contact your doctor if:  · You have new numbness or tingling in your legs. · You have new pain or weakness in your legs. · You do not have a bowel movement after taking a laxative. Where can you learn more? Go to [place]. Enter R661 in the search box to learn more about \"Lumbar Microdiscectomy: What to Expect at Home. \"   © 8809-9180 [employer]. Care instructions adapted under license by [employer] (which disclaims liability or warranty for this information). This care instruction is for use with your licensed healthcare professional. If you have questions about a medical condition or this instruction, always ask your healthcare professional. Zelalem Jaimes any warranty or liability for your use of this information. ACTIVITY  · As tolerated and as directed by your doctor. · Bathe or shower as directed by your doctor. DIET  · Clear liquids until no nausea or vomiting; then light diet for the first day. · Advance to regular diet on second day, unless your doctor orders otherwise. · If nausea and vomiting continues, call your doctor. PAIN  · Take pain medication as directed by your doctor. · Call your doctor if pain is NOT relieved by medication. · DO NOT take aspirin of blood thinners unless directed by your doctor.      CALL YOUR DOCTOR IF   · Excessive bleeding that does not stop after holding pressure over the area  · Temperature of 101 degrees F or above  · Excessive redness, swelling or bruising, and/ or green or yellow, smelly discharge from incision    AFTER ANESTHESIA   · For the first 24 hours: DO NOT Drive, Drink alcoholic beverages, or Make important decisions. · Be aware of dizziness following anesthesia and while taking pain medication. After general anesthesia or intravenous sedation, for 24 hours or while taking prescription Narcotics:  · Limit your activities  · Do not drive and operate hazardous machinery  · Do not make important personal or business decisions  · Do  not drink alcoholic beverages  · If you have not urinated within 8 hours after discharge, please contact your surgeon on call. *  Please give a list of your current medications to your Primary Care Provider. *  Please update this list whenever your medications are discontinued, doses are      changed, or new medications (including over-the-counter products) are added. *  Please carry medication information at all times in case of emergency situations. These are general instructions for a healthy lifestyle:    No smoking/ No tobacco products/ Avoid exposure to second hand smoke    Surgeon General's Warning:  Quitting smoking now greatly reduces serious risk to your health. Obesity, smoking, and sedentary lifestyle greatly increases your risk for illness    A healthy diet, regular physical exercise & weight monitoring are important for maintaining a healthy lifestyle    You may be retaining fluid if you have a history of heart failure or if you experience any of the following symptoms:  Weight gain of 3 pounds or more overnight or 5 pounds in a week, increased swelling in our hands or feet or shortness of breath while lying flat in bed. Please call your doctor as soon as you notice any of these symptoms; do not wait until your next office visit. Recognize signs and symptoms of STROKE:    F-face looks uneven    A-arms unable to move or move unevenly    S-speech slurred or non-existent    T-time-call 911 as soon as signs and symptoms begin-DO NOT go       Back to bed or wait to see if you get better-TIME IS BRAIN.

## 2018-03-13 NOTE — OP NOTES
[facility]  OPERATIVE REPORT    Agustina Maldonado  MR#: [id number]  : 1980  ACCOUNT #: [de-identified]   DATE OF SERVICE: 2018    SURGEON:  Genesis Angel MD.    ASSISTANT:  None. PREOPERATIVE DIAGNOSIS:  Right-sided L5-S1 lateral and foraminal disk herniation. POSTOPERATIVE DIAGNOSIS:  Right-sided L5-S1 lateral and foraminal disk herniation  with the addition of some spurring off the endplates causing some pressure on the nerve root. PROCEDURE:  Right-sided L5-S1 laminotomy, diskectomy in the lateral and far lateral location, and S1 foraminotomy. ANESTHESIA:  GETA. ESTIMATED BLOOD LOSS:  40 mL. FLUIDS:  A liter of crystalloid. SPECIMENS:  None. COMPLICATIONS:  None. IMPLANTS:       INDICATIONS:  The patient is a 60-year-old black male who has been having ongoing right leg pain in L5 and S1 distribution. He has failed to improve long-term with epidural blocks, physical therapy, pain medication, activity restriction. He also has a grade I spondylolisthesis L5-S1, but it was felt that we could do a minimal laminotomy and remove the disk on the right and try to address his symptoms without doing a fusion. DESCRIPTION OF PROCEDURE:  The patient was brought to the operating room. After administration of anesthesia, IV antibiotics, and placement of monitoring lines, he was placed prone on the [facility] frame with a sling. His back was prepped with Betadine and sterilely draped. At this point, surgeon called a timeout, confirmed the procedure to be performed, his allergy history, and the fact that he had received his preoperative IV antibiotics. In addition, we noted the marking on the right side of his back done in preop holding to indicate the side for surgery.       A C-arm was brought in and we identified the L5-S1 level, marked it on the skin, made a midline incision over this with scalpel, dissected down through the rather thick fatty tissue with electrocautery to the deep fascia. Deep fascia was incised on the right side of spinous processes and we dissected down along the lamina out to the facet joint. We placed an angled curet in the interlaminar space. Took lateral C-arm x-ray, did confirm we were at L5-S1. We placed a self-retaining retractor. We made a hole, ligamentum flavum removed with a Kerrison punch. Eventually, we did have to do a small laminotomy at L5 on the right to get access to some epidural bleeding and to visualize the disk space well. We identified the S1 nerve root, it appeared swollen. When we retracted it, there were some epidural bleeders underneath that were hard to get. We got them bipolared as much as possible. We then incised the disk annulus kind of on the lateral aspect on the right and initially it was hard to get an instrument into the disk space. It seemed like there were spurs off the endplate, which eventually we trimmed down. We removed a lot of disk in this lateral location with pituitaries and out to the middle of the disk space. Then we started working out laterally to the disk space using down biters and micro pituitaries to remove some disk in the far lateral location. After we completed this, we could place a dental instrument out in this location and did not appear to be any compression and centrally there did not appear to be any compression. However, there was some bleeding. We cauterized once again with bipolar and we thoroughly irrigated it and then we used FloSeal to obtain further hemostasis. Eventually, all hemostasis was obtained. We then anesthetized initially the muscle on the right side with 0.5% Marcaine, then the subcutaneous tissue around the incision. A total of 30 mL of 0.5% Marcaine. We placed some vancomycin powder into the wound deep and then we closed the deep fascia with interrupted figure-of-eight stitches of #1 Vicryl.   We placed more vancomycin powder superficial to the deep fascia. Reapproximated the subcutaneous with 2 layers of 2-0 Vicryl and then we closed the skin with a running subcuticular stitch of 3-0 Vicryl. Steri-Strips and dry sterile dressings were applied. The patient was rolled supine on to the recovery room bed, having tolerated the procedure well.       Fabiana Tidwell III, MD SEL / YUSUF  D: 03/12/2018 16:32     T: 03/13/2018 01:12  JOB #: [id number]

## 2018-09-25 ENCOUNTER — HOSPITAL ENCOUNTER (OUTPATIENT)
Dept: ULTRASOUND IMAGING | Age: 38
Discharge: HOME | End: 2018-09-25
Attending: NURSE PRACTITIONER
Payer: COMMERCIAL

## 2018-09-25 DIAGNOSIS — E07.89 THYROID FULLNESS: ICD-10-CM

## 2018-09-25 PROCEDURE — 76536 US EXAM OF HEAD AND NECK: CPT

## 2018-10-30 ENCOUNTER — HOSPITAL ENCOUNTER (OUTPATIENT)
Dept: ULTRASOUND IMAGING | Age: 38
Discharge: HOME | End: 2018-10-30
Attending: INTERNAL MEDICINE
Payer: COMMERCIAL

## 2018-10-30 DIAGNOSIS — R79.89 ELEVATED SERUM CREATININE: ICD-10-CM

## 2018-10-30 PROCEDURE — 76770 US EXAM ABDO BACK WALL COMP: CPT

## 2019-02-04 ENCOUNTER — APPOINTMENT (OUTPATIENT)
Dept: GENERAL RADIOLOGY | Age: 39
End: 2019-02-04
Attending: PHYSICIAN ASSISTANT
Payer: COMMERCIAL

## 2019-02-04 ENCOUNTER — HOSPITAL ENCOUNTER (EMERGENCY)
Age: 39
Discharge: HOME | End: 2019-02-04
Attending: EMERGENCY MEDICINE | Admitting: EMERGENCY MEDICINE
Payer: COMMERCIAL

## 2019-02-04 VITALS
BODY MASS INDEX: 39.28 KG/M2 | RESPIRATION RATE: 20 BRPM | WEIGHT: 290 LBS | OXYGEN SATURATION: 97 % | DIASTOLIC BLOOD PRESSURE: 88 MMHG | TEMPERATURE: 98 F | SYSTOLIC BLOOD PRESSURE: 145 MMHG | HEIGHT: 72 IN | HEART RATE: 95 BPM

## 2019-02-04 DIAGNOSIS — T14.8XXA MUSCLE STRAIN: ICD-10-CM
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
## 2019-02-04 PROCEDURE — 72100 X-RAY EXAM L-S SPINE 2/3 VWS: CPT

## 2019-02-04 PROCEDURE — 99283 EMERGENCY DEPT VISIT LOW MDM: CPT | Performed by: PHYSICIAN ASSISTANT

## 2019-02-04 RX ORDER — METHOCARBAMOL 750 MG/1
750 TABLET, FILM COATED ORAL 3 TIMES DAILY
Qty: 30 TAB | Refills: 0 | Status: SHIPPED | OUTPATIENT
Start: 2019-02-04 | End: 2019-02-14

## 2019-02-04 RX ORDER — NAPROXEN 500 MG/1
500 TABLET ORAL 2 TIMES DAILY WITH MEALS
Qty: 20 TAB | Refills: 0 | Status: SHIPPED | OUTPATIENT
Start: 2019-02-04 | End: 2019-02-14
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
## 2019-02-04 NOTE — LETTER
[facility] EMERGENCY DEPT 
[address] [facility] [address] [place] [id number] 
[phone] Work/School Note Date: 2/4/2019 To Whom It May concern: 
 
Lauren Burns was seen and treated today in the emergency room by the following provider(s): 
Attending Provider: Jenny Long MD 
Physician Assistant: NOREEN Shahid. Lauren Burns may return to work on 2-5-19. Sincerely, NOREEN Gil

## 2019-02-04 NOTE — ED NOTES
I have reviewed discharge instructions with the patient. The patient verbalized understanding. Patient left ED via Discharge Method: ambulatory to Home with self. Opportunity for questions and clarification provided. Patient given 2 scripts. To continue your aftercare when you leave the hospital, you may receive an automated call from our care team to check in on how you are doing. This is a free service and part of our promise to provide the best care and service to meet your aftercare needs.  If you have questions, or wish to unsubscribe from this service please call [phone]. Thank you for Choosing our [facility] Emergency Department.

## 2021-07-22 NOTE — PROGRESS NOTES
Body Location Override (Optional - Billing Will Still Be Based On Selected Body Map Location If Applicable): right nasal wall [facility] [place] [facility] [address] [facility]   [address] [place] [address], [address]  [phone]   [phone]    Alvarado Hilton .  : 1980       OUTPATIENT PHYSICAL THERAPY:Initial Assessment 2017    ICD-10: Treatment Diagnosis: low back pain M54.5, radiculopathy lumbar region M54.16  Precautions/Allergies:   Penicillins and Sulfa (sulfonamide antibiotics)   Fall Risk Score: 1 (? 5 = High Risk)  MD Orders: Eval and treat MEDICAL/REFERRING DIAGNOSIS:  Radiculopathy, lumbar region [M54.16]  Other intervertebral disc degeneration, lumbar region [M51.36]   DATE OF ONSET: several months  REFERRING PHYSICIAN: Bhavna Serrano NP  RETURN PHYSICIAN APPOINTMENT: as needed     INITIAL ASSESSMENT:  Mr. Narciso Mancera presents with increased lower back pain over the past several months. He reports also having some prior history of lumbar pain. He noticed a couple of weeks ago that his pain increased and it felt burning in nature. He has had difficulty getting rid of it since then. 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 He does report lifting at work can increase his symptoms. He was diagnosed with DDD and referred to therapy to reduce his symptoms. PROBLEM LIST (Impacting functional limitations):  1. Decreased Strength  2. Decreased ADL/Functional Activities  3. Increased Pain  4. Decreased Flexibility/Joint Mobility INTERVENTIONS PLANNED:  1. Electrical Stimulation  2. Heat  3. Home Exercise Program (HEP)  4. Manual Therapy  5. Range of Motion (ROM)  6. Therapeutic Exercise/Strengthening  7. Ultrasound (US)   TREATMENT PLAN:  Effective Dates: 17 TO 17. Frequency/Duration: 2 times a week for 8 weeks and upon reassessment,  will adjust frequency and duration as progress indicates.       GOALS: (Goals have been discussed and agreed upon with patient.)  Short-Term Functional Goals: Time Frame: 4 Detail Level: Detailed weeks  1. Establish independent HEP with no cueing. 2. Pt will be able to report waking up with 4/10 pain rating or less. 3. Pt will be able to report sitting or standing for at least 45 minutes without increased pain. Discharge Goals: Time Frame: 8 weeks  1. Improve score on Oswestry by at least 5 points for improved ADL function. 2. Pt will be able to report working and performing his work functions without difficulty. 3. Pt will be able to return to prior activity level with little to no pain. Rehabilitation Potential For Stated Goals: Good  Regarding Victor M Moralesgiovanni Andrade's therapy, I certify that the treatment plan above will be carried out by a therapist or under their direction. Thank you for this referral,  Elijah Navarro, PT     Referring Physician Signature: Cullen Carpenter NP              Date                    The information in this section was collected on 11/1/17 (except where otherwise noted). HISTORY:   History of Present Injury/Illness (Reason for Referral):    Mr. Charlie Schrader, a 40year old male, presents with increased lower back pain over the past several months. He reports also having some prior history of lumbar pain. He noticed a couple of weeks ago that his pain increased and it felt burning in nature. He has had difficulty getting rid of it since then. 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 He does report lifting at work can increase his symptoms. He was diagnosed with DDD and referred to therapy to reduce his symptoms. Past Medical History/Comorbidities:   Mr. Charlie Schrader  has a past medical history of Arthritis; GERD (gastroesophageal reflux disease); HTN (hypertension); and Nausea & vomiting. He also has no past medical history of Difficult intubation; Malignant hyperthermia due to anesthesia; or Pseudocholinesterase deficiency.   Mr. Charlie Schrader  has a past surgical history that includes wisdom teeth extraction Add 60752 Cpt? (Important Note: In 2017 The Use Of 97438 Is Being Tracked By Cms To Determine Future Global Period Reimbursement For Global Periods): no and other surgical.  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 (Patient taking differently: Take 20 mg by mouth daily. Indications: hypercholesterolemia), Disp: 90 Tab, Rfl: 3    lisinopril-hydroCHLOROthiazide (PRINZIDE, ZESTORETIC) 20-25 mg per tablet, Take 1 Tab by mouth daily. , Disp: 90 Tab, Rfl: 3    amLODIPine (NORVASC) 5 mg tablet, Take 1 Tab by mouth daily. (Patient taking differently: Take 5 mg by mouth daily. Indications: hypertension), Disp: 90 Tab, Rfl: 3   Date Last Reviewed:  11/1/2017     Number of Personal Factors/Comorbidities that affect the Plan of Care: 1-2: MODERATE COMPLEXITY   EXAMINATION:   Palpation:          Mild pain noted at R PSIS and central lower back    ROM:        back ROM WFL-pain noted with FB  R hip ER 25 °  L hip IR 27 °              HS length good bilaterally. Poor hip ER bilaterally as well as decreased hip flexor flexibility                        Strength:        bilateral LE's WFL-  Slight decrease in core strength                  Special Tests: negative LLD, negative SLR  Neurological Screen: pt reports intermittent numbness in left sided toes 4 and 5  Balance:  good   Body Structures Involved:  1. Nerves  2. Bones  3. Joints  4. Muscles  5. Ligaments Body Functions Affected:  1. Sensory/Pain  2. Neuromusculoskeletal  3. Movement Related Activities and Participation Affected:  1. Mobility  2. Interpersonal Interactions and Relationships  3.  Community, Social and [place]   Number of elements (examined above) that affect the Plan of Care: 3: MODERATE COMPLEXITY   CLINICAL PRESENTATION:   Presentation: Stable and uncomplicated: LOW COMPLEXITY CLINICAL DECISION MAKING:   Outcome Measure: Tool Used: Modified Oswestry Low Back Pain Questionnaire  Score:  Initial: 12/50  Most Recent: X/50 (Date: -- )   Interpretation of Score: Each section is scored on a 0-5 scale, 5 representing the greatest disability. The scores of each section are added together for a total score of 50. Score 0 1-10 11-20 21-30 31-40 41-49 50   Modifier CH CI CJ CK CL CM CN           Medical Necessity:   · Patient is expected to demonstrate progress in strength and range of motion to improve his overall standing and walking times as well as ability to return to his prior activity level. Reason for Services/Other Comments:  · Patient continues to require skilled intervention due to ongoing lumbar pain noted with standing and walking as well as lifting. Use of outcome tool(s) and clinical judgement create a POC that gives a: Questionable prediction of patient's progress: MODERATE COMPLEXITY            TREATMENT:   (In addition to Assessment/Re-Assessment sessions the following treatments were rendered)  Pre-treatment Symptoms/Complaints:  Pt reporting like something is pushing into the middle of his lower back  Pain: Initial: Pain Intensity 1: 5  Post Session:  4/10     THERAPEUTIC EXERCISE: (20 minutes):  Exercises per grid below to improve mobility and strength. Required minimal verbal cues to promote proper body mechanics. Progressed resistance, range and repetitions as indicated. DKTC x 2 hold 15 sec  Bridging x 10  Seated piriformis stretching bilaterally 2x hold 20 sec  Standing lumbar extension x 5 hold 10 sec  Contract relax of piriformis bilaterally  Manual hip flexor stretching bilaterally  Evaluation (  xx   ):    Manual Therapy (  na   ):na  Therapeutic Modalities:  IFC e-stim x 15 minutes to lower back with pt in prone in order to reduce his overall pain.   Pillow placed under hips to reduce extensive lordosis    HEP: Access Code: [email or web address]   URL: [email or web address]   Date: 11/01/2017   Prepared by: Ricky Many     Exercises  Seated Piriformis Stretch - 5 reps - 1 sets - 20 sec hold - 2x daily  Supine Bridge - 15 reps - 2 sets - 2x daily  Supine Double Knee to Chest - 5 reps - 1 sets - 15-20 sec hold - 2x daily  Standing Lumbar Extension with Counter - 10 reps - 1 sets - 5-10 sec hold - 2x dailyAs above; handouts given to patient for all exercises. Treatment/Session Assessment:    · Response to Treatment:  Pt responded well to treatment. He had decreased pain and had a slight increase in his FB motion after stretching and contract relax. Pt does have extensive muscle tone and decreased overall flexibility in the hips and buttocks. Pt will benefit from stretching and core strengthening to help him return to his prior activity level. · Compliance with Program/Exercises: Will assess as treatment progresses. · Recommendations/Intent for next treatment session: \"Next visit will focus on advancements to more challenging activities\".   Total Treatment Duration: 60 min  PT Patient Time In/Time Out  Time In: 1100  Time Out: 1200  Treatment number [address] [facility] PT

## 2022-03-19 PROBLEM — G47.33 OSA (OBSTRUCTIVE SLEEP APNEA): Status: ACTIVE | Noted: 2017-06-22

## 2022-03-19 PROBLEM — M51.26 LUMBAR HERNIATED DISC: Status: ACTIVE | Noted: 2018-03-12

## 2022-03-19 PROBLEM — E66.9 OBESITY (BMI 30-39.9): Status: ACTIVE | Noted: 2017-06-22

## 2022-03-20 PROBLEM — G47.10 HYPERSOMNIA: Status: ACTIVE | Noted: 2017-06-22

## 2022-07-28 ENCOUNTER — OFFICE VISIT (OUTPATIENT)
Dept: INTERNAL MEDICINE CLINIC | Facility: CLINIC | Age: 42
End: 2022-07-28
Payer: COMMERCIAL

## 2022-07-28 VITALS
RESPIRATION RATE: 16 BRPM | BODY MASS INDEX: 41.77 KG/M2 | DIASTOLIC BLOOD PRESSURE: 94 MMHG | HEIGHT: 72 IN | OXYGEN SATURATION: 99 % | HEART RATE: 72 BPM | WEIGHT: 308.4 LBS | SYSTOLIC BLOOD PRESSURE: 136 MMHG | TEMPERATURE: 97.2 F

## 2022-07-28 DIAGNOSIS — N18.31 STAGE 3A CHRONIC KIDNEY DISEASE (HCC): ICD-10-CM

## 2022-07-28 DIAGNOSIS — G89.29 CHRONIC PAIN OF RIGHT KNEE: ICD-10-CM

## 2022-07-28 DIAGNOSIS — M25.561 CHRONIC PAIN OF RIGHT KNEE: ICD-10-CM

## 2022-07-28 DIAGNOSIS — F41.9 ANXIETY AND DEPRESSION: ICD-10-CM

## 2022-07-28 DIAGNOSIS — R73.01 IMPAIRED FASTING GLUCOSE: ICD-10-CM

## 2022-07-28 DIAGNOSIS — F32.A ANXIETY AND DEPRESSION: ICD-10-CM

## 2022-07-28 DIAGNOSIS — G47.33 OSA (OBSTRUCTIVE SLEEP APNEA): ICD-10-CM

## 2022-07-28 DIAGNOSIS — I10 PRIMARY HYPERTENSION: Primary | ICD-10-CM

## 2022-07-28 DIAGNOSIS — E66.01 MORBID OBESITY (HCC): ICD-10-CM

## 2022-07-28 DIAGNOSIS — E78.2 MIXED HYPERLIPIDEMIA: ICD-10-CM

## 2022-07-28 PROBLEM — E78.5 HLD (HYPERLIPIDEMIA): Status: ACTIVE | Noted: 2021-08-05

## 2022-07-28 PROBLEM — I21.4 NSTEMI (NON-ST ELEVATED MYOCARDIAL INFARCTION) (HCC): Status: ACTIVE | Noted: 2021-08-05

## 2022-07-28 PROCEDURE — 99214 OFFICE O/P EST MOD 30 MIN: CPT | Performed by: NURSE PRACTITIONER

## 2022-07-28 ASSESSMENT — PATIENT HEALTH QUESTIONNAIRE - PHQ9
SUM OF ALL RESPONSES TO PHQ9 QUESTIONS 1 & 2: 0
1. LITTLE INTEREST OR PLEASURE IN DOING THINGS: 0
SUM OF ALL RESPONSES TO PHQ QUESTIONS 1-9: 0
2. FEELING DOWN, DEPRESSED OR HOPELESS: 0

## 2022-07-28 ASSESSMENT — ENCOUNTER SYMPTOMS
VOMITING: 0
SHORTNESS OF BREATH: 0
DIARRHEA: 0
NAUSEA: 0
SORE THROAT: 0
WHEEZING: 0
ABDOMINAL PAIN: 0
CONSTIPATION: 0
COUGH: 0

## 2022-07-28 NOTE — PROGRESS NOTES
[facility]      2022    Patient Name: Geralidne Lara  :  1980      Chief Complaint:  Chief Complaint   Patient presents with    Follow-up     3 month           HPI  Patient presents today for follow-up on chronic conditions. Diastolic BP elevated today at 94. Patient admits that he does not check his BP at home. He reports drinking a coffee from [employer] this morning that he feels may have contributed to the elevation. He is compliant with his medications. He reports rare headaches but denies any CP, dizziness, SOB, edema. Most recent lipid panel (2022) reviewed today---total cholesterol 166. HDL 36, triglycerides 180, LDL 97. Patient reports that be exercises several days per week and has recently been trying to incorporate more cardio exercise. He says that he eats a fairly healthy diet most days of the week but admits to 1-2 \"cheat days\" per week. Patient reports having a borderline positive sleep study for sleep apnea a few years ago. He was started on CPAP several months ago but admits that he is not wearing it anymore. He takes Ambien only as needed for insomnia. History of anxiety and depression. He is happy with current dose of Zoloft and feels that he controls his mood and anxiety fairly well. Lastly, patient reports intermittent right knee pain. This is a chronic problem that comes and goes. X-ray in 2020 revealed only a bone spur along the inferior aspect of the patella. The pain is located medially and has been bothering him more recently since he has increased his cardio exercise.        Past Medical History:   Diagnosis Date    Arthritis     shoulder    COVID-19 2020    GERD (gastroesophageal reflux disease)     otc med prn    HTN (hypertension)     managed with meds    Nausea & vomiting     Sleep apnea        Past Surgical History:   Procedure Laterality Date    OTHER SURGICAL HISTORY      testicle twisted at age 7    SHOULDER ARTHROSCOPY      rt elbow    WISDOM TOOTH EXTRACTION         Family History   Problem Relation Age of Onset    No Known Problems Brother     Breast Cancer Mother     Cancer Mother     No Known Problems Sister     Hypertension Mother     Hypertension Father     Heart Disease Father         Enlarged Heart       Social History     Tobacco Use    Smoking status: Some Days     Packs/day: 0.25     Types: Cigarettes     Last attempt to quit: 2018     Years since quittin.5    Smokeless tobacco: Never   Substance Use Topics    Alcohol use: Yes    Drug use: No         Current Outpatient Medications:     amLODIPine (NORVASC) 5 MG tablet, Take 5 mg by mouth daily, Disp: , Rfl:     atorvastatin (LIPITOR) 80 MG tablet, Take 80 mg by mouth daily, Disp: , Rfl:     carvedilol (COREG) 12.5 MG tablet, Take 12.5 mg by mouth 2 times daily (with meals), Disp: , Rfl:     lisinopril-hydroCHLOROthiazide (PRINZIDE;ZESTORETIC) 20-25 MG per tablet, Take 1 tablet by mouth daily, Disp: , Rfl:     sertraline (ZOLOFT) 50 MG tablet, Take 50 mg by mouth daily, Disp: , Rfl:     zolpidem (AMBIEN) 5 MG tablet, Take 5 mg by mouth., Disp: , Rfl:     Allergies   Allergen Reactions    Penicillins Hives    Sulfa Antibiotics Hives       Review of Systems   Constitutional:  Negative for chills and fever. HENT:  Negative for congestion, ear pain and sore throat. Eyes:  Negative for visual disturbance. Respiratory:  Negative for cough, shortness of breath and wheezing. Cardiovascular:  Negative for chest pain, palpitations and leg swelling. Gastrointestinal:  Negative for abdominal pain, constipation, diarrhea, nausea and vomiting. Genitourinary:  Negative for dysuria and hematuria. Musculoskeletal:  Positive for arthralgias. Negative for gait problem. Neurological:  Negative for dizziness, light-headedness and headaches. Psychiatric/Behavioral:  Negative for dysphoric mood. The patient is nervous/anxious.         Objective:  BP (!) 136/94 (Site: Left Upper Arm, Position: Sitting, Cuff Size: Large Adult)   Pulse 72   Temp 97.2 °F (36.2 °C) (Temporal)   Resp 16   Ht 6' (1.829 m)   Wt (!) 308 lb 6.4 oz (139.9 kg)   SpO2 99%   BMI 41.83 kg/m²     Examination:  Physical Exam  Vitals and nursing note reviewed. Constitutional:       General: He is not in acute distress. Appearance: Normal appearance. HENT:      Right Ear: Tympanic membrane, ear canal and external ear normal.      Left Ear: Tympanic membrane, ear canal and external ear normal.      Nose: Nose normal.      Mouth/Throat:      Mouth: Mucous membranes are moist.      Pharynx: Oropharynx is clear. Cardiovascular:      Rate and Rhythm: Normal rate and regular rhythm. Pulses:           Dorsalis pedis pulses are 2+ on the right side and 2+ on the left side. Heart sounds: Normal heart sounds. Pulmonary:      Effort: Pulmonary effort is normal. No respiratory distress. Breath sounds: Normal breath sounds. Abdominal:      General: Bowel sounds are normal.      Palpations: Abdomen is soft. Tenderness: There is no abdominal tenderness. Musculoskeletal:      Right knee: No swelling or effusion. Normal range of motion. Tenderness (medially) present. Right lower leg: No edema. Left lower leg: No edema. Skin:     General: Skin is warm and dry. Neurological:      Mental Status: He is alert and oriented to person, place, and time. Psychiatric:         Mood and Affect: Mood normal.         Assessment/Plan:    Laurence Perez was seen today for follow-up. Diagnoses and all orders for this visit:    Primary hypertension  -     CBC with Auto Differential; Future  -     Comprehensive Metabolic Panel; Future  -     Lipid Panel; Future  -     TSH; Future  -     Urinalysis; Future  -     Hemoglobin P8P; Future  Diastolic elevated today. Recommended that patient begin monitoring BP at home 2-3 times weekly. Call for readings consistently greater than 140/90. Discussed at length the importance of healthy diet, exercise and weight loss for optimal BP management. Mixed hyperlipidemia  -     CBC with Auto Differential; Future  -     Comprehensive Metabolic Panel; Future  -     Lipid Panel; Future  -     TSH; Future  -     Urinalysis; Future  -     Hemoglobin A1C; Future  Continue atorvastatin at current dose. Recheck levels prior to next visit. Stage 3a chronic kidney disease ([facility] [address]  -     Comprehensive Metabolic Panel; Future  Continue to monitor. Morbid obesity ([facility] [address]  Weight loss encouraged. RUBEN (obstructive sleep apnea)  Encouraged compliance with CPAP. Anxiety and depression  Continue sertraline at current dose. Impaired fasting glucose  -     Hemoglobin A1C; Future    Chronic pain of right knee  Recommended trial of low-impact cardio exercise such as elliptical or stair climber. OTC NSAID as needed. If symptoms worsen or fail to improve, consider PT vs ortho referral.         On this date, 7/28/22, I have spent 35 minutes reviewing previous notes, test results and face to face with the patient discussing the diagnosis and importance of compliance with the treatment plan as well as documenting on the day of the visit. An electronic signature was used to authenticate this note.   SU Odell

## 2022-11-13 NOTE — PROGRESS NOTES
Hudson Cyr (:  1980) is a 40 y.o. male,Established patient, here for evaluation of the following chief complaint(s):  Elbow Problem (Tingling, pain and hand goes numb in left elbow), Sinus Problem, Insomnia, and Medication Refill         ASSESSMENT/PLAN:  1. Left elbow pain  2. Numbness of left hand  Suspect underlying cubital tunnel syndrome on the left with possible medial versus lateral epicondylitis  Prescribe trial of meloxicam, set up for nerve conduction study to confirm radiculopathy and also to confirm level of impingement  Refer to orthopedic surgery for further evaluation and management    - meloxicam (MOBIC) 15 MG tablet; Take 1/2-1 whole tablet once a day as needed for elbow pain. Maximum of 1 whole tablet per 24 hours. Avoid other NSAIDs while on this medication. Tylenol is okay. Take with food  Dispense: 90 tablet; Refill: 2  - [address]  - [facility], (NCS/Nerve Conduction)    3. Chronic insomnia  PDMP reviewed  No apneic episodes, history of borderline sleep apnea in the past however not currently on CPAP  Given patient more often than not is benefiting from equivalent of 10 mg we will send new prescription for 10 mg tablets with patient advised not to take alongside alcohol    - zolpidem (AMBIEN) 10 MG tablet; Take 1 tablet by mouth nightly as needed for Sleep for up to 90 days. Dispense: 30 tablet; Refill: 2    4.  Upper respiratory tract infection, unspecified type  Suspect viral upper respiratory infection  Patient respectfully declines COVID and influenza testing  Recommended over-the-counter antihistamine such as Allegra versus Claritin versus Zyrtec in addition to intranasal steroid such as Flonase or Nasacort  Should symptoms fail to improve or worsen over the course of this week patient is to contact provider at which point we may consider empiric antibiotics for bacterial sinusitis      No follow-ups on file. Subjective   SUBJECTIVE/OBJECTIVE:  Patient is a 71-year-old -American male who presents to the office today for multiple concerns. Patient states for the past month and a half he has had left elbow pain on the medial and lateral aspect sometimes radiating down to his hand. Symptoms are worse with prolonged flexion. Not improved with trials of ice and ibuprofen. Also has intermittent episodes of numbness in the left fourth and fifth fingers. Does have chronic neck pain and stiffness for years having played high school football but no documented major trauma or injury. Has been noticing decreased  strength in his left hand but no elbow swelling or redness. Does have a history of cubital tunnel syndrome in his right elbow stating this presentation is somewhat but not completely similar. Patient also request refill on Ambien currently on 5 mg tablets sometimes having to take 2 tablets at night to help him sleep. Did have a sleep study in the past showing borderline sleep apnea. His wife occasionally notes that he will snore but no apneic episodes. Generally patient feels refreshed during the day. No sleepwalking from use of Ambien and does not take alongside alcohol. Patient also reports that for the past 5 days or so he has had mild sore throat, sinus pressure, brown nasal discharge with some postnasal drip and cough worse at nighttime with some left ear pain. No fevers, hemoptysis, shortness of breath, chest pain or myalgias. Has been taking over-the-counter cough medicine. States his wife has some similar symptoms. Review of Systems   Constitutional:  Negative for fever. HENT:  Positive for congestion, ear pain, postnasal drip, sinus pressure, sinus pain and sore throat. Respiratory:  Positive for cough. Negative for shortness of breath. Positive for sputum production but denies hemoptysis   Cardiovascular:  Negative for chest pain. Musculoskeletal:  Positive for arthralgias and neck pain (Chronic). Negative for joint swelling and myalgias. Neurological:  Positive for weakness (Left hand) and numbness. Objective   Physical Exam  Vitals reviewed. Constitutional:       General: He is not in acute distress. Appearance: Normal appearance. He is not ill-appearing, toxic-appearing or diaphoretic. HENT:      Head: Normocephalic and atraumatic. Right Ear: Tympanic membrane, ear canal and external ear normal. There is no impacted cerumen. Left Ear: Tympanic membrane, ear canal and external ear normal. There is no impacted cerumen. Nose: Congestion present. Mouth/Throat:      Pharynx: Posterior oropharyngeal erythema present. No oropharyngeal exudate. Eyes:      General:         Right eye: No discharge. Left eye: No discharge. Extraocular Movements: Extraocular movements intact. Cardiovascular:      Rate and Rhythm: Normal rate and regular rhythm. Pulses:           Radial pulses are 2+ on the left side. Heart sounds: No murmur heard. No friction rub. No gallop. Pulmonary:      Effort: Pulmonary effort is normal. No respiratory distress. Breath sounds: No wheezing, rhonchi or rales. Musculoskeletal:      Cervical back: Neck supple. No tenderness. Comments: Mild tenderness to palpation over left lateral epicondyle of elbow  Passive flexion and extension of both elbows preserved however some pain noted with passive range of motion testing on the left   strength 5/5 bilaterally as well as in second through fifth finger abduction and adduction  Negative Spurling's test on the left   Lymphadenopathy:      Cervical:      Right cervical: No superficial cervical adenopathy. Left cervical: No superficial cervical adenopathy. Skin:     General: Skin is warm and dry. Coloration: Skin is not jaundiced. Neurological:      Mental Status: He is alert.       Sensory: No sensory deficit. Psychiatric:         Attention and Perception: Attention normal.         Mood and Affect: Mood and affect normal. Mood is not anxious or depressed. Affect is not tearful. Speech: Speech normal. Speech is not rapid and pressured or slurred. Behavior: Behavior normal. Behavior is not agitated, aggressive or combative. Behavior is cooperative. Thought Content: Thought content normal.                An electronic signature was used to authenticate this note.     --Kyle Bilberry, DO

## 2022-11-15 ENCOUNTER — OFFICE VISIT (OUTPATIENT)
Dept: INTERNAL MEDICINE CLINIC | Facility: CLINIC | Age: 42
End: 2022-11-15
Payer: COMMERCIAL

## 2022-11-15 VITALS
BODY MASS INDEX: 41.58 KG/M2 | SYSTOLIC BLOOD PRESSURE: 140 MMHG | OXYGEN SATURATION: 96 % | WEIGHT: 307 LBS | TEMPERATURE: 98.2 F | DIASTOLIC BLOOD PRESSURE: 88 MMHG | HEART RATE: 90 BPM | HEIGHT: 72 IN

## 2022-11-15 DIAGNOSIS — F51.04 CHRONIC INSOMNIA: Primary | ICD-10-CM

## 2022-11-15 DIAGNOSIS — R20.0 NUMBNESS OF LEFT HAND: ICD-10-CM

## 2022-11-15 DIAGNOSIS — M25.522 LEFT ELBOW PAIN: ICD-10-CM

## 2022-11-15 DIAGNOSIS — J06.9 UPPER RESPIRATORY TRACT INFECTION, UNSPECIFIED TYPE: ICD-10-CM

## 2022-11-15 PROCEDURE — 99214 OFFICE O/P EST MOD 30 MIN: CPT | Performed by: STUDENT IN AN ORGANIZED HEALTH CARE EDUCATION/TRAINING PROGRAM

## 2022-11-15 PROCEDURE — 3074F SYST BP LT 130 MM HG: CPT | Performed by: STUDENT IN AN ORGANIZED HEALTH CARE EDUCATION/TRAINING PROGRAM

## 2022-11-15 PROCEDURE — 3079F DIAST BP 80-89 MM HG: CPT | Performed by: STUDENT IN AN ORGANIZED HEALTH CARE EDUCATION/TRAINING PROGRAM

## 2022-11-15 RX ORDER — ZOLPIDEM TARTRATE 5 MG/1
5 TABLET ORAL NIGHTLY PRN
Qty: 30 TABLET | Refills: 2 | Status: CANCELLED | OUTPATIENT
Start: 2022-11-15 | End: 2022-12-15

## 2022-11-15 RX ORDER — ZOLPIDEM TARTRATE 10 MG/1
10 TABLET ORAL NIGHTLY PRN
Qty: 30 TABLET | Refills: 2 | Status: SHIPPED | OUTPATIENT
Start: 2022-11-15 | End: 2023-02-13

## 2022-11-15 RX ORDER — MELOXICAM 15 MG/1
TABLET ORAL
Qty: 90 TABLET | Refills: 2 | Status: SHIPPED | OUTPATIENT
Start: 2022-11-15

## 2022-11-15 ASSESSMENT — ENCOUNTER SYMPTOMS
SHORTNESS OF BREATH: 0
SORE THROAT: 1
SINUS PRESSURE: 1
COUGH: 1
SINUS PAIN: 1

## 2022-11-16 ENCOUNTER — OFFICE VISIT (OUTPATIENT)
Dept: ORTHOPEDIC SURGERY | Age: 42
End: 2022-11-16
Payer: COMMERCIAL

## 2022-11-16 DIAGNOSIS — G56.22 ULNAR NEUROPATHY AT ELBOW, LEFT: ICD-10-CM

## 2022-11-16 DIAGNOSIS — M77.02 MEDIAL EPICONDYLITIS OF ELBOW, LEFT: ICD-10-CM

## 2022-11-16 DIAGNOSIS — M77.12 LEFT LATERAL EPICONDYLITIS: Primary | ICD-10-CM

## 2022-11-16 PROCEDURE — L3762 EO RIGID W/O JOINTS PRE OTS: HCPCS | Performed by: ORTHOPAEDIC SURGERY

## 2022-11-16 PROCEDURE — 99204 OFFICE O/P NEW MOD 45 MIN: CPT | Performed by: ORTHOPAEDIC SURGERY

## 2022-11-16 PROCEDURE — L3908 WHO COCK-UP NONMOLDE PRE OTS: HCPCS | Performed by: ORTHOPAEDIC SURGERY

## 2022-11-16 PROCEDURE — 20551 NJX 1 TENDON ORIGIN/INSJ: CPT | Performed by: ORTHOPAEDIC SURGERY

## 2022-11-16 RX ORDER — MELOXICAM 15 MG/1
15 TABLET ORAL DAILY
Qty: 30 TABLET | Refills: 1 | Status: CANCELLED | OUTPATIENT
Start: 2022-11-16 | End: 2022-12-16

## 2022-11-16 RX ORDER — MELOXICAM 15 MG/1
15 TABLET ORAL DAILY
Qty: 30 TABLET | Refills: 0 | Status: SHIPPED | OUTPATIENT
Start: 2022-11-16 | End: 2022-12-16

## 2022-11-16 RX ORDER — BETAMETHASONE SODIUM PHOSPHATE AND BETAMETHASONE ACETATE 3; 3 MG/ML; MG/ML
12 INJECTION, SUSPENSION INTRA-ARTICULAR; INTRALESIONAL; INTRAMUSCULAR; SOFT TISSUE ONCE
Status: COMPLETED | OUTPATIENT
Start: 2022-11-16 | End: 2022-11-16

## 2022-11-16 RX ADMIN — BETAMETHASONE SODIUM PHOSPHATE AND BETAMETHASONE ACETATE 12 MG: 3; 3 INJECTION, SUSPENSION INTRA-ARTICULAR; INTRALESIONAL; INTRAMUSCULAR; SOFT TISSUE at 15:25

## 2022-11-16 NOTE — PROGRESS NOTES
Orthopaedic Hand Surgery Note    Name: Naye Little  YOB: 1980  Gender: male  MRN: [id number]    CC: New patient referred for elbow pain    HPI: Patient is a 40 y.o. male right hand dominant with a chief complaint of left elbow pain. Pain is located on the lateral side of the elbow and radiates down the forearm, denies numbness and paresthesias. Symptoms have been going on for  3 months, pain is aggravated with lifting heavy objects, alleviated by rest. Other complaints include medial sided elbow pain, numbness and paresthesias in an ulnar distribution of the left hand. Patient had a history of right shoulder surgery by Dr. Bautista Gill as well as cubital tunnel release by Dr. Hayden Tavarez, he was told he had arthritis in his shoulder as well as arthritis in his low back since the age of 41 or 46, patient reports stiffness of the left arm in the mornings not as much in the hand. The current symptoms are rated a 7/10 and interfere with ADLs and lifting. ROS/Meds/PSH/PMH/FH/SH: I personally reviewed the patients standard intake form. Pertinents are discussed in the HPI    Physical Examination:  General: Awake and alert. HEENT: Normocephalic, atraumatic  CV/Pulm: Breathing even and unlabored  Skin: No obvious rashes noted. Lymphatic: No obvious evidence of lymphedema or lymphadenopathy    Musculoskeletal:   Examination on the left upper extremity demonstrates normal sensation to light touch in the median, ulnar and radial distribution. There is severe tenderness on the lateral epicondyle, moderate tenderness on the medial epicondyle no tenderness of the olecranon, pain is aggravated by chairlift test and resisted wrist extension, there is no pain with palpation of the radial tunnel, negative Tinel along the radial nerve tract, no pain with resisted supination, no pain with resisted pronation.   Positive Tinel at the cubital tunnel, positive elbow flexion compression test    Imaging / Electrodiagnostic Tests:     none    Assessment:   1. Left lateral epicondylitis    2. Medial epicondylitis of elbow, left    3. Ulnar neuropathy at elbow, left        Plan:  We discussed the diagnosis and different treatment options. We discussed observation, bracing, cortisone injections, therapy and lateral epicondyle debridement with tendon reattachment surgery. We discussed that lateral epicondylitis is a chronic condition that has been progressing for much longer than the symptoms were evident, this is caused by degeneration of the tendon due to poor vascular supply that occurred over a long period of time, the patient understands that this condition will likely persist for a long time without medical treatment but that a large percentage of patients report improvement of symptoms with conservative measures including injections, braces, therapy and antiinflammatories. However, a small percentage of patients require surgical intervention at some point despite some short-term benefits of conservative treatment. After discussing the risks, benefits and alternatives of all treatment options in detail, the patient elects for left lateral epicondyle steroid injection, Mobic 15 daily for 6 weeks, wrist brace for nighttime use and when driving, left elbow cubital tunnel brace for nighttime use, home exercise program was provided. 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   I offered to obtain inflammatory labs but the patient politely declined, this is something we will keep in mind moving forward. I will reassess in 2 months. Procedure Note    The risk, benefits and alternatives of injection and no injection therapy were discussed. The patient consented for an injection. The patient has been identified by name and birthdate. The injection site was identified, marked and prepped with alcohol swabs. Time out completed. The Left lateral epicondyle was injected with a 22 gauge needle with 2ml of 6mg/ml Betamethasone and 2ml Lidocaine plain 1%. After the area was numb, the 22 gauge needle was used to break down scar tissue and to create a bleeding bony bed to aid in tendon healing. The injection site was then dressed with a bandaid. The patient tolerated the injection well. The patient was instructed to monitor their blood sugars if diabetic and call if any concerns. The patient was instructed to call the office if any adverse local effects occurred or any if any questions or concerns arise. Patient voiced accordance and understanding of the information provided and the formulated plan. All questions were answered to the patient's satisfaction during the encounter.     Yung Gao MD  Orthopaedic Surgery  11/16/22  4:07 PM

## 2022-11-16 NOTE — LETTER
DME Patient Authorization Form    Name: Ofelia Seha  : 1980  MRN: [id number]   Age: 40 y.o. Gender: male  Delivery Address: [facility]     Diagnosis:     ICD-10-CM    1. Left lateral epicondylitis  M77.12       2. Medial epicondylitis of elbow, left  M77.02       3. Ulnar neuropathy at elbow, left  G56.22            Requested DME:  GelRod Elbow Orthosis - [id number] ($147.00) 1 - left  Wrist Lacer- ($65.00) X 1 - left        Clinical Notes:     **Indicates non-covered items by insurance. Payment expected on date of service. Electronically signed by  Provider: Monica Reyna MD__Date: 2022                            [address] Tax ID # [id number]        Durable Medical Equipment and/or Orthotics Patient Consent     I understand that my physician has prescribed this medical supply as part of my treatment plan as a matter of Medical Necessity.  I understand that I have a choice in where I receive my prescribed orthopedic supplies and/or services.  I authorize [facility] to furnish this service/product and to provide my insurance carrier with any information requested in order to process for payment.  I instruct my insurance carrier to pay [facility] directly for these services/products.  I understand that my insurance carrier may deny payment for this supply because it is a non-covered item, deemed not medically necessary or considered experimental.   I understand that any cost not covered by my insurance carrier will be solely my financial responsibility.  I have received the Supplier Standards and have reviewed them.  I have received the prescribed item and have been fully instructed on the proper use of the above services/products.    ______ (Patient Initials) I understand that all DME items are non-returnable after being dispensed.  Items still in sealed packaging may be returned up to 14 days after purchasing. [address] will replace items that are defective.    ______ (Patient Initials) I understand that [facility] will not file a claim with my insurance carrier for this service/product and I am waiving my right to file a claim on my own for this service/product with my insurance company as this item is NON-COVERED (Denoted by the **) by my Insurance company/policy. ______ (Patient Initials) I understand that I am responsible to bring my equipment to the hospital for any surgery. ______________________________________________  ________________________  Patient / Mj Coleman            Thank you for considering [address] Your physician has prescribed specific medical equipment or devices for your home use. The following describes your rights and responsibilities as our customer. Right to Choose Providers: You have a choice regarding which company supplies your home medical equipment and devices, and to consult your physician in this decision. You may choose a medical supply store, a home medical equipment provider, or a specialist such as [facility]. [facility] will coordinate with your physician to provide the medical equipment or devices prescribed for your home use. Right to Service:  You have the right to considerate, respectful and nondiscriminatory care. You have the right to receive accurate and easily understood information about your health care. If you speak a foreign language, or don't understand the discussions, assistance will be provided to allow you to make informed health care decisions. You have the right to know your treatment options and to participate in decisions about your care, including the right to accept or refuse treatment.   You have the right to expect a reasonable response to your requests for treatment or service. You have the right to talk in confidence with health care providers and to have your health care information protected. You have the right to receive an explanation of your bill. You have the right to complain about the service or product you receive. Patient Responsibilities:  Please provide complete and accurate information about your health insurance benefits and make arrangements for the timely payment of your bill. [facility] will, if possible, assume responsibility for billing your insurance (Medicare, Medicaid and commercial) for the prescribed equipment or devices. If your policy does not cover the prescribed product, or only covers a portion of the bill, you are responsible for any remaining balance. Return and Exchange Policy:  [facility] will honor published  Warranties for products. [facility] will accept returns or exchanges within 14 days from the date of receipt, providin) the product must be in new condition; 2) receipt as required; and 3) used disposable and hygiene products may only be returned due to a defective product. Note: Refunds will be issued in a timely manner, please allow 4-6 weeks for processing. Complaint Procedures and DME Consumer Protection Resources:  [facility] values you as a customer, and is committed to resolving patient concerns. This commitment includes understanding and documenting your concerns, conducting a review of internal procedures, and providing you with an explanation and resolution to your concerns. Should you have any questions about our services or billing process, please contact our office at (practice phone number). If we are unable to resolve the concern, you have the right to direct comments to the office of Consumer Protection, in the [address] [facility] [address] or the [facility] office, without fear of repercussion.     [address] [facility]    A supplier must be in compliance with all applicable Federal and State licensure and regulatory requirements. A supplier must provide complete and accurate information on the DMEPOS supplier application. Any changes to this information must be reported to the [facility] within 30 days. An authorized individual (one whose signature is binding) must sign the application for billing privileges. A supplier must fill orders from its own inventory, or must contract with other companies for the purchase of items necessary to fill the order. A supplier may not contract with any entity that is currently excluded from the Medicare program, any [facility] program, or from any other Federal procurement or Nonprocurement programs. A supplier must advise beneficiaries that they may rent or purchase inexpensive or routinely purchased durable medical equipment, and of the purchase option for capped rental equipment. A supplier must notify beneficiaries of warranty coverage and honor all warranties under applicable State Law, and repair or replace free of charge Medicare covered items that are under warranty. A supplier must maintain a physical facility on an appropriate site. A supplier must permit CMS, or its agents to conduct on-site inspections to ascertain the supplier's compliance with these standards. The supplier location must be accessible to beneficiaries during reasonable business hours, and must maintain a visible sign and posted hours of operation. A supplier must maintain a primary business telephone listed under the name of the business in a [employer] or a toll free number available through directory assistance. The exclusive use of a beeper, answering machine or cell phone is prohibited. A supplier must have comprehensive liability insurance in the amount of at least $300,000 that covers both the supplier's place of business and all customers and employees of the supplier.   If the supplier manufactures its own items, this insurance must also cover product liability and completed operations. A supplier must agree not to initiate telephone contact with beneficiaries, with a few exceptions allowed. This standard prohibits suppliers from calling beneficiaries in order to solicit new business. A supplier is responsible for delivery and must instruct beneficiaries on use of Medicare covered items, and maintain proof of delivery. A supplier must answer questions, and respond to complaints of the beneficiaries, and maintain documentation of such contacts. A supplier must maintain and replace at no charge or repair directly, or through a service contract with another company, Medicare covered items it has rented to beneficiaries. A supplier must accept returns of substandard (less than full quality for the particular item) or unsuitable items (inappropriate for the beneficiary at the time it was fitted and rented or sold) from beneficiaries. A supplier must disclose these supplier standards to each beneficiary to whom it supplies a Medicare-covered item. A supplier must disclose to the government any person having ownership, financial, or control interest in the supplier. A supplier must not convey or reassign a supplier number; i.e., the supplier may not sell or allow another entity to use its Medicare billing number. A supplier must have a complaint resolution protocol established to address beneficiary complaints that relate to these standards. A record of these complaints must be maintained at the physical facility. Complaint records must include: the name, address, telephone number and health insurance claim number of the beneficiary, a summary of the complaint, and any action taken to resolve it. A supplier must agree to furnish CMS any information required by the Medicare statute and implementing regulations.   A supplier of DMEPOS and other items and services must be accredited by a CMS-approved accreditation organization in order to receive and retain a supplier billing number. The accreditation must indicate the specific products and services, for which the supplier is accredited in order for the supplier to receive payment for those specific products and services. A DMEPOS supplier must notify their accreditation organization when a new DMEPOS location is opened. The accreditation organization may accredit the new supplier location for three months after it is operational without requiring a new site visit. All DMEPOS supplier locations, whether owned or subcontracted, must meet the [employer] and be separately accredited in order to bill Medicare. An accredited supplier may be denied enrollment or their enrollment may be revoked, if CMS determines that they are not in compliance with the DMEPOS quality standards. A DMEPOS supplier must disclose upon enrollment all products and services, including the addition of new product lines for which they are seeking accreditation. If a new product line is added after enrollment, the DMEPOS supplier will be responsible for notifying the accrediting body of the new product so that the DMEPOS supplier can be re-surveyed and accredited for these new products. Must meet the surety bond requirements specified in 42 C. F.R. 424.57(c). Implementation date- May 4, 2009. A supplier must obtain oxygen from a state-licensed oxygen supplier. A supplier must maintain ordering and referring documentation consistent with provisions found in 42 C. F.R. 424.516(f). DMEPOS suppliers are prohibited from sharing a practice location with certain other Medicare providers and suppliers. DMEPOS suppliers must remain open to the public for a minimum of 30 hours per week with certain exceptions.

## 2022-11-21 NOTE — PROGRESS NOTES
Patient was fitted and instructed on an  Wrist Splint for patients left elbow. Patient is aware the hand slides in the brace with the thumb placed threw the thumb hole. I demonstrated the correct way to tighten the brace straps to allow for a comfortable fit. Patient understood the correct way to wear the brace. The patient was also prescribed and fitted with a gelrod elbow orthosis for the left elbow. Patient read and signed documenting they understand and agree to [facility]'s current DME return policy.

## 2023-01-05 RX ORDER — LISINOPRIL AND HYDROCHLOROTHIAZIDE 25; 20 MG/1; MG/1
1 TABLET ORAL DAILY
Qty: 90 TABLET | Refills: 2 | Status: SHIPPED | OUTPATIENT
Start: 2023-01-05

## 2023-01-05 RX ORDER — CARVEDILOL 12.5 MG/1
12.5 TABLET ORAL 2 TIMES DAILY WITH MEALS
Qty: 180 TABLET | Refills: 2 | Status: SHIPPED | OUTPATIENT
Start: 2023-01-05

## 2023-01-05 RX ORDER — AMLODIPINE BESYLATE 5 MG/1
5 TABLET ORAL DAILY
Qty: 90 TABLET | Refills: 2 | Status: SHIPPED | OUTPATIENT
Start: 2023-01-05

## 2023-02-27 NOTE — PROGRESS NOTES
Addison Mcneil (:  1980) is a 40 y.o. male,Established patient, here for evaluation of the following chief complaint(s):  Abdominal Pain (Having pain in RUQ under ribs. /Was told once that he may have GERD./States has been going on for a while. Never really gets any relief. Has been over a year. /States no other symptoms. Feels like a constant pressure. More discomfort than pain. )         ASSESSMENT/PLAN:  1. RUQ abdominal pain  Frontal diagnosis includes biliary colic/biliary dyskinesia versus IBS versus gastritis  Obtain right upper quadrant ultrasound to evaluate for presence of gallstones and biliary ductal dilatation  Obtain HIDA scan to evaluate functional capacity of gallbladder  Start trial of omeprazole for possible gastritis component (counseled patient that we ideally do not want him on this long-term given potential risks associated with prolonged PPI use)  If HIDA scan positive or right upper quadrant ultrasound with signs of cholecystitis refer to general surgery  If right upper quadrant ultrasound and HIDA scan unremarkable and symptoms not improved with trial of prescription strength PPI consider trial of antispasmodic such as Levsin or Bentyl for possible IBS    - US ABDOMEN LIMITED; Future  - NM HEPATOBILIARY SCAN W EJECTION FRACTION; Future  - omeprazole (PRILOSEC) 40 MG delayed release capsule; Take 1 capsule by mouth every morning (before breakfast)  Dispense: 90 capsule; Refill: 1    2. Essential hypertension  Repeat blood pressure still elevated in the office  Patient not checking blood pressure at home as he has had difficulty finding an appropriate sized cuff  Increase amlodipine to 10 mg daily. Continue lisinopril/HCTZ and carvedilol  Follow-up for nursing visit in 2 weeks for repeat blood pressure check    - amLODIPine (NORVASC) 10 MG tablet; Take 1 tablet by mouth daily  Dispense: 90 tablet; Refill: 2    3.  Dyslipidemia  Refill atorvastatin    - atorvastatin (LIPITOR) 80 MG tablet; Take 1 tablet by mouth at bedtime  Dispense: 90 tablet; Refill: 2      Return for nursing visit in 2 weeks for BP check; f/u in 3 months . Subjective   SUBJECTIVE/OBJECTIVE:  Patient is a 71-year-old -American male who presents to the office today for chronic right-sided abdominal pain. Patient states symptoms have been present for the better part of a year described as a constant dull pressure underneath the right ribs. States sometimes if he moves he will have shooting pains in the right upper and left upper quadrants but states that this has only happened 3 times over the past year. Has transient relief with having a bowel movement, burping or flatus. Sometimes if he eats he will notice the same pressure-like discomfort in his epigastric region but cannot identify particular dietary triggers. Typically has between 1 and 3 bowel movements per day with occasional straining or incomplete voiding sensation. No melena or hematochezia. Does not frequently use NSAIDs. Has tried over-the-counter Nexium and Tums but without significant relief. No anorexia, weight has remained stable. No nausea, vomiting, difficulty swallowing, dysuria, hematuria, difficulty urinating, fevers, chills, chest pain, shortness of breath or wheezing. Review of Systems   Constitutional:  Negative for appetite change, chills, fever and unexpected weight change. HENT:  Negative for trouble swallowing. Positive for itchy sensation in throat   Respiratory:  Positive for cough (Morning time with some sputum but no hemoptysis). Negative for shortness of breath and wheezing. Cardiovascular:  Negative for chest pain. Gastrointestinal:  Positive for abdominal pain and constipation. Negative for anal bleeding, blood in stool, nausea and vomiting. Genitourinary:  Negative for difficulty urinating, dysuria and hematuria. Skin:  Negative for rash. Objective   Physical Exam  Vitals reviewed. Constitutional:       General: He is not in acute distress. Appearance: Normal appearance. He is not ill-appearing, toxic-appearing or diaphoretic. HENT:      Head: Normocephalic and atraumatic. Eyes:      General:         Right eye: No discharge. Left eye: No discharge. Extraocular Movements: Extraocular movements intact. Cardiovascular:      Rate and Rhythm: Normal rate and regular rhythm. Heart sounds: No murmur heard. No friction rub. No gallop. Pulmonary:      Effort: Pulmonary effort is normal. No respiratory distress. Breath sounds: No wheezing, rhonchi or rales. Abdominal:      General: Bowel sounds are normal.      Palpations: Abdomen is soft. Tenderness: There is no abdominal tenderness. There is no guarding. Negative signs include Snyder's sign. Comments: [place] depicts area of patient's pain but no overlying skin changes or vesicular lesions   Skin:     General: Skin is dry. Coloration: Skin is not jaundiced. Neurological:      Mental Status: He is alert. Psychiatric:         Attention and Perception: Attention normal.         Mood and Affect: Mood and affect normal. Mood is not anxious or depressed. Affect is not tearful. Speech: Speech normal. Speech is not rapid and pressured or slurred. Behavior: Behavior normal. Behavior is not agitated, aggressive or combative. Behavior is cooperative. Thought Content: Thought content normal.                An electronic signature was used to authenticate this note.     --Harjit Long, DO

## 2023-02-28 ENCOUNTER — OFFICE VISIT (OUTPATIENT)
Dept: INTERNAL MEDICINE CLINIC | Facility: CLINIC | Age: 43
End: 2023-02-28
Payer: COMMERCIAL

## 2023-02-28 VITALS
BODY MASS INDEX: 41.61 KG/M2 | SYSTOLIC BLOOD PRESSURE: 128 MMHG | HEART RATE: 75 BPM | RESPIRATION RATE: 16 BRPM | OXYGEN SATURATION: 96 % | DIASTOLIC BLOOD PRESSURE: 96 MMHG | HEIGHT: 72 IN | TEMPERATURE: 97.9 F | WEIGHT: 307.2 LBS

## 2023-02-28 DIAGNOSIS — E78.5 DYSLIPIDEMIA: ICD-10-CM

## 2023-02-28 DIAGNOSIS — R10.11 RUQ ABDOMINAL PAIN: Primary | ICD-10-CM

## 2023-02-28 DIAGNOSIS — I10 ESSENTIAL HYPERTENSION: ICD-10-CM

## 2023-02-28 PROCEDURE — 3074F SYST BP LT 130 MM HG: CPT | Performed by: STUDENT IN AN ORGANIZED HEALTH CARE EDUCATION/TRAINING PROGRAM

## 2023-02-28 PROCEDURE — 3080F DIAST BP >= 90 MM HG: CPT | Performed by: STUDENT IN AN ORGANIZED HEALTH CARE EDUCATION/TRAINING PROGRAM

## 2023-02-28 PROCEDURE — 99214 OFFICE O/P EST MOD 30 MIN: CPT | Performed by: STUDENT IN AN ORGANIZED HEALTH CARE EDUCATION/TRAINING PROGRAM

## 2023-02-28 RX ORDER — OMEPRAZOLE 40 MG/1
40 CAPSULE, DELAYED RELEASE ORAL
Qty: 90 CAPSULE | Refills: 1 | Status: SHIPPED | OUTPATIENT
Start: 2023-02-28

## 2023-02-28 RX ORDER — AMLODIPINE BESYLATE 10 MG/1
10 TABLET ORAL DAILY
Qty: 90 TABLET | Refills: 2 | Status: SHIPPED | OUTPATIENT
Start: 2023-02-28

## 2023-02-28 RX ORDER — ZOLPIDEM TARTRATE 10 MG/1
TABLET ORAL NIGHTLY PRN
COMMUNITY

## 2023-02-28 RX ORDER — ATORVASTATIN CALCIUM 80 MG/1
80 TABLET, FILM COATED ORAL NIGHTLY
Qty: 90 TABLET | Refills: 2 | Status: SHIPPED | OUTPATIENT
Start: 2023-02-28

## 2023-02-28 SDOH — ECONOMIC STABILITY: INCOME INSECURITY: HOW HARD IS IT FOR YOU TO PAY FOR THE VERY BASICS LIKE FOOD, HOUSING, MEDICAL CARE, AND HEATING?: NOT HARD AT ALL

## 2023-02-28 SDOH — ECONOMIC STABILITY: FOOD INSECURITY: WITHIN THE PAST 12 MONTHS, YOU WORRIED THAT YOUR FOOD WOULD RUN OUT BEFORE YOU GOT MONEY TO BUY MORE.: NEVER TRUE

## 2023-02-28 SDOH — ECONOMIC STABILITY: FOOD INSECURITY: WITHIN THE PAST 12 MONTHS, THE FOOD YOU BOUGHT JUST DIDN'T LAST AND YOU DIDN'T HAVE MONEY TO GET MORE.: NEVER TRUE

## 2023-02-28 SDOH — ECONOMIC STABILITY: HOUSING INSECURITY
IN THE LAST 12 MONTHS, WAS THERE A TIME WHEN YOU DID NOT HAVE A STEADY PLACE TO SLEEP OR SLEPT IN A SHELTER (INCLUDING NOW)?: NO

## 2023-02-28 ASSESSMENT — ENCOUNTER SYMPTOMS
COUGH: 1
ANAL BLEEDING: 0
ABDOMINAL PAIN: 1
SHORTNESS OF BREATH: 0
CONSTIPATION: 1
VOMITING: 0
WHEEZING: 0
BLOOD IN STOOL: 0
NAUSEA: 0
TROUBLE SWALLOWING: 0

## 2023-02-28 ASSESSMENT — PATIENT HEALTH QUESTIONNAIRE - PHQ9
SUM OF ALL RESPONSES TO PHQ QUESTIONS 1-9: 1
2. FEELING DOWN, DEPRESSED OR HOPELESS: 1
SUM OF ALL RESPONSES TO PHQ9 QUESTIONS 1 & 2: 1
1. LITTLE INTEREST OR PLEASURE IN DOING THINGS: 0
SUM OF ALL RESPONSES TO PHQ QUESTIONS 1-9: 1

## 2023-03-13 ENCOUNTER — HOSPITAL ENCOUNTER (OUTPATIENT)
Dept: ULTRASOUND IMAGING | Age: 43
Discharge: HOME | End: 2023-03-16
Payer: COMMERCIAL

## 2023-03-13 ENCOUNTER — HOSPITAL ENCOUNTER (OUTPATIENT)
Dept: NUCLEAR MEDICINE | Age: 43
Discharge: HOME | End: 2023-03-16
Payer: COMMERCIAL

## 2023-03-13 DIAGNOSIS — R10.11 RUQ ABDOMINAL PAIN: ICD-10-CM

## 2023-03-13 PROCEDURE — A9537 TC99M MEBROFENIN: HCPCS | Performed by: STUDENT IN AN ORGANIZED HEALTH CARE EDUCATION/TRAINING PROGRAM

## 2023-03-13 PROCEDURE — 6360000004 HC RX CONTRAST MEDICATION: Performed by: STUDENT IN AN ORGANIZED HEALTH CARE EDUCATION/TRAINING PROGRAM

## 2023-03-13 PROCEDURE — 78227 HEPATOBIL SYST IMAGE W/DRUG: CPT

## 2023-03-13 PROCEDURE — 3430000000 HC RX DIAGNOSTIC RADIOPHARMACEUTICAL: Performed by: STUDENT IN AN ORGANIZED HEALTH CARE EDUCATION/TRAINING PROGRAM

## 2023-03-13 PROCEDURE — 76705 ECHO EXAM OF ABDOMEN: CPT

## 2023-03-13 RX ORDER — KIT FOR THE PREPARATION OF TECHNETIUM TC 99M MEBROFENIN 45 MG/10ML
6 INJECTION, POWDER, LYOPHILIZED, FOR SOLUTION INTRAVENOUS
Status: COMPLETED | OUTPATIENT
Start: 2023-03-13 | End: 2023-03-13

## 2023-03-13 RX ADMIN — MEBROFENIN 6 MILLICURIE: 45 INJECTION, POWDER, LYOPHILIZED, FOR SOLUTION INTRAVENOUS at 08:00

## 2023-03-13 RX ADMIN — SINCALIDE 2.79 MCG: 5 INJECTION, POWDER, LYOPHILIZED, FOR SOLUTION INTRAVENOUS at 09:00

## 2023-03-14 ENCOUNTER — NURSE ONLY (OUTPATIENT)
Dept: INTERNAL MEDICINE CLINIC | Facility: CLINIC | Age: 43
End: 2023-03-14

## 2023-03-14 VITALS — SYSTOLIC BLOOD PRESSURE: 120 MMHG | DIASTOLIC BLOOD PRESSURE: 90 MMHG

## 2023-03-14 NOTE — PROGRESS NOTES
Patient advised per Dr. Harrison Murphy to continue current medication regimen and check BP if not every day every other day. Patient voiced understanding.

## 2023-03-15 ENCOUNTER — TELEPHONE (OUTPATIENT)
Dept: INTERNAL MEDICINE CLINIC | Facility: CLINIC | Age: 43
End: 2023-03-15

## 2023-03-15 DIAGNOSIS — R10.11 RUQ ABDOMINAL PAIN: Primary | ICD-10-CM

## 2023-03-15 NOTE — TELEPHONE ENCOUNTER
Given patient's continued right upper quadrant abdominal pain and recent HIDA scan showing reduced gallbladder ejection fraction of 13% refer to general surgery per patient request to evaluate for elective cholecystectomy.     Orders Placed This Encounter    [address] [facility], [address]     Referral Priority:   Routine     Referral Type:   Eval and Treat     Referral Reason:   Specialty Services Required     Requested Specialty:   General Surgery     Number of Visits Requested:   1

## 2023-03-22 NOTE — PROGRESS NOTES
Katalina Cervantes MD   Bariatric & Advanced Laparoscopic Surgery & Endoscopy  [facility] [address], [address] [facility]  Michael Cochran  Phone [phone]   Fax [phone]      Date of visit: 3/23/2023      Primary/Requesting provider: Bello Orellana DO         Name: Viki Rg      MRN: [id number]       : 1980       Age: 40 y.o. Sex: male        PCP: Bello Orellana DO     CC:    Chief Complaint   Patient presents with    New Patient     RUQ pain        HPI:    Viki Rg is a 40 y.o. male who presents for evaluation of RUQ pain that started 1 year ago. Pain is 2/10. Pain does not radiates. Pain is better with BM and worse with nothing. Pain is associated with nausea. No vomiting. + GERD    Previous abdominal surgeries - none      Blood thinners - none  Immunosuppressants - none        PMH:    Past Medical History:   Diagnosis Date    Arthritis     shoulder    COVID-2020    GERD (gastroesophageal reflux disease)     otc med prn    HTN (hypertension)     managed with meds    Nausea & vomiting     Sleep apnea        PSH:    Past Surgical History:   Procedure Laterality Date    OTHER SURGICAL HISTORY      testicle twisted at age 7    SHOULDER ARTHROSCOPY      rt elbow    WISDOM TOOTH EXTRACTION         MEDS:    Current Outpatient Medications   Medication Sig Dispense Refill    zolpidem (AMBIEN) 10 MG tablet Take by mouth nightly as needed for Sleep.       atorvastatin (LIPITOR) 80 MG tablet Take 1 tablet by mouth at bedtime 90 tablet 2    amLODIPine (NORVASC) 10 MG tablet Take 1 tablet by mouth daily 90 tablet 2    omeprazole (PRILOSEC) 40 MG delayed release capsule Take 1 capsule by mouth every morning (before breakfast) 90 capsule 1    lisinopril-hydroCHLOROthiazide (PRINZIDE;ZESTORETIC) 20-25 MG per tablet Take 1 tablet by mouth daily 90 tablet 2    sertraline (ZOLOFT) 50 MG tablet Take 1 tablet by mouth daily 90 tablet 2    carvedilol (COREG) 12.5 MG tablet

## 2023-03-23 ENCOUNTER — OFFICE VISIT (OUTPATIENT)
Dept: SURGERY | Age: 43
End: 2023-03-23
Payer: COMMERCIAL

## 2023-03-23 ENCOUNTER — PREP FOR PROCEDURE (OUTPATIENT)
Dept: SURGERY | Age: 43
End: 2023-03-23

## 2023-03-23 VITALS
WEIGHT: 302 LBS | BODY MASS INDEX: 40.9 KG/M2 | HEIGHT: 72 IN | HEART RATE: 70 BPM | DIASTOLIC BLOOD PRESSURE: 95 MMHG | SYSTOLIC BLOOD PRESSURE: 146 MMHG

## 2023-03-23 DIAGNOSIS — R10.11 RUQ PAIN: Primary | ICD-10-CM

## 2023-03-23 DIAGNOSIS — K82.8 BILIARY DYSKINESIA: ICD-10-CM

## 2023-03-23 DIAGNOSIS — R94.8 ABNORMAL BILIARY HIDA SCAN: ICD-10-CM

## 2023-03-23 PROBLEM — K80.20 CHOLECYSTOLITHIASIS: Status: ACTIVE | Noted: 2023-03-23

## 2023-03-23 PROCEDURE — 99204 OFFICE O/P NEW MOD 45 MIN: CPT | Performed by: SURGERY

## 2023-03-23 PROCEDURE — 3080F DIAST BP >= 90 MM HG: CPT | Performed by: SURGERY

## 2023-03-23 PROCEDURE — 3077F SYST BP >= 140 MM HG: CPT | Performed by: SURGERY

## 2023-03-23 ASSESSMENT — ENCOUNTER SYMPTOMS
FACIAL SWELLING: 0
CONSTIPATION: 1
CHEST TIGHTNESS: 0
NAUSEA: 1
SHORTNESS OF BREATH: 0
VOMITING: 0
ABDOMINAL PAIN: 1
BACK PAIN: 0
EYE ITCHING: 0
ABDOMINAL DISTENTION: 0
COUGH: 0
DIARRHEA: 0
EYE PAIN: 0
BLOOD IN STOOL: 0

## 2023-04-17 RX ORDER — SODIUM CHLORIDE 0.9 % (FLUSH) 0.9 %
5-40 SYRINGE (ML) INJECTION EVERY 12 HOURS SCHEDULED
Status: CANCELLED | OUTPATIENT
Start: 2023-04-17

## 2023-04-17 RX ORDER — SODIUM CHLORIDE 0.9 % (FLUSH) 0.9 %
5-40 SYRINGE (ML) INJECTION PRN
Status: CANCELLED | OUTPATIENT
Start: 2023-04-17

## 2023-04-17 RX ORDER — SODIUM CHLORIDE 9 MG/ML
INJECTION, SOLUTION INTRAVENOUS PRN
Status: CANCELLED | OUTPATIENT
Start: 2023-04-17

## 2023-04-19 ENCOUNTER — OFFICE VISIT (OUTPATIENT)
Dept: ORTHOPEDIC SURGERY | Age: 43
End: 2023-04-19

## 2023-04-19 DIAGNOSIS — M77.12 LEFT LATERAL EPICONDYLITIS: ICD-10-CM

## 2023-04-19 DIAGNOSIS — M65.9 SYNOVITIS AND TENOSYNOVITIS: ICD-10-CM

## 2023-04-19 DIAGNOSIS — M19.90 INFLAMMATORY ARTHROPATHY: ICD-10-CM

## 2023-04-19 DIAGNOSIS — G56.22 ULNAR NEUROPATHY AT ELBOW, LEFT: ICD-10-CM

## 2023-04-19 DIAGNOSIS — M25.522 LEFT ELBOW PAIN: ICD-10-CM

## 2023-04-19 DIAGNOSIS — G56.22 CUBITAL TUNNEL SYNDROME ON LEFT: Primary | ICD-10-CM

## 2023-04-19 LAB
BASOPHILS # BLD: 0 K/UL (ref 0–0.2)
BASOPHILS NFR BLD: 1 % (ref 0–2)
DIFFERENTIAL METHOD BLD: ABNORMAL
EOSINOPHIL # BLD: 0.1 K/UL (ref 0–0.8)
EOSINOPHIL NFR BLD: 3 % (ref 0.5–7.8)
ERYTHROCYTE [DISTWIDTH] IN BLOOD BY AUTOMATED COUNT: 13.3 % (ref 11.9–14.6)
ERYTHROCYTE [SEDIMENTATION RATE] IN BLOOD: 2 MM/HR (ref 0–20)
HCT VFR BLD AUTO: 43.2 % (ref 41.1–50.3)
HGB BLD-MCNC: 14.6 G/DL (ref 13.6–17.2)
IMM GRANULOCYTES # BLD AUTO: 0.1 K/UL (ref 0–0.5)
IMM GRANULOCYTES NFR BLD AUTO: 1 % (ref 0–5)
LYMPHOCYTES # BLD: 1.8 K/UL (ref 0.5–4.6)
LYMPHOCYTES NFR BLD: 33 % (ref 13–44)
MCH RBC QN AUTO: 30.5 PG (ref 26.1–32.9)
MCHC RBC AUTO-ENTMCNC: 33.8 G/DL (ref 31.4–35)
MCV RBC AUTO: 90.4 FL (ref 82–102)
MONOCYTES # BLD: 0.4 K/UL (ref 0.1–1.3)
MONOCYTES NFR BLD: 7 % (ref 4–12)
NEUTS SEG # BLD: 3 K/UL (ref 1.7–8.2)
NEUTS SEG NFR BLD: 55 % (ref 43–78)
NRBC # BLD: 0 K/UL (ref 0–0.2)
PLATELET # BLD AUTO: 144 K/UL (ref 150–450)
PMV BLD AUTO: 11.9 FL (ref 9.4–12.3)
RBC # BLD AUTO: 4.78 M/UL (ref 4.23–5.6)
WBC # BLD AUTO: 5.5 K/UL (ref 4.3–11.1)

## 2023-04-19 RX ORDER — BETAMETHASONE SODIUM PHOSPHATE AND BETAMETHASONE ACETATE 3; 3 MG/ML; MG/ML
12 INJECTION, SUSPENSION INTRA-ARTICULAR; INTRALESIONAL; INTRAMUSCULAR; SOFT TISSUE ONCE
Status: COMPLETED | OUTPATIENT
Start: 2023-04-19 | End: 2023-04-19

## 2023-04-19 RX ADMIN — BETAMETHASONE SODIUM PHOSPHATE AND BETAMETHASONE ACETATE 12 MG: 3; 3 INJECTION, SUSPENSION INTRA-ARTICULAR; INTRALESIONAL; INTRAMUSCULAR; SOFT TISSUE at 09:36

## 2023-04-19 NOTE — PROGRESS NOTES
[facility] Note    Name: Katerina Schultz  Age: 40 y.o. YOB: 1980  Gender: male  MRN: [id number]      Follow up visit:   1. Synovitis and tenosynovitis    2. Left lateral epicondylitis    3. Ulnar neuropathy at elbow, left    4. Inflammatory arthropathy        HPI: Katerina Schultz is a 40 y.o. male who is following up for left elbow pain, numbness and paresthesias in ulnar distribution, on the left visit we provided an elbow brace, lateral epicondyle steroid injection, anti-inflammatories and home exercise program.  Patient reports the injection provided great relief but only for a few weeks and then started to recur, he is having more numbness and paresthesias in ulnar distribution. ROS/Meds/PSH/PMH/FH/SH: I personally reviewed the patients standard intake form. Pertinents are discussed in the HPI    Physical Examination:  General: Awake and alert. HEENT: Normocephalic, atraumatic  CV/Pulm: Breathing even and unlabored  Skin: No obvious rashes noted. Lymphatic: No obvious evidence of lymphedema or lymphadenopathy    Musculoskeletal Examination:  Examination on the left upper extremity demonstrates cap refill < 5 seconds in all fingers, decreased sensation in ulnar distribution compared to median distribution, he has a negative carpal tunnel compression test, negative Tinel at the carpal tunnel, he has a positive Tinel and positive elbow flexion compression test, the ulnar nerve subluxes over the medial condyle, there is minimal tenderness ovation of the medial condyle, severe tense palpation of the left lateral epicondyle, this is aggravated with resisted wrist extension and chairlift test.    Imaging / Electrodiagnostic Tests:     left Elbow  XR: AP, Lateral, Oblique views     Clinical Indication:  1. Synovitis and tenosynovitis    2. Left lateral epicondylitis    3. Ulnar neuropathy at elbow, left    4. Inflammatory arthropathy    5.  Left elbow pain           Report:

## 2023-04-20 LAB
ALBUMIN SERPL-MCNC: 4 G/DL (ref 3.5–5)
ALBUMIN/GLOB SERPL: 1.3 (ref 0.4–1.6)
ALP SERPL-CCNC: 79 U/L (ref 50–136)
ALT SERPL-CCNC: 26 U/L (ref 12–65)
ANA SER QL: NEGATIVE
ANION GAP SERPL CALC-SCNC: 4 MMOL/L (ref 2–11)
AST SERPL-CCNC: 18 U/L (ref 15–37)
BILIRUB SERPL-MCNC: 0.3 MG/DL (ref 0.2–1.1)
BUN SERPL-MCNC: 17 MG/DL (ref 6–23)
CALCIUM SERPL-MCNC: 9.4 MG/DL (ref 8.3–10.4)
CCP IGA+IGG SERPL IA-ACNC: 78 UNITS (ref 0–19)
CHLORIDE SERPL-SCNC: 106 MMOL/L (ref 101–110)
CO2 SERPL-SCNC: 29 MMOL/L (ref 21–32)
CREAT SERPL-MCNC: 1.4 MG/DL (ref 0.8–1.5)
CRP SERPL-MCNC: 0.9 MG/DL (ref 0–0.9)
GLOBULIN SER CALC-MCNC: 3.2 G/DL (ref 2.8–4.5)
GLUCOSE SERPL-MCNC: 108 MG/DL (ref 65–100)
POTASSIUM SERPL-SCNC: 3.8 MMOL/L (ref 3.5–5.1)
PROT SERPL-MCNC: 7.2 G/DL (ref 6.3–8.2)
RHEUMATOID FACT SER QL LA: NEGATIVE
SODIUM SERPL-SCNC: 139 MMOL/L (ref 133–143)
URATE SERPL-MCNC: 8.6 MG/DL (ref 2.6–6)

## 2023-04-21 ENCOUNTER — TELEPHONE (OUTPATIENT)
Dept: ORTHOPEDIC SURGERY | Age: 43
End: 2023-04-21

## 2023-04-21 NOTE — PROGRESS NOTES
Patient was fitted and instructed on an  Wrist Splint for patients left elbow. Patient is aware the hand slides in the brace with the thumb placed threw the thumb hole. I demonstrated the correct way to tighten the brace straps to allow for a comfortable fit. Patient understood the correct way to wear the brace. Patient read and signed documenting they understand and agree to [facility]'s current DME return policy.

## 2023-04-23 RX ORDER — SODIUM CHLORIDE 0.9 % (FLUSH) 0.9 %
5-40 SYRINGE (ML) INJECTION EVERY 12 HOURS SCHEDULED
Status: CANCELLED | OUTPATIENT
Start: 2023-04-23

## 2023-04-23 RX ORDER — SODIUM CHLORIDE 9 MG/ML
INJECTION, SOLUTION INTRAVENOUS PRN
Status: CANCELLED | OUTPATIENT
Start: 2023-04-23

## 2023-04-23 RX ORDER — SODIUM CHLORIDE 0.9 % (FLUSH) 0.9 %
5-40 SYRINGE (ML) INJECTION PRN
Status: CANCELLED | OUTPATIENT
Start: 2023-04-23

## 2023-04-24 ENCOUNTER — HOSPITAL ENCOUNTER (OUTPATIENT)
Age: 43
Setting detail: OUTPATIENT SURGERY
Discharge: HOME | End: 2023-04-24
Attending: SURGERY | Admitting: SURGERY
Payer: COMMERCIAL

## 2023-04-24 ENCOUNTER — TELEPHONE (OUTPATIENT)
Dept: ORTHOPEDIC SURGERY | Age: 43
End: 2023-04-24

## 2023-04-24 ENCOUNTER — ANESTHESIA EVENT (OUTPATIENT)
Dept: SURGERY | Age: 43
End: 2023-04-24
Payer: COMMERCIAL

## 2023-04-24 ENCOUNTER — ANESTHESIA (OUTPATIENT)
Dept: SURGERY | Age: 43
End: 2023-04-24
Payer: COMMERCIAL

## 2023-04-24 VITALS
BODY MASS INDEX: 40.71 KG/M2 | DIASTOLIC BLOOD PRESSURE: 91 MMHG | RESPIRATION RATE: 18 BRPM | TEMPERATURE: 98 F | HEART RATE: 71 BPM | OXYGEN SATURATION: 92 % | HEIGHT: 72 IN | WEIGHT: 300.6 LBS | SYSTOLIC BLOOD PRESSURE: 136 MMHG

## 2023-04-24 DIAGNOSIS — K80.20 CALCULUS OF GALLBLADDER WITHOUT CHOLECYSTITIS WITHOUT OBSTRUCTION: Primary | ICD-10-CM

## 2023-04-24 LAB — HLA-B27 QL NAA+PROBE: NEGATIVE

## 2023-04-24 PROCEDURE — 6370000000 HC RX 637 (ALT 250 FOR IP): Performed by: ANESTHESIOLOGY

## 2023-04-24 PROCEDURE — 7100000000 HC PACU RECOVERY - FIRST 15 MIN: Performed by: SURGERY

## 2023-04-24 PROCEDURE — 2709999900 HC NON-CHARGEABLE SUPPLY: Performed by: SURGERY

## 2023-04-24 PROCEDURE — 6360000002 HC RX W HCPCS: Performed by: ANESTHESIOLOGY

## 2023-04-24 PROCEDURE — 2500000003 HC RX 250 WO HCPCS: Performed by: SURGERY

## 2023-04-24 PROCEDURE — 3700000001 HC ADD 15 MINUTES (ANESTHESIA): Performed by: SURGERY

## 2023-04-24 PROCEDURE — 7100000001 HC PACU RECOVERY - ADDTL 15 MIN: Performed by: SURGERY

## 2023-04-24 PROCEDURE — 2500000003 HC RX 250 WO HCPCS: Performed by: NURSE ANESTHETIST, CERTIFIED REGISTERED

## 2023-04-24 PROCEDURE — S2900 ROBOTIC SURGICAL SYSTEM: HCPCS | Performed by: SURGERY

## 2023-04-24 PROCEDURE — 2500000003 HC RX 250 WO HCPCS: Performed by: ANESTHESIOLOGY

## 2023-04-24 PROCEDURE — 7100000010 HC PHASE II RECOVERY - FIRST 15 MIN: Performed by: SURGERY

## 2023-04-24 PROCEDURE — 88304 TISSUE EXAM BY PATHOLOGIST: CPT

## 2023-04-24 PROCEDURE — 2580000003 HC RX 258: Performed by: ANESTHESIOLOGY

## 2023-04-24 PROCEDURE — 2580000003 HC RX 258: Performed by: NURSE ANESTHETIST, CERTIFIED REGISTERED

## 2023-04-24 PROCEDURE — C1889 IMPLANT/INSERT DEVICE, NOC: HCPCS | Performed by: SURGERY

## 2023-04-24 PROCEDURE — 47562 LAPAROSCOPIC CHOLECYSTECTOMY: CPT | Performed by: SURGERY

## 2023-04-24 PROCEDURE — 7100000011 HC PHASE II RECOVERY - ADDTL 15 MIN: Performed by: SURGERY

## 2023-04-24 PROCEDURE — 3600000009 HC SURGERY ROBOT BASE: Performed by: SURGERY

## 2023-04-24 PROCEDURE — 6360000002 HC RX W HCPCS: Performed by: NURSE ANESTHETIST, CERTIFIED REGISTERED

## 2023-04-24 PROCEDURE — 3700000000 HC ANESTHESIA ATTENDED CARE: Performed by: SURGERY

## 2023-04-24 PROCEDURE — 3600000019 HC SURGERY ROBOT ADDTL 15MIN: Performed by: SURGERY

## 2023-04-24 PROCEDURE — 6360000002 HC RX W HCPCS: Performed by: SURGERY

## 2023-04-24 DEVICE — CLIP INT M L POLYMER LOK LIG HEM O LOK: Type: IMPLANTABLE DEVICE | Site: BILE DUCT | Status: FUNCTIONAL

## 2023-04-24 RX ORDER — SODIUM CHLORIDE, SODIUM LACTATE, POTASSIUM CHLORIDE, CALCIUM CHLORIDE 600; 310; 30; 20 MG/100ML; MG/100ML; MG/100ML; MG/100ML
INJECTION, SOLUTION INTRAVENOUS CONTINUOUS PRN
Status: DISCONTINUED | OUTPATIENT
Start: 2023-04-24 | End: 2023-04-24 | Stop reason: SDUPTHER

## 2023-04-24 RX ORDER — PROCHLORPERAZINE EDISYLATE 5 MG/ML
5 INJECTION INTRAMUSCULAR; INTRAVENOUS
Status: COMPLETED | OUTPATIENT
Start: 2023-04-24 | End: 2023-04-24

## 2023-04-24 RX ORDER — OXYCODONE HYDROCHLORIDE AND ACETAMINOPHEN 5; 325 MG/1; MG/1
1 TABLET ORAL EVERY 6 HOURS PRN
Qty: 28 TABLET | Refills: 0 | Status: SHIPPED | OUTPATIENT
Start: 2023-04-24 | End: 2023-05-01

## 2023-04-24 RX ORDER — LIDOCAINE HYDROCHLORIDE 10 MG/ML
1 INJECTION, SOLUTION INFILTRATION; PERINEURAL
Status: COMPLETED | OUTPATIENT
Start: 2023-04-24 | End: 2023-04-24

## 2023-04-24 RX ORDER — INDOCYANINE GREEN AND WATER 25 MG
5 KIT INJECTION ONCE
Status: COMPLETED | OUTPATIENT
Start: 2023-04-24 | End: 2023-04-24

## 2023-04-24 RX ORDER — ACETAMINOPHEN 500 MG
1000 TABLET ORAL EVERY 6 HOURS PRN
COMMUNITY

## 2023-04-24 RX ORDER — FENTANYL CITRATE 50 UG/ML
INJECTION, SOLUTION INTRAMUSCULAR; INTRAVENOUS PRN
Status: DISCONTINUED | OUTPATIENT
Start: 2023-04-24 | End: 2023-04-24 | Stop reason: SDUPTHER

## 2023-04-24 RX ORDER — ROCURONIUM BROMIDE 10 MG/ML
INJECTION, SOLUTION INTRAVENOUS PRN
Status: DISCONTINUED | OUTPATIENT
Start: 2023-04-24 | End: 2023-04-24 | Stop reason: SDUPTHER

## 2023-04-24 RX ORDER — OXYCODONE HYDROCHLORIDE 5 MG/1
5 TABLET ORAL
Status: COMPLETED | OUTPATIENT
Start: 2023-04-24 | End: 2023-04-24

## 2023-04-24 RX ORDER — SUCCINYLCHOLINE CHLORIDE 20 MG/ML
INJECTION INTRAMUSCULAR; INTRAVENOUS PRN
Status: DISCONTINUED | OUTPATIENT
Start: 2023-04-24 | End: 2023-04-24 | Stop reason: SDUPTHER

## 2023-04-24 RX ORDER — EPHEDRINE SULFATE/0.9% NACL/PF 50 MG/5 ML
SYRINGE (ML) INTRAVENOUS PRN
Status: DISCONTINUED | OUTPATIENT
Start: 2023-04-24 | End: 2023-04-24 | Stop reason: SDUPTHER

## 2023-04-24 RX ORDER — MIDAZOLAM HYDROCHLORIDE 2 MG/2ML
2 INJECTION, SOLUTION INTRAMUSCULAR; INTRAVENOUS
Status: COMPLETED | OUTPATIENT
Start: 2023-04-24 | End: 2023-04-24

## 2023-04-24 RX ORDER — BUPIVACAINE HYDROCHLORIDE 5 MG/ML
INJECTION, SOLUTION EPIDURAL; INTRACAUDAL PRN
Status: DISCONTINUED | OUTPATIENT
Start: 2023-04-24 | End: 2023-04-24 | Stop reason: HOSPADM

## 2023-04-24 RX ORDER — GLYCOPYRROLATE 0.2 MG/ML
INJECTION INTRAMUSCULAR; INTRAVENOUS PRN
Status: DISCONTINUED | OUTPATIENT
Start: 2023-04-24 | End: 2023-04-24 | Stop reason: SDUPTHER

## 2023-04-24 RX ORDER — DEXAMETHASONE SODIUM PHOSPHATE 10 MG/ML
INJECTION INTRAMUSCULAR; INTRAVENOUS PRN
Status: DISCONTINUED | OUTPATIENT
Start: 2023-04-24 | End: 2023-04-24 | Stop reason: SDUPTHER

## 2023-04-24 RX ORDER — LIDOCAINE HYDROCHLORIDE 20 MG/ML
INJECTION, SOLUTION EPIDURAL; INFILTRATION; INTRACAUDAL; PERINEURAL PRN
Status: DISCONTINUED | OUTPATIENT
Start: 2023-04-24 | End: 2023-04-24 | Stop reason: SDUPTHER

## 2023-04-24 RX ORDER — PROPOFOL 10 MG/ML
INJECTION, EMULSION INTRAVENOUS PRN
Status: DISCONTINUED | OUTPATIENT
Start: 2023-04-24 | End: 2023-04-24 | Stop reason: SDUPTHER

## 2023-04-24 RX ORDER — NEOSTIGMINE METHYLSULFATE 1 MG/ML
INJECTION, SOLUTION INTRAVENOUS PRN
Status: DISCONTINUED | OUTPATIENT
Start: 2023-04-24 | End: 2023-04-24 | Stop reason: SDUPTHER

## 2023-04-24 RX ORDER — SODIUM CHLORIDE, SODIUM LACTATE, POTASSIUM CHLORIDE, CALCIUM CHLORIDE 600; 310; 30; 20 MG/100ML; MG/100ML; MG/100ML; MG/100ML
INJECTION, SOLUTION INTRAVENOUS CONTINUOUS
Status: DISCONTINUED | OUTPATIENT
Start: 2023-04-24 | End: 2023-04-24 | Stop reason: HOSPADM

## 2023-04-24 RX ORDER — ACETAMINOPHEN 500 MG
1000 TABLET ORAL ONCE
Status: COMPLETED | OUTPATIENT
Start: 2023-04-24 | End: 2023-04-24

## 2023-04-24 RX ADMIN — FENTANYL CITRATE 100 MCG: 50 INJECTION, SOLUTION INTRAMUSCULAR; INTRAVENOUS at 11:23

## 2023-04-24 RX ADMIN — SUGAMMADEX 200 MG: 100 INJECTION, SOLUTION INTRAVENOUS at 12:16

## 2023-04-24 RX ADMIN — INDOCYANINE GREEN AND WATER 5 MG: KIT at 10:52

## 2023-04-24 RX ADMIN — HYDROMORPHONE HYDROCHLORIDE 0.5 MG: 1 INJECTION, SOLUTION INTRAMUSCULAR; INTRAVENOUS; SUBCUTANEOUS at 12:42

## 2023-04-24 RX ADMIN — PHENYLEPHRINE HYDROCHLORIDE 100 MCG: 10 INJECTION INTRAVENOUS at 11:52

## 2023-04-24 RX ADMIN — LIDOCAINE HYDROCHLORIDE 1 ML: 10 INJECTION, SOLUTION INFILTRATION; PERINEURAL at 10:46

## 2023-04-24 RX ADMIN — SODIUM CHLORIDE, POTASSIUM CHLORIDE, SODIUM LACTATE AND CALCIUM CHLORIDE: 600; 310; 30; 20 INJECTION, SOLUTION INTRAVENOUS at 10:46

## 2023-04-24 RX ADMIN — ACETAMINOPHEN 1000 MG: 500 TABLET, FILM COATED ORAL at 10:51

## 2023-04-24 RX ADMIN — MIDAZOLAM HYDROCHLORIDE 2 MG: 1 INJECTION, SOLUTION INTRAMUSCULAR; INTRAVENOUS at 11:00

## 2023-04-24 RX ADMIN — PHENYLEPHRINE HYDROCHLORIDE 100 MCG: 10 INJECTION INTRAVENOUS at 11:50

## 2023-04-24 RX ADMIN — Medication 5 MG: at 11:52

## 2023-04-24 RX ADMIN — ROCURONIUM BROMIDE 10 MG: 50 INJECTION, SOLUTION INTRAVENOUS at 11:50

## 2023-04-24 RX ADMIN — OXYCODONE 5 MG: 5 TABLET ORAL at 13:08

## 2023-04-24 RX ADMIN — ROCURONIUM BROMIDE 25 MG: 50 INJECTION, SOLUTION INTRAVENOUS at 11:29

## 2023-04-24 RX ADMIN — PROCHLORPERAZINE EDISYLATE 5 MG: 5 INJECTION INTRAMUSCULAR; INTRAVENOUS at 12:30

## 2023-04-24 RX ADMIN — ROCURONIUM BROMIDE 5 MG: 50 INJECTION, SOLUTION INTRAVENOUS at 11:22

## 2023-04-24 RX ADMIN — HYDROMORPHONE HYDROCHLORIDE 0.5 MG: 1 INJECTION, SOLUTION INTRAMUSCULAR; INTRAVENOUS; SUBCUTANEOUS at 12:27

## 2023-04-24 RX ADMIN — Medication 200 MG: at 11:22

## 2023-04-24 RX ADMIN — Medication 3000 MG: at 11:30

## 2023-04-24 RX ADMIN — PHENYLEPHRINE HYDROCHLORIDE 100 MCG: 10 INJECTION INTRAVENOUS at 11:42

## 2023-04-24 RX ADMIN — PHENYLEPHRINE HYDROCHLORIDE 100 MCG: 10 INJECTION INTRAVENOUS at 11:44

## 2023-04-24 RX ADMIN — HYDROMORPHONE HYDROCHLORIDE 0.5 MG: 1 INJECTION, SOLUTION INTRAMUSCULAR; INTRAVENOUS; SUBCUTANEOUS at 12:32

## 2023-04-24 RX ADMIN — Medication 1 MG: at 12:12

## 2023-04-24 RX ADMIN — Medication 3 MG: at 12:04

## 2023-04-24 RX ADMIN — HYDROMORPHONE HYDROCHLORIDE 0.5 MG: 1 INJECTION, SOLUTION INTRAMUSCULAR; INTRAVENOUS; SUBCUTANEOUS at 12:47

## 2023-04-24 RX ADMIN — SODIUM CHLORIDE, SODIUM LACTATE, POTASSIUM CHLORIDE, AND CALCIUM CHLORIDE: 600; 310; 30; 20 INJECTION, SOLUTION INTRAVENOUS at 11:12

## 2023-04-24 RX ADMIN — LIDOCAINE HYDROCHLORIDE 100 MG: 20 INJECTION, SOLUTION EPIDURAL; INFILTRATION; INTRACAUDAL; PERINEURAL at 11:22

## 2023-04-24 RX ADMIN — ROCURONIUM BROMIDE 10 MG: 50 INJECTION, SOLUTION INTRAVENOUS at 11:39

## 2023-04-24 RX ADMIN — PROPOFOL 300 MG: 10 INJECTION, EMULSION INTRAVENOUS at 11:22

## 2023-04-24 RX ADMIN — PHENYLEPHRINE HYDROCHLORIDE 100 MCG: 10 INJECTION INTRAVENOUS at 11:43

## 2023-04-24 RX ADMIN — GLYCOPYRROLATE 0.4 MG: 0.2 INJECTION INTRAMUSCULAR; INTRAVENOUS at 12:04

## 2023-04-24 RX ADMIN — DEXAMETHASONE SODIUM PHOSPHATE 8 MG: 10 INJECTION INTRAMUSCULAR; INTRAVENOUS at 11:32

## 2023-04-24 ASSESSMENT — ENCOUNTER SYMPTOMS
EYE ITCHING: 0
COUGH: 0
BLOOD IN STOOL: 0
DIARRHEA: 0
ABDOMINAL DISTENTION: 0
CHEST TIGHTNESS: 0
BACK PAIN: 0
SHORTNESS OF BREATH: 0
NAUSEA: 1
ABDOMINAL PAIN: 1
CONSTIPATION: 1
VOMITING: 0
EYE PAIN: 0
FACIAL SWELLING: 0

## 2023-04-24 ASSESSMENT — PAIN DESCRIPTION - LOCATION
LOCATION: ABDOMEN

## 2023-04-24 ASSESSMENT — PAIN SCALES - GENERAL
PAINLEVEL_OUTOF10: 8
PAINLEVEL_OUTOF10: 2
PAINLEVEL_OUTOF10: 4
PAINLEVEL_OUTOF10: 8

## 2023-04-24 ASSESSMENT — PAIN DESCRIPTION - ORIENTATION
ORIENTATION: RIGHT;LEFT
ORIENTATION: RIGHT;LEFT

## 2023-04-24 ASSESSMENT — PAIN DESCRIPTION - DESCRIPTORS
DESCRIPTORS: SORE
DESCRIPTORS: SORE
DESCRIPTORS: ACHING;SORE

## 2023-04-24 ASSESSMENT — PAIN - FUNCTIONAL ASSESSMENT: PAIN_FUNCTIONAL_ASSESSMENT: 0-10

## 2023-04-24 NOTE — H&P
Kuldeep Troncoso MD   Bariatric & Advanced Laparoscopic Surgery & Endoscopy  [address] [facility] [address] [facility]  Lachelle Cochranamalia Larisa  Phone [phone]   Fax [phone]      Date of visit: 3/23/2023      Primary/Requesting provider: Elen Herzog DO         Name: Sweta Yoder      MRN: [id number]       : 1980       Age: 40 y.o. Sex: male        PCP: Elen Herzog DO     CC:    Chief Complaint   Patient presents with    New Patient     RUQ pain        HPI:    Sweta Yoder is a 40 y.o. male who presents for evaluation of RUQ pain that started 1 year ago. Pain is 2/10. Pain does not radiates. Pain is better with BM and worse with nothing. Pain is associated with nausea. No vomiting. + GERD    Previous abdominal surgeries - none      Blood thinners - none  Immunosuppressants - none        PMH:    Past Medical History:   Diagnosis Date    Arthritis     shoulder    COVID-2020    GERD (gastroesophageal reflux disease)     otc med prn    HTN (hypertension)     managed with meds    Nausea & vomiting     Sleep apnea        PSH:    Past Surgical History:   Procedure Laterality Date    OTHER SURGICAL HISTORY      testicle twisted at age 7    SHOULDER ARTHROSCOPY      rt elbow    WISDOM TOOTH EXTRACTION         MEDS:    Current Outpatient Medications   Medication Sig Dispense Refill    zolpidem (AMBIEN) 10 MG tablet Take by mouth nightly as needed for Sleep.       atorvastatin (LIPITOR) 80 MG tablet Take 1 tablet by mouth at bedtime 90 tablet 2    amLODIPine (NORVASC) 10 MG tablet Take 1 tablet by mouth daily 90 tablet 2    omeprazole (PRILOSEC) 40 MG delayed release capsule Take 1 capsule by mouth every morning (before breakfast) 90 capsule 1    lisinopril-hydroCHLOROthiazide (PRINZIDE;ZESTORETIC) 20-25 MG per tablet Take 1 tablet by mouth daily 90 tablet 2    sertraline (ZOLOFT) 50 MG tablet Take 1 tablet by mouth daily 90 tablet 2    carvedilol (COREG) 12.5 MG tablet

## 2023-04-24 NOTE — DISCHARGE INSTRUCTIONS
diet, regular physical exercise & weight monitoring are important for maintaining a healthy lifestyle    You may be retaining fluid if you have a history of heart failure or if you experience any of the following symptoms:  Weight gain of 3 pounds or more overnight or 5 pounds in a week, increased swelling in our hands or feet or shortness of breath while lying flat in bed. Please call your doctor as soon as you notice any of these symptoms; do not wait until your next office visit.

## 2023-04-24 NOTE — ANESTHESIA PRE PROCEDURE
Department of Anesthesiology  Preprocedure Note       Name:  Ofelia Shea   Age:  40 y.o.  :  1980                                          MRN:  [id number]         Date:  2023      Surgeon: Cem Arora):  Fay Souza MD    Procedure: Procedure(s):  CHOLECYSTECTOMY LAPAROSCOPIC ROBOTIC    Medications prior to admission:   Prior to Admission medications    Medication Sig Start Date End Date Taking? Authorizing Provider   acetaminophen (TYLENOL) 500 MG tablet Take 2 tablets by mouth every 6 hours as needed for Pain   Yes Historical Provider, MD   oxyCODONE-acetaminophen (PERCOCET) 5-325 MG per tablet Take 1 tablet by mouth every 6 hours as needed for Pain for up to 7 days. Intended supply: 7 days. Take lowest dose possible to manage pain Max Daily Amount: 4 tablets 23 Yes Fay Souza MD   zolpidem (AMBIEN) 10 MG tablet Take by mouth nightly as needed for Sleep. Patient not taking: Reported on 2023    Historical Provider, MD   atorvastatin (LIPITOR) 80 MG tablet Take 1 tablet by mouth at bedtime 23   Susan Ryan DO   amLODIPine (NORVASC) 10 MG tablet Take 1 tablet by mouth daily 23   Susan Ryan DO   omeprazole (PRILOSEC) 40 MG delayed release capsule Take 1 capsule by mouth every morning (before breakfast)  Patient not taking: Reported on 2023   Susan Ryan DO   lisinopril-hydroCHLOROthiazide (PRINZIDE;ZESTORETIC) 20-25 MG per tablet Take 1 tablet by mouth daily 23   Susan Ryan DO   sertraline (ZOLOFT) 50 MG tablet Take 1 tablet by mouth daily 23   Susan Ryan DO   carvedilol (COREG) 12.5 MG tablet Take 1 tablet by mouth 2 times daily (with meals) 23   Susan Ryan DO   meloxicam SATURNINO [facility] [place]) 15 MG tablet Take 1 tablet by mouth daily 22  Monica Reyna MD   meloxicam (MOBIC) 15 MG tablet Take 1/2-1 whole tablet once a day as needed for elbow pain. Maximum of 1 whole tablet per 24 hours.

## 2023-04-24 NOTE — ANESTHESIA POSTPROCEDURE EVALUATION
Department of Anesthesiology  Postprocedure Note    Patient: Jenny Beck  MRN: [id number]  YOB: 1980  Date of evaluation: 4/24/2023      Procedure Summary     Date: 04/24/23 [facility]    Anesthesia Start: 1112 Anesthesia Stop: 1226    Procedure: CHOLECYSTECTOMY LAPAROSCOPIC ROBOTIC (Abdomen) Diagnosis:       Cholecystolithiasis      (Cholecystolithiasis [K80.20])    Surgeons: Damaris Hawthorne MD Responsible Provider: Jude Hills MD    Anesthesia Type: general ASA Status: 3          Anesthesia Type: No value filed.     Buck Phase I: Buck Score: 9    Buck Phase II:        Anesthesia Post Evaluation    Patient location during evaluation: PACU  Level of consciousness: awake and alert  Airway patency: patent  Nausea & Vomiting: no nausea  Complications: no  Cardiovascular status: hemodynamically stable  Respiratory status: acceptable  Hydration status: euvolemic

## 2023-04-24 NOTE — OP NOTE
Trendelenberg with his right side up. The robot was docked. The robotic instruments were carefully inserted under direct visualization. The KVK TEAMe grasper was used to retract the fundus of the gallbladder cephalad. The infundibulum of the gallbladder was exposed. The infundibulum was retracted inferiorly and laterally. The triangle of Calot was exposed using the hook cautery to clear the triangle of Calot of peritoneum and fat both anteriorly and posteriorly. This dissection was extended laterally and cephalad on the anterior and posterior walls of the gallbladder. The cystic duct was identified and circumferentially dissected. The cystic duct was identified at its connection with the gallbladder infundibulum. We used Firefly to confirm the location of the cystic duct related to the common bile duct and we were far away from the insertion of the cystic duct into the common bile duct. The anterior cystic artery was identified and dissected circumferentially. A critical view was obtained of the triangle of Calot both anteriorly and posteriorly. Pictures of the critical view were taken for the chart. Robotic clips were applied to the cystic artery and cystic duct. Two clips were applied proximally and one clip was applied distally. The cystic artery and cystic duct were divided sharply. The gallbladder was removed from the gallbladder fossa using a hook cautery. Hemostasis was verified along the liver bed and the clips were visualized with no evidence of bile leaking or bleeding. The gallbladder was placed in an endocatch bag and removed through the umbilical incision. The liver bed and clips were again visualized and in place, there was good hemostasis. The umbilical fascia was closed with 0-Vicryl sutures in interrupted fashion. The skin of all cannula insertion sites was closed with 4-0 Monocryl subcuticular sutures. Mastisol and Steri strips were applied to the skin incisions.   All counts were reported as

## 2023-04-25 DIAGNOSIS — M19.90 INFLAMMATORY ARTHROPATHY: ICD-10-CM

## 2023-04-25 DIAGNOSIS — M65.9 SYNOVITIS AND TENOSYNOVITIS: Primary | ICD-10-CM

## 2023-04-25 ASSESSMENT — ENCOUNTER SYMPTOMS
GASTROINTESTINAL NEGATIVE: 1
VOMITING: 0
CONSTIPATION: 0
RESPIRATORY NEGATIVE: 1
DIARRHEA: 0
NAUSEA: 0
ABDOMINAL PAIN: 0

## 2023-04-25 NOTE — TELEPHONE ENCOUNTER
Called and informed pt to reach out  to insurance company to see what rheumatologist in within network and call back and we can send orders per pt request to different facility.

## 2023-05-09 ENCOUNTER — OFFICE VISIT (OUTPATIENT)
Dept: SURGERY | Age: 43
End: 2023-05-09

## 2023-05-09 DIAGNOSIS — Z09 POSTOPERATIVE EXAMINATION: Primary | ICD-10-CM

## 2023-05-09 PROCEDURE — 99024 POSTOP FOLLOW-UP VISIT: CPT | Performed by: NURSE PRACTITIONER

## 2023-05-09 NOTE — PATIENT INSTRUCTIONS
-Follow up PRN  -Keep incisional sites clean and dry  -No tub bath until incisional sites are completely healed  -No heavy lifting > 20 pounds x 4 weeks post op  -Resume diet as tolerated

## 2023-05-17 DIAGNOSIS — M77.12 LEFT LATERAL EPICONDYLITIS: Primary | ICD-10-CM

## 2023-05-17 RX ORDER — MELOXICAM 15 MG/1
15 TABLET ORAL DAILY
Qty: 30 TABLET | Refills: 0 | Status: SHIPPED | OUTPATIENT
Start: 2023-05-17 | End: 2023-06-16

## 2023-05-29 NOTE — PROGRESS NOTES
Abdias Beatty (:  1980) is a 40 y.o. male,Established patient, here for evaluation of the following chief complaint(s):  No chief complaint on file. ASSESSMENT/PLAN:  1. Essential hypertension  Continue lisinopril/HCTZ, carvedilol, amlodipine (dose increased to 10 mg daily at last visit)  Minimize salt intake, monitor home blood pressure regularly    2. Dyslipidemia  Lipid panel from 3/17/2022 reviewed  Continue atorvastatin and lifestyle modifications    3. Anxiety and depression  Continue sertraline    4. Chronic insomnia  Continue as needed Ambien as patient still experiencing benefit from medication without adverse effects  Monitor carefully given obstructive sleep apnea    5. Obstructive sleep apnea  Borderline per patient, not currently on CPAP  Monitor if increasing severity of snoring or apneic episodes consider repeat polysomnogram    6. Hepatic steatosis  Retroperitoneal ultrasound on 10/30/2018 with fatty infiltration of the liver  Continue atorvastatin and lifestyle modifications    7. Prediabetes  A1c 6% in 2022, recheck with upcoming labs  Continue lifestyle modifications    8. Inflammatory arthropathy  Labs from 2023 notable for elevated anti-CCP antibody, elevated uric acid at 8.6; negative ANALI, HLA-B27, rheumatoid factor; normal sed rate, CRP  Has since been referred to rheumatology by orthopedic specialist  Continue as needed meloxicam        No follow-ups on file. Subjective   SUBJECTIVE/OBJECTIVE:  HPI    Review of Systems       Objective   Physical Exam       {Time Documentation Optional:[id number]}      An electronic signature was used to authenticate this note.     --Jared Serna DO

## 2023-05-30 ENCOUNTER — TELEMEDICINE (OUTPATIENT)
Dept: INTERNAL MEDICINE CLINIC | Facility: CLINIC | Age: 43
End: 2023-05-30
Payer: COMMERCIAL

## 2023-05-30 DIAGNOSIS — F32.A ANXIETY AND DEPRESSION: ICD-10-CM

## 2023-05-30 DIAGNOSIS — M62.89 DECREASED MUSCLE TONE: ICD-10-CM

## 2023-05-30 DIAGNOSIS — R68.82 DECREASED LIBIDO: ICD-10-CM

## 2023-05-30 DIAGNOSIS — G47.33 OBSTRUCTIVE SLEEP APNEA: ICD-10-CM

## 2023-05-30 DIAGNOSIS — I10 ESSENTIAL HYPERTENSION: Primary | ICD-10-CM

## 2023-05-30 DIAGNOSIS — M19.90 INFLAMMATORY ARTHROPATHY: ICD-10-CM

## 2023-05-30 DIAGNOSIS — F51.04 CHRONIC INSOMNIA: ICD-10-CM

## 2023-05-30 DIAGNOSIS — G56.93 BILATERAL NEUROPATHY OF UPPER EXTREMITIES: ICD-10-CM

## 2023-05-30 DIAGNOSIS — R73.03 PREDIABETES: ICD-10-CM

## 2023-05-30 DIAGNOSIS — F41.9 ANXIETY AND DEPRESSION: ICD-10-CM

## 2023-05-30 DIAGNOSIS — K76.0 HEPATIC STEATOSIS: ICD-10-CM

## 2023-05-30 DIAGNOSIS — R53.82 CHRONIC FATIGUE: ICD-10-CM

## 2023-05-30 DIAGNOSIS — E78.5 DYSLIPIDEMIA: ICD-10-CM

## 2023-05-30 PROCEDURE — 99214 OFFICE O/P EST MOD 30 MIN: CPT | Performed by: STUDENT IN AN ORGANIZED HEALTH CARE EDUCATION/TRAINING PROGRAM

## 2023-05-30 RX ORDER — ZOLPIDEM TARTRATE 10 MG/1
10 TABLET ORAL NIGHTLY PRN
Qty: 30 TABLET | Refills: 2 | Status: CANCELLED | OUTPATIENT
Start: 2023-05-30 | End: 2023-08-28

## 2023-05-30 RX ORDER — AMLODIPINE BESYLATE 10 MG/1
10 TABLET ORAL DAILY
Qty: 90 TABLET | Refills: 2 | Status: SHIPPED | OUTPATIENT
Start: 2023-05-30

## 2023-05-30 RX ORDER — ZOLPIDEM TARTRATE 10 MG/1
TABLET ORAL
Qty: 30 TABLET | Refills: 0 | Status: SHIPPED | OUTPATIENT
Start: 2023-05-30 | End: 2023-11-30

## 2023-05-30 ASSESSMENT — ENCOUNTER SYMPTOMS
NAUSEA: 0
DIARRHEA: 0
CONSTIPATION: 0
ABDOMINAL PAIN: 1
SHORTNESS OF BREATH: 0
ANAL BLEEDING: 0
VOMITING: 0
BACK PAIN: 1
BLOOD IN STOOL: 0

## 2023-05-30 NOTE — PROGRESS NOTES
Steph Wheeler (:  1980) is a Established patient, presenting virtually for evaluation of the following: Hypertension, joint pain, peripheral neuropathy, medication refill    Assessment & Plan   Below is the assessment and plan developed based on review of pertinent history, physical exam, labs, studies, and medications. 1. Essential hypertension  Continue lisinopril/HCTZ, carvedilol, amlodipine (dose increased to 10 mg daily at last visit)  Minimize salt intake    - TSH; Future  - T4, Free; Future  - CBC with Auto Differential; Future  - Comprehensive Metabolic Panel; Future  - amLODIPine (NORVASC) 10 MG tablet; Take 1 tablet by mouth daily  Dispense: 90 tablet; Refill: 2    2. Dyslipidemia  Lipid panel from 3/17/2022 reviewed  Recheck lipid panel, estimate ASCVD risk  Continue atorvastatin and lifestyle modifications    - TSH; Future  - T4, Free; Future  - Lipid Panel; Future    3. Anxiety and depression  Continue sertraline at current dosage given symptoms well controlled    4. Chronic insomnia  Continue as needed Ambien as patient still experiencing benefit from medication without adverse effects  Monitor carefully given obstructive sleep apnea  PDMP reviewed showing zolpidem tartrate 10 mg tablets, quantity #30, 30-day supply last filled on 11/15/2022. New prescription sent to pharmacy on record    - zolpidem (AMBIEN) 10 MG tablet; Take 1/2-1 whole tablet by mouth at nighttime as needed for sleep. Maximum of 1 tablet per 24 hours. Do not take alongside alcohol, drive or operate heavy machinery after use  Dispense: 30 tablet; Refill: 0    5. Obstructive sleep apnea  Borderline per patient, not currently on CPAP  Monitor and if increasing severity of snoring or apneic episodes consider repeat polysomnogram    6. Hepatic steatosis  Retroperitoneal ultrasound on 10/30/2018 with fatty infiltration of the liver  Continue atorvastatin and lifestyle modifications  Recheck CMP to evaluate LFTs    7.

## 2023-06-09 NOTE — PROGRESS NOTES
Adolfo Sousa  : 1980 [facility]  [address], [address]  [phone]   [phone]         OUTPATIENT PHYSICAL THERAPY:Physician Note 2017    ICD-10: Treatment Diagnosis: Pain in right shoulder (M25.511)  Incomplete rotator cuff teaImpingement syndrome of right shoulder (M75.41)r or rupture of right shoulder, not specified as traumatic (M75.111)     MEDICAL/REFERRING DIAGNOSIS:  Impingement syndrome of right shoulder [M75.41]  Incomplete rotator cuff tear or rupture of right shoulder, not specified as traumatic [M75.111]     REFERRING PHYSICIAN: Vania Oh MD         ASSESSMENT:  Mr. Carmen Rogers is now about 10 weeks s/p R rotator cuff repair and has been seen in therapy for PROM/stretching gradually progressing to AROM and more advanced strengthening/stabilisation/light plyometric exercises in accordance with rehab protocol. He is doing very well with full ROM and good strength. He has very slight residual abnormal scapulohumeral rhythm which should improve with continued strengthening of scapula and rotator cuff strength. He is expected to be compliant with this as would like to return to some sort of modified weight lifting regimen when allowed. He should be able to be discharged on HEP at this stage.                  ROM:       LUE Assessment  LUE Assessment (WDL): Exceptions to WDL  LUE AROM  L Shoulder Flexion: 160  L Shoulder Extension: 35  L Shoulder Internal Rotation: 50  L Shoulder External Rotation: 95              RUE Assessment  RUE Assessment (WDL): Exception to WDL  RUE PROM  R Shoulder Flexion: 160   R shoulder abduction 170  R Shoulder Internal Rotation: 60  R Shoulder External Rotation: 70  R elbow ext WDL        Strength:    LUE Strength, Tone, Sensation  LUE Strength, Tone, Sensation (WDL): Within defined limits        RUE Strength, Tone, Sensation  RUE Strength, Tone, Sensation (WDL): Exception to WDL  RUE Strength: 4+/5               Thank you for this referral,    Gibran Cummins, PT Elliptical Excision Additional Text (Leave Blank If You Do Not Want): The margin was drawn around the clinically apparent lesion.  An elliptical shape was then drawn on the skin incorporating the lesion and margins.  Incisions were then made along these lines to the appropriate tissue plane and the lesion was extirpated.

## 2023-06-21 ENCOUNTER — OFFICE VISIT (OUTPATIENT)
Dept: ORTHOPEDIC SURGERY | Age: 43
End: 2023-06-21
Payer: COMMERCIAL

## 2023-06-21 VITALS — HEIGHT: 72 IN | BODY MASS INDEX: 40.63 KG/M2 | WEIGHT: 300 LBS

## 2023-06-21 DIAGNOSIS — M54.50 LOW BACK PAIN, UNSPECIFIED BACK PAIN LATERALITY, UNSPECIFIED CHRONICITY, UNSPECIFIED WHETHER SCIATICA PRESENT: Primary | ICD-10-CM

## 2023-06-21 PROCEDURE — 99204 OFFICE O/P NEW MOD 45 MIN: CPT | Performed by: ORTHOPAEDIC SURGERY

## 2023-06-21 RX ORDER — PREDNISONE 10 MG/1
TABLET ORAL
Qty: 48 TABLET | Refills: 0 | Status: SHIPPED | OUTPATIENT
Start: 2023-06-21

## 2023-06-21 NOTE — PROGRESS NOTES
[facility]  Consultation Note    Patient ID:  Name: Steph Wheeler  MRN: [id number]  AGE: 40 y.o.  : 1980    Date of Consultation:  2023    CC:   Chief Complaint   Patient presents with    New Patient     Low back pain         HPI: This is a new patient visit on Steph Wheeler, he is a 42-year-old black male who is complaining of low back and right leg pain for about a year without any injury but is really gotten intense over the last 4 months right leg pain seems to go down the posterior leg into the dorsal foot or maybe the plantar foot with numbness and tingling in the left leg he only gets left hip area pain is at this point the pain is constant and its worse standing walking it seems to be better just moving around a little bit treatment has been ice Tylenol diclofenac and stretching     Past Medical History Includes: He is not a diabetic he does not have heart or lung disease and he does not take any blood thinners      Family History:   Family History   Problem Relation Age of Onset    No Known Problems Brother     Breast Cancer Mother     Cancer Mother     No Known Problems Sister     Hypertension Mother     Hypertension Father     Heart Disease Father         Enlarged Heart      Social History:   Social History     Tobacco Use    Smoking status: Some Days     Packs/day: 0.25     Types: Cigarettes, Cigars     Last attempt to quit: 2018     Years since quittin.4    Smokeless tobacco: Never    Tobacco comments:     3 cigars/week   Substance Use Topics    Alcohol use:  Yes     Alcohol/week: 5.0 standard drinks     Types: 3 Cans of beer, 2 Shots of liquor per week       ALLERGIES:   Allergies   Allergen Reactions    Penicillins Hives    Sulfa Antibiotics Hives        Patient Medications    Current Outpatient Medications   Medication Sig    amLODIPine (NORVASC) 10 MG tablet Take 1 tablet by mouth daily    diclofenac (VOLTAREN) 50 MG EC tablet Take 1

## 2023-06-25 ENCOUNTER — HOSPITAL ENCOUNTER (EMERGENCY)
Age: 43
Discharge: HOME | End: 2023-06-25
Attending: EMERGENCY MEDICINE
Payer: COMMERCIAL

## 2023-06-25 VITALS
DIASTOLIC BLOOD PRESSURE: 89 MMHG | OXYGEN SATURATION: 96 % | SYSTOLIC BLOOD PRESSURE: 154 MMHG | TEMPERATURE: 98.1 F | RESPIRATION RATE: 16 BRPM | WEIGHT: 300 LBS | BODY MASS INDEX: 40.63 KG/M2 | HEIGHT: 72 IN | HEART RATE: 83 BPM

## 2023-06-25 DIAGNOSIS — G89.29 ACUTE EXACERBATION OF CHRONIC LOW BACK PAIN: Primary | ICD-10-CM

## 2023-06-25 DIAGNOSIS — M54.50 ACUTE EXACERBATION OF CHRONIC LOW BACK PAIN: Primary | ICD-10-CM

## 2023-06-25 PROCEDURE — 99284 EMERGENCY DEPT VISIT MOD MDM: CPT

## 2023-06-25 PROCEDURE — 96372 THER/PROPH/DIAG INJ SC/IM: CPT

## 2023-06-25 PROCEDURE — 6360000002 HC RX W HCPCS: Performed by: EMERGENCY MEDICINE

## 2023-06-25 RX ORDER — TIZANIDINE 2 MG/1
2 TABLET ORAL 3 TIMES DAILY PRN
Qty: 21 TABLET | Refills: 0 | Status: SHIPPED | OUTPATIENT
Start: 2023-06-25

## 2023-06-25 RX ORDER — HYDROCODONE BITARTRATE AND ACETAMINOPHEN 10; 325 MG/1; MG/1
0.5 TABLET ORAL EVERY 6 HOURS PRN
Qty: 10 TABLET | Refills: 0 | Status: SHIPPED | OUTPATIENT
Start: 2023-06-25 | End: 2023-07-25

## 2023-06-25 RX ORDER — KETOROLAC TROMETHAMINE 30 MG/ML
30 INJECTION, SOLUTION INTRAMUSCULAR; INTRAVENOUS
Status: COMPLETED | OUTPATIENT
Start: 2023-06-25 | End: 2023-06-25

## 2023-06-25 RX ADMIN — KETOROLAC TROMETHAMINE 30 MG: 30 INJECTION, SOLUTION INTRAMUSCULAR; INTRAVENOUS at 10:36

## 2023-06-25 ASSESSMENT — PAIN SCALES - GENERAL
PAINLEVEL_OUTOF10: 8
PAINLEVEL_OUTOF10: 9

## 2023-06-25 ASSESSMENT — PAIN DESCRIPTION - LOCATION: LOCATION: BACK

## 2023-06-25 ASSESSMENT — PAIN - FUNCTIONAL ASSESSMENT: PAIN_FUNCTIONAL_ASSESSMENT: 0-10

## 2023-06-27 ENCOUNTER — OFFICE VISIT (OUTPATIENT)
Dept: ORTHOPEDIC SURGERY | Age: 43
End: 2023-06-27
Payer: COMMERCIAL

## 2023-06-27 DIAGNOSIS — M19.90 INFLAMMATORY ARTHROPATHY: ICD-10-CM

## 2023-06-27 DIAGNOSIS — M77.12 LEFT LATERAL EPICONDYLITIS: ICD-10-CM

## 2023-06-27 DIAGNOSIS — G56.22 ULNAR NEUROPATHY AT ELBOW, LEFT: Primary | ICD-10-CM

## 2023-06-27 PROCEDURE — 99214 OFFICE O/P EST MOD 30 MIN: CPT | Performed by: ORTHOPAEDIC SURGERY

## 2023-06-27 PROCEDURE — 20551 NJX 1 TENDON ORIGIN/INSJ: CPT | Performed by: ORTHOPAEDIC SURGERY

## 2023-06-27 RX ORDER — BETAMETHASONE SODIUM PHOSPHATE AND BETAMETHASONE ACETATE 3; 3 MG/ML; MG/ML
12 INJECTION, SUSPENSION INTRA-ARTICULAR; INTRALESIONAL; INTRAMUSCULAR; SOFT TISSUE ONCE
Status: COMPLETED | OUTPATIENT
Start: 2023-06-27 | End: 2023-06-27

## 2023-06-27 RX ADMIN — BETAMETHASONE SODIUM PHOSPHATE AND BETAMETHASONE ACETATE 12 MG: 3; 3 INJECTION, SUSPENSION INTRA-ARTICULAR; INTRALESIONAL; INTRAMUSCULAR; SOFT TISSUE at 09:24

## 2023-07-12 ENCOUNTER — PATIENT MESSAGE (OUTPATIENT)
Dept: ORTHOPEDIC SURGERY | Age: 43
End: 2023-07-12

## 2023-07-12 DIAGNOSIS — M54.50 LOW BACK PAIN, UNSPECIFIED BACK PAIN LATERALITY, UNSPECIFIED CHRONICITY, UNSPECIFIED WHETHER SCIATICA PRESENT: Primary | ICD-10-CM

## 2023-07-12 DIAGNOSIS — F41.9 ANXIETY AND DEPRESSION: Primary | ICD-10-CM

## 2023-07-12 DIAGNOSIS — F51.04 CHRONIC INSOMNIA: ICD-10-CM

## 2023-07-12 DIAGNOSIS — I10 ESSENTIAL HYPERTENSION: ICD-10-CM

## 2023-07-12 DIAGNOSIS — M19.90 INFLAMMATORY ARTHROPATHY: ICD-10-CM

## 2023-07-12 DIAGNOSIS — F32.A ANXIETY AND DEPRESSION: Primary | ICD-10-CM

## 2023-07-12 RX ORDER — LISINOPRIL AND HYDROCHLOROTHIAZIDE 25; 20 MG/1; MG/1
1 TABLET ORAL DAILY
Qty: 90 TABLET | Refills: 2 | Status: SHIPPED | OUTPATIENT
Start: 2023-07-12

## 2023-07-12 RX ORDER — ZOLPIDEM TARTRATE 10 MG/1
TABLET ORAL
Qty: 30 TABLET | Refills: 1 | Status: SHIPPED | OUTPATIENT
Start: 2023-07-12 | End: 2024-01-12

## 2023-07-12 RX ORDER — AMLODIPINE BESYLATE 10 MG/1
10 TABLET ORAL DAILY
Qty: 90 TABLET | Refills: 2 | Status: CANCELLED | OUTPATIENT
Start: 2023-07-12

## 2023-07-12 NOTE — TELEPHONE ENCOUNTER
Patient requesting refills on several medications including Ambien. PDMP reviewed showing zolpidem tartrate 10 mg tablets, quantity #30, 30-day supply last filled on 5/30/2023. New prescription sent to pharmacy on record    Orders Placed This Encounter    lisinopril-hydroCHLOROthiazide (PRINZIDE;ZESTORETIC) 20-25 MG per tablet     Sig: Take 1 tablet by mouth daily     Dispense:  90 tablet     Refill:  2    sertraline (ZOLOFT) 50 MG tablet     Sig: Take 1 tablet by mouth daily     Dispense:  90 tablet     Refill:  2    diclofenac (VOLTAREN) 50 MG EC tablet     Sig: Take 1 tablet by mouth 2 times daily as needed for Pain Take in place of meloxicam.  Take with food. Avoid other pain medicine except for Tylenol while on this medication. Dispense:  60 tablet     Refill:  2    zolpidem (AMBIEN) 10 MG tablet     Sig: Take 1/2-1 whole tablet by mouth at nighttime as needed for sleep. Maximum of 1 tablet per 24 hours.   Do not take alongside alcohol, drive or operate heavy machinery after use     Dispense:  30 tablet     Refill:  1

## 2023-07-14 NOTE — TELEPHONE ENCOUNTER
From: Elba Bridges  To: Dr. Deric Michael  Sent: 7/12/2023 11:27 AM EDT  Subject: Schedule MRI 07/19    Are we still holding for the scheduled MRI on 07/19/23 at 7:45 am? I received a notice from my insurance company denying coverage for this because they have ruled that it is not a medical necessity. On the other hand, the prednisone and anti-inflammatory shot at the ER along with some rest and muscle relaxers and pain relievers has really improved this. I have been able to walk and function without pain but still some pins and needles and occasional light pain but no where near what I was experiencing.  I not sure if this is temporary relief though

## 2023-07-24 DIAGNOSIS — M19.90 INFLAMMATORY ARTHROPATHY: Primary | ICD-10-CM

## 2023-08-27 NOTE — PROGRESS NOTES
Parveen Case (:  1980) is a 40 y.o. male,Established patient, here for evaluation of the following chief complaint(s):  Hypertension (Follow Up) and Shoulder Pain         ASSESSMENT/PLAN:  1. Essential hypertension  Continue lisinopril/HCTZ, amlodipine, carvedilol at current doses given blood pressure controlled in the office today  Monitor home blood pressure regularly    - CBC with Auto Differential; Future  - Comprehensive Metabolic Panel; Future  - Lipid Panel; Future  - T4, Free; Future  - TSH; Future    2. Chronic insomnia  3. Obstructive sleep apnea  Per patient obstructive sleep apnea borderline, not currently on CPAP  Currently on as needed Ambien. Monitor carefully for worsening snoring or apneic episodes and if so consider repeat polysomnogram  Controlled substance agreement signed in office today  PDMP reviewed showing zolpidem tartrate 10 mg tablets, quantity #30, 30-day supply last filled on 2023. Per chart review and confirmed through [address] patient should have 1 refill remaining on this medication. 4. Prediabetes  A1c 6% in 2022, recheck with upcoming labs  Continue lifestyle modifications    - Hemoglobin A1C; Future    5. Inflammatory arthropathy  Labs from 2023 notable for elevated anti-CCP antibody, elevated uric acid at 8.6; negative ANALI, HLA-B27, rheumatoid factor; normal sed rate, CRP  No significant improvement with diclofenac, switch back to meloxicam which was more efficacious  Scheduled for rheumatology evaluation in December    - meloxicam (MOBIC) 15 MG tablet; Take 1/2-1 whole tablet by mouth once a day as needed for pain. Max of 1 tablet per 24 hours. Take with food. Avoid other pain medication except for Tylenol while using. Dispense: 90 tablet; Refill: 2    6.  Cubital tunnel syndrome on left  EMG/NCS on 2023 with left cubital tunnel syndrome with motor demyelination and axonal loss over the elbow region  MRI left elbow on 2023 as

## 2023-08-30 ENCOUNTER — OFFICE VISIT (OUTPATIENT)
Dept: INTERNAL MEDICINE CLINIC | Facility: CLINIC | Age: 43
End: 2023-08-30
Payer: COMMERCIAL

## 2023-08-30 VITALS
RESPIRATION RATE: 16 BRPM | BODY MASS INDEX: 41.28 KG/M2 | HEIGHT: 72 IN | SYSTOLIC BLOOD PRESSURE: 118 MMHG | OXYGEN SATURATION: 96 % | TEMPERATURE: 97.1 F | DIASTOLIC BLOOD PRESSURE: 82 MMHG | WEIGHT: 304.8 LBS | HEART RATE: 73 BPM

## 2023-08-30 DIAGNOSIS — M25.511 ACUTE PAIN OF RIGHT SHOULDER: ICD-10-CM

## 2023-08-30 DIAGNOSIS — I10 ESSENTIAL HYPERTENSION: Primary | ICD-10-CM

## 2023-08-30 DIAGNOSIS — M19.90 INFLAMMATORY ARTHROPATHY: ICD-10-CM

## 2023-08-30 DIAGNOSIS — R73.03 PREDIABETES: ICD-10-CM

## 2023-08-30 DIAGNOSIS — G56.22 CUBITAL TUNNEL SYNDROME ON LEFT: ICD-10-CM

## 2023-08-30 DIAGNOSIS — G47.33 OBSTRUCTIVE SLEEP APNEA: ICD-10-CM

## 2023-08-30 DIAGNOSIS — F51.04 CHRONIC INSOMNIA: ICD-10-CM

## 2023-08-30 PROBLEM — G47.10 HYPERSOMNIA: Status: RESOLVED | Noted: 2017-06-22 | Resolved: 2023-08-30

## 2023-08-30 PROCEDURE — 3074F SYST BP LT 130 MM HG: CPT | Performed by: STUDENT IN AN ORGANIZED HEALTH CARE EDUCATION/TRAINING PROGRAM

## 2023-08-30 PROCEDURE — 3079F DIAST BP 80-89 MM HG: CPT | Performed by: STUDENT IN AN ORGANIZED HEALTH CARE EDUCATION/TRAINING PROGRAM

## 2023-08-30 PROCEDURE — 99214 OFFICE O/P EST MOD 30 MIN: CPT | Performed by: STUDENT IN AN ORGANIZED HEALTH CARE EDUCATION/TRAINING PROGRAM

## 2023-08-30 RX ORDER — MELOXICAM 15 MG/1
TABLET ORAL
Qty: 90 TABLET | Refills: 2 | Status: SHIPPED | OUTPATIENT
Start: 2023-08-30

## 2023-08-30 ASSESSMENT — PATIENT HEALTH QUESTIONNAIRE - PHQ9
2. FEELING DOWN, DEPRESSED OR HOPELESS: NOT AT ALL
6. FEELING BAD ABOUT YOURSELF - OR THAT YOU ARE A FAILURE OR HAVE LET YOURSELF OR YOUR FAMILY DOWN: NOT AT ALL
SUM OF ALL RESPONSES TO PHQ QUESTIONS 1-9: 6
10. IF YOU CHECKED OFF ANY PROBLEMS, HOW DIFFICULT HAVE THESE PROBLEMS MADE IT FOR YOU TO DO YOUR WORK, TAKE CARE OF THINGS AT HOME, OR GET ALONG WITH OTHER PEOPLE: SOMEWHAT DIFFICULT
SUM OF ALL RESPONSES TO PHQ QUESTIONS 1-9: 6
SUM OF ALL RESPONSES TO PHQ QUESTIONS 1-9: 6
9. THOUGHTS THAT YOU WOULD BE BETTER OFF DEAD, OR OF HURTING YOURSELF: 0
1. LITTLE INTEREST OR PLEASURE IN DOING THINGS: 0
5. POOR APPETITE OR OVEREATING: 1
4. FEELING TIRED OR HAVING LITTLE ENERGY: SEVERAL DAYS
7. TROUBLE CONCENTRATING ON THINGS, SUCH AS READING THE NEWSPAPER OR WATCHING TELEVISION: SEVERAL DAYS
6. FEELING BAD ABOUT YOURSELF - OR THAT YOU ARE A FAILURE OR HAVE LET YOURSELF OR YOUR FAMILY DOWN: 0
1. LITTLE INTEREST OR PLEASURE IN DOING THINGS: NOT AT ALL
8. MOVING OR SPEAKING SO SLOWLY THAT OTHER PEOPLE COULD HAVE NOTICED. OR THE OPPOSITE, BEING SO FIGETY OR RESTLESS THAT YOU HAVE BEEN MOVING AROUND A LOT MORE THAN USUAL: 0
9. THOUGHTS THAT YOU WOULD BE BETTER OFF DEAD, OR OF HURTING YOURSELF: NOT AT ALL
5. POOR APPETITE OR OVEREATING: SEVERAL DAYS
8. MOVING OR SPEAKING SO SLOWLY THAT OTHER PEOPLE COULD HAVE NOTICED. OR THE OPPOSITE - BEING SO FIDGETY OR RESTLESS THAT YOU HAVE BEEN MOVING AROUND A LOT MORE THAN USUAL: NOT AT ALL
2. FEELING DOWN, DEPRESSED OR HOPELESS: 0
3. TROUBLE FALLING OR STAYING ASLEEP: NEARLY EVERY DAY
10. IF YOU CHECKED OFF ANY PROBLEMS, HOW DIFFICULT HAVE THESE PROBLEMS MADE IT FOR YOU TO DO YOUR WORK, TAKE CARE OF THINGS AT HOME, OR GET ALONG WITH OTHER PEOPLE: 1
4. FEELING TIRED OR HAVING LITTLE ENERGY: 1
7. TROUBLE CONCENTRATING ON THINGS, SUCH AS READING THE NEWSPAPER OR WATCHING TELEVISION: 1
SUM OF ALL RESPONSES TO PHQ QUESTIONS 1-9: 6
SUM OF ALL RESPONSES TO PHQ QUESTIONS 1-9: 6
3. TROUBLE FALLING OR STAYING ASLEEP: 3
SUM OF ALL RESPONSES TO PHQ9 QUESTIONS 1 & 2: 0

## 2023-08-30 ASSESSMENT — ENCOUNTER SYMPTOMS
ANAL BLEEDING: 0
BLOOD IN STOOL: 0
NAUSEA: 0
VOMITING: 0
ABDOMINAL PAIN: 0

## 2023-09-05 ENCOUNTER — TELEMEDICINE (OUTPATIENT)
Dept: RHEUMATOLOGY | Age: 43
End: 2023-09-05
Payer: COMMERCIAL

## 2023-09-05 DIAGNOSIS — Z79.52 LONG TERM (CURRENT) USE OF SYSTEMIC STEROIDS: ICD-10-CM

## 2023-09-05 DIAGNOSIS — G62.9 NEUROPATHY: ICD-10-CM

## 2023-09-05 DIAGNOSIS — R53.83 OTHER FATIGUE: ICD-10-CM

## 2023-09-05 DIAGNOSIS — E55.9 HYPOVITAMINOSIS D: ICD-10-CM

## 2023-09-05 DIAGNOSIS — R89.4 SEROLOGIC ABNORMALITY: ICD-10-CM

## 2023-09-05 DIAGNOSIS — Z79.899 LONG TERM CURRENT USE OF IMMUNOSUPPRESSIVE DRUG: ICD-10-CM

## 2023-09-05 DIAGNOSIS — M15.9 GENERALIZED OSTEOARTHRITIS: ICD-10-CM

## 2023-09-05 DIAGNOSIS — M06.4 INFLAMMATORY POLYARTHRITIS (HCC): Primary | ICD-10-CM

## 2023-09-05 DIAGNOSIS — Z79.899 HIGH RISK MEDICATION USE: ICD-10-CM

## 2023-09-05 PROCEDURE — 99245 OFF/OP CONSLTJ NEW/EST HI 55: CPT | Performed by: INTERNAL MEDICINE

## 2023-09-05 RX ORDER — GABAPENTIN 300 MG/1
CAPSULE ORAL
Qty: 90 CAPSULE | Refills: 0 | Status: SHIPPED | OUTPATIENT
Start: 2023-09-05 | End: 2024-09-12

## 2023-09-05 RX ORDER — PREDNISONE 5 MG/1
TABLET ORAL
Qty: 150 TABLET | Refills: 0 | Status: SHIPPED | OUTPATIENT
Start: 2023-09-05

## 2023-09-05 NOTE — PROGRESS NOTES
Patient is seen as a new consult at the request of Dr. Chyna Garrett is a 40 y.o. male who was seen by synchronous (real-time) audio-video technology. Consent:  He and/or his healthcare decision maker is aware that this patient-initiated Telehealth encounter is a billable service, with coverage as determined by his insurance carrier. He is aware that he may receive a bill and has provided verbal consent to proceed: Yes    I was at home while conducting this encounter. Subjective:      HPI:      Permanent history  Mr. Susan Devlin is a very pleasant 64-year-old -American gentleman with past medical history significant for hypertension, hyperlipidemia, arthritis who presents for evaluation. Patient was referred by his orthopedist for advanced and extensive arthritis/synovitis/tenosynovitis which was worked up and showed elevated anti-CCP. He reports severe pain in his left elbow and losing . He has a history of lower back, right elbow, and shoulder issues. The patient started noticing symptoms in his early 35s, approximately 10 years ago, including stiffness in his neck, tight shoulder muscles, and aching in his shoulders and elbows. He also reports shoulder impingement and the sensation of tendons jumping over something or moving around. Mr. Susan Devlin experiences heat in his back, shoulders, and elbows at times, but denies joint swelling. He describes his joints as feeling tight and reports that his symptoms are worse in the morning, improving as he moves throughout the day. However, he stiffens up again towards the end of the day or after sitting for extended periods. The patient has a family history of arthritis in his mother, but no known family history of lupus, rheumatoid arthritis, or thyroid disease. He works as an  and has a history of more physically demanding jobs.  He smoked cigarettes until his early 35s and currently consumes a couple of

## 2023-09-05 NOTE — PATIENT INSTRUCTIONS
Labs - cbc, cmp, ESR, crp, CCP, hep panel, vitd , lupus panel, uric acid   Start prednisone 15 mg daily for 3 days then down to 10 mg daily for 3 days then 5 mg daily until follow-up [take with food first thing in the morning)  Start gabapentin 300 mg once daily at 7 PM  Reading material on rheumatoid arthritis, methotrexate  Stop meloxicam  X-rays of the hands bilaterally patient with history of longstanding inflammatory polyarthritis now with elevated CCP  Return to clinic in 2 to 4 weeks call if any issues.

## 2023-09-18 ENCOUNTER — TELEPHONE (OUTPATIENT)
Dept: RHEUMATOLOGY | Age: 43
End: 2023-09-18

## 2023-09-18 DIAGNOSIS — M25.559 HIP PAIN, UNSPECIFIED LATERALITY: ICD-10-CM

## 2023-09-18 DIAGNOSIS — M06.4 INFLAMMATORY POLYARTHRITIS (HCC): Primary | ICD-10-CM

## 2023-09-18 DIAGNOSIS — M15.9 GENERALIZED OSTEOARTHRITIS: ICD-10-CM

## 2023-09-18 DIAGNOSIS — G62.9 NEUROPATHY: ICD-10-CM

## 2023-09-18 DIAGNOSIS — Z79.52 LONG TERM (CURRENT) USE OF SYSTEMIC STEROIDS: ICD-10-CM

## 2023-09-19 DIAGNOSIS — Z79.899 HIGH RISK MEDICATION USE: ICD-10-CM

## 2023-09-19 DIAGNOSIS — R89.4 SEROLOGIC ABNORMALITY: ICD-10-CM

## 2023-09-19 DIAGNOSIS — Z79.899 LONG TERM CURRENT USE OF IMMUNOSUPPRESSIVE DRUG: ICD-10-CM

## 2023-09-19 DIAGNOSIS — Z79.52 LONG TERM (CURRENT) USE OF SYSTEMIC STEROIDS: ICD-10-CM

## 2023-09-19 DIAGNOSIS — M06.4 INFLAMMATORY POLYARTHRITIS (HCC): ICD-10-CM

## 2023-09-19 DIAGNOSIS — M15.9 GENERALIZED OSTEOARTHRITIS: ICD-10-CM

## 2023-09-19 DIAGNOSIS — E55.9 HYPOVITAMINOSIS D: ICD-10-CM

## 2023-09-19 DIAGNOSIS — G62.9 NEUROPATHY: ICD-10-CM

## 2023-09-19 DIAGNOSIS — R53.83 OTHER FATIGUE: ICD-10-CM

## 2023-09-19 LAB
ALBUMIN SERPL-MCNC: 4.1 G/DL (ref 3.5–5)
ALBUMIN/GLOB SERPL: 1.5 (ref 0.4–1.6)
ALP SERPL-CCNC: 68 U/L (ref 50–136)
ALT SERPL-CCNC: 47 U/L (ref 12–65)
ANION GAP SERPL CALC-SCNC: 4 MMOL/L (ref 2–11)
AST SERPL-CCNC: 28 U/L (ref 15–37)
BILIRUB SERPL-MCNC: 0.4 MG/DL (ref 0.2–1.1)
BUN SERPL-MCNC: 16 MG/DL (ref 6–23)
CALCIUM SERPL-MCNC: 9.5 MG/DL (ref 8.3–10.4)
CHLORIDE SERPL-SCNC: 107 MMOL/L (ref 101–110)
CO2 SERPL-SCNC: 31 MMOL/L (ref 21–32)
CREAT SERPL-MCNC: 1.5 MG/DL (ref 0.8–1.5)
CRP SERPL-MCNC: 0.3 MG/DL (ref 0–0.9)
ERYTHROCYTE [DISTWIDTH] IN BLOOD BY AUTOMATED COUNT: 13.9 % (ref 11.9–14.6)
ERYTHROCYTE [SEDIMENTATION RATE] IN BLOOD: 5 MM/HR (ref 0–20)
GLOBULIN SER CALC-MCNC: 2.8 G/DL (ref 2.8–4.5)
GLUCOSE SERPL-MCNC: 96 MG/DL (ref 65–100)
HCT VFR BLD AUTO: 43.8 % (ref 41.1–50.3)
HGB BLD-MCNC: 14.5 G/DL (ref 13.6–17.2)
MCH RBC QN AUTO: 30.2 PG (ref 26.1–32.9)
MCHC RBC AUTO-ENTMCNC: 33.1 G/DL (ref 31.4–35)
MCV RBC AUTO: 91.3 FL (ref 82–102)
NRBC # BLD: 0 K/UL (ref 0–0.2)
PLATELET # BLD AUTO: 125 K/UL (ref 150–450)
PMV BLD AUTO: 12.2 FL (ref 9.4–12.3)
POTASSIUM SERPL-SCNC: 4 MMOL/L (ref 3.5–5.1)
PROT SERPL-MCNC: 6.9 G/DL (ref 6.3–8.2)
RBC # BLD AUTO: 4.8 M/UL (ref 4.23–5.6)
SODIUM SERPL-SCNC: 142 MMOL/L (ref 133–143)
URATE SERPL-MCNC: 8.4 MG/DL (ref 2.6–6)
WBC # BLD AUTO: 6.3 K/UL (ref 4.3–11.1)

## 2023-09-20 LAB
25(OH)D3 SERPL-MCNC: 24.4 NG/ML (ref 30–100)
HAV IGM SER QL: NONREACTIVE
HBV CORE IGM SER QL: NONREACTIVE
HBV SURFACE AG SER QL: NONREACTIVE
HCV AB SER QL: NONREACTIVE
RHEUMATOID FACT SER QL LA: NEGATIVE

## 2023-09-21 LAB
CCP IGA+IGG SERPL IA-ACNC: 110 UNITS (ref 0–19)
CENTROMERE B AB SER-ACNC: <0.2 AI (ref 0–0.9)
CHROMATIN AB SERPL-ACNC: <0.2 AI (ref 0–0.9)
DSDNA AB SER-ACNC: <1 IU/ML (ref 0–9)
ENA JO1 AB SER-ACNC: <0.2 AI (ref 0–0.9)
ENA RNP AB SER-ACNC: <0.2 AI (ref 0–0.9)
ENA SCL70 AB SER-ACNC: <0.2 AI (ref 0–0.9)
ENA SM AB SER-ACNC: <0.2 AI (ref 0–0.9)
ENA SS-A AB SER-ACNC: <0.2 AI (ref 0–0.9)
ENA SS-B AB SER-ACNC: <0.2 AI (ref 0–0.9)
Lab: NORMAL

## 2023-09-22 ENCOUNTER — HOSPITAL ENCOUNTER (OUTPATIENT)
Dept: GENERAL RADIOLOGY | Age: 43
Discharge: HOME | End: 2023-09-22
Payer: COMMERCIAL

## 2023-09-22 DIAGNOSIS — M06.4 INFLAMMATORY POLYARTHRITIS (HCC): ICD-10-CM

## 2023-09-22 DIAGNOSIS — G62.9 NEUROPATHY: ICD-10-CM

## 2023-09-22 DIAGNOSIS — M25.559 HIP PAIN, UNSPECIFIED LATERALITY: ICD-10-CM

## 2023-09-22 DIAGNOSIS — R53.83 OTHER FATIGUE: ICD-10-CM

## 2023-09-22 DIAGNOSIS — Z79.52 LONG TERM (CURRENT) USE OF SYSTEMIC STEROIDS: ICD-10-CM

## 2023-09-22 DIAGNOSIS — R89.4 SEROLOGIC ABNORMALITY: ICD-10-CM

## 2023-09-22 DIAGNOSIS — Z79.899 HIGH RISK MEDICATION USE: ICD-10-CM

## 2023-09-22 DIAGNOSIS — M15.9 GENERALIZED OSTEOARTHRITIS: ICD-10-CM

## 2023-09-22 DIAGNOSIS — Z79.899 LONG TERM CURRENT USE OF IMMUNOSUPPRESSIVE DRUG: ICD-10-CM

## 2023-09-22 DIAGNOSIS — E55.9 HYPOVITAMINOSIS D: ICD-10-CM

## 2023-09-22 PROCEDURE — 72170 X-RAY EXAM OF PELVIS: CPT

## 2023-09-22 PROCEDURE — 73130 X-RAY EXAM OF HAND: CPT

## 2023-09-26 ENCOUNTER — OFFICE VISIT (OUTPATIENT)
Dept: RHEUMATOLOGY | Age: 43
End: 2023-09-26
Payer: COMMERCIAL

## 2023-09-26 VITALS
HEIGHT: 72 IN | WEIGHT: 302 LBS | HEART RATE: 73 BPM | BODY MASS INDEX: 40.9 KG/M2 | SYSTOLIC BLOOD PRESSURE: 147 MMHG | DIASTOLIC BLOOD PRESSURE: 93 MMHG

## 2023-09-26 DIAGNOSIS — Z71.85 VACCINE COUNSELING: ICD-10-CM

## 2023-09-26 DIAGNOSIS — M06.4 INFLAMMATORY POLYARTHRITIS (HCC): Primary | ICD-10-CM

## 2023-09-26 DIAGNOSIS — E55.9 HYPOVITAMINOSIS D: ICD-10-CM

## 2023-09-26 DIAGNOSIS — R53.83 OTHER FATIGUE: ICD-10-CM

## 2023-09-26 DIAGNOSIS — Z79.52 LONG TERM (CURRENT) USE OF SYSTEMIC STEROIDS: ICD-10-CM

## 2023-09-26 DIAGNOSIS — M15.9 GENERALIZED OSTEOARTHRITIS: ICD-10-CM

## 2023-09-26 DIAGNOSIS — Z79.899 HIGH RISK MEDICATION USE: ICD-10-CM

## 2023-09-26 DIAGNOSIS — R89.4 SEROLOGIC ABNORMALITY: ICD-10-CM

## 2023-09-26 DIAGNOSIS — Z23 ENCOUNTER FOR IMMUNIZATION: ICD-10-CM

## 2023-09-26 DIAGNOSIS — Z79.899 LONG TERM CURRENT USE OF IMMUNOSUPPRESSIVE DRUG: ICD-10-CM

## 2023-09-26 DIAGNOSIS — G62.9 NEUROPATHY: ICD-10-CM

## 2023-09-26 DIAGNOSIS — Z23 NEEDS FLU SHOT: ICD-10-CM

## 2023-09-26 PROCEDURE — 3074F SYST BP LT 130 MM HG: CPT | Performed by: INTERNAL MEDICINE

## 2023-09-26 PROCEDURE — 90471 IMMUNIZATION ADMIN: CPT | Performed by: INTERNAL MEDICINE

## 2023-09-26 PROCEDURE — 3078F DIAST BP <80 MM HG: CPT | Performed by: INTERNAL MEDICINE

## 2023-09-26 PROCEDURE — 90694 VACC AIIV4 NO PRSRV 0.5ML IM: CPT | Performed by: INTERNAL MEDICINE

## 2023-09-26 PROCEDURE — 99215 OFFICE O/P EST HI 40 MIN: CPT | Performed by: INTERNAL MEDICINE

## 2023-09-26 NOTE — PATIENT INSTRUCTIONS
Labs - cbc, cmp, ESR, crp, CCP, hep panel, vitd , lupus panel, uric acid   High-dose flu shot today  Continue gabapentin 600 mg once daily at 7 PM  Reading material on rheumatoid arthritis, methotrexate  Prescription for pneumonia vaccine  A week after pneumonia vaccine please start methotrexate 10 mg which is 4 pills, with daily folic acid 1 mg  Vitamin D 50,000 units once weekly for now  Return to clinic in 6 to 8 weeks call if any issues    8 weeks,Labs BS prior

## 2023-09-26 NOTE — PROGRESS NOTES
Sat, last dose taken was 10mg. Pred and Rudy was increased since visit due to pain in shoulders,elbows and hips. Worst joint Rt shoulder and B/L elbows, no joint swelling. Pt states both hips feel weak. ROS:     Musculoskeletal ROS:   .    Abnormal:  joint pain  . AM stiffness (hours): 60min  . Pain scale (0-10): 3  . Fatigue scale (0-10): 7  .    Job status: working   . Activities of daily living: some difficulty,    positive as above with the addition of   Otherwise negative for:  Fevers, chills or sweats. Weight  stable  No headaches or scalp tenderness. No jaw claudication. No acute visual changes. No red or dry eyes. No auditory complaints. No nasal discharge or rhinorrhea or dry mouth. No oral lesions or ulcers. No sore throat. No tongue pain. No cough. No shortness of breath, dyspnea on exertion or wheezing. No hemoptysis. No chest pains or palpitations. No lightheadedness. No pedal edema. No GERD symptoms, abdominal pain,diarrhea or constipation. No increased urinary frequency, nocturia, dysuria or changes in urine color. No alopecia, skin rashes/lesions. No changes in skin tightness. No Raynaud's. Photosensitivity. Current Outpatient Medications:     predniSONE (DELTASONE) 5 MG tablet, Start prednisone 15 mg daily for 3 days then down to 10 mg daily for 3 days then 5 mg daily until follow-up appt [take with food first thing in the morning), Disp: 150 tablet, Rfl: 0    gabapentin (NEURONTIN) 300 MG capsule, Start gabapentin 300 mg once daily at 7 PM, Disp: 90 capsule, Rfl: 0    meloxicam (MOBIC) 15 MG tablet, Take 1/2-1 whole tablet by mouth once a day as needed for pain. Max of 1 tablet per 24 hours. Take with food.   Avoid other pain medication except for Tylenol while using., Disp: 90 tablet, Rfl: 2    lisinopril-hydroCHLOROthiazide (PRINZIDE;ZESTORETIC) 20-25 MG per tablet, Take 1 tablet by mouth daily, Disp: 90 tablet, Rfl: 2    sertraline (ZOLOFT) 50 MG tablet,

## 2023-09-27 RX ORDER — PNEUMOCOCCAL 20-VALENT CONJUGATE VACCINE 2.2; 2.2; 2.2; 2.2; 2.2; 2.2; 2.2; 2.2; 2.2; 2.2; 2.2; 2.2; 2.2; 2.2; 2.2; 2.2; 4.4; 2.2; 2.2; 2.2 UG/.5ML; UG/.5ML; UG/.5ML; UG/.5ML; UG/.5ML; UG/.5ML; UG/.5ML; UG/.5ML; UG/.5ML; UG/.5ML; UG/.5ML; UG/.5ML; UG/.5ML; UG/.5ML; UG/.5ML; UG/.5ML; UG/.5ML; UG/.5ML; UG/.5ML; UG/.5ML
0.5 INJECTION, SUSPENSION INTRAMUSCULAR ONCE
Qty: 1 EACH | Refills: 0 | Status: SHIPPED | OUTPATIENT
Start: 2023-09-27 | End: 2023-09-27

## 2023-09-27 RX ORDER — ERGOCALCIFEROL 1.25 MG/1
50000 CAPSULE ORAL WEEKLY
Qty: 12 CAPSULE | Refills: 0 | Status: SHIPPED | OUTPATIENT
Start: 2023-09-27

## 2023-09-27 RX ORDER — GABAPENTIN 300 MG/1
CAPSULE ORAL
Qty: 180 CAPSULE | Refills: 0 | Status: SHIPPED | OUTPATIENT
Start: 2023-09-27 | End: 2024-09-26

## 2023-09-27 RX ORDER — FOLIC ACID 1 MG/1
1 TABLET ORAL DAILY
Qty: 90 TABLET | Refills: 0 | Status: SHIPPED | OUTPATIENT
Start: 2023-09-27

## 2023-09-28 ENCOUNTER — TELEPHONE (OUTPATIENT)
Dept: RHEUMATOLOGY | Age: 43
End: 2023-09-28

## 2023-10-13 ENCOUNTER — OFFICE VISIT (OUTPATIENT)
Dept: INTERNAL MEDICINE CLINIC | Facility: CLINIC | Age: 43
End: 2023-10-13
Payer: COMMERCIAL

## 2023-10-13 ENCOUNTER — HOSPITAL ENCOUNTER (OUTPATIENT)
Dept: GENERAL RADIOLOGY | Age: 43
Discharge: HOME | End: 2023-10-13
Payer: COMMERCIAL

## 2023-10-13 VITALS
HEIGHT: 72 IN | DIASTOLIC BLOOD PRESSURE: 80 MMHG | RESPIRATION RATE: 16 BRPM | OXYGEN SATURATION: 97 % | HEART RATE: 75 BPM | TEMPERATURE: 98.3 F | SYSTOLIC BLOOD PRESSURE: 130 MMHG | BODY MASS INDEX: 41.31 KG/M2 | WEIGHT: 305 LBS

## 2023-10-13 DIAGNOSIS — M54.12 CERVICAL RADICULOPATHY: ICD-10-CM

## 2023-10-13 DIAGNOSIS — M54.12 CERVICAL RADICULOPATHY: Primary | ICD-10-CM

## 2023-10-13 PROCEDURE — 99213 OFFICE O/P EST LOW 20 MIN: CPT | Performed by: NURSE PRACTITIONER

## 2023-10-13 PROCEDURE — 3079F DIAST BP 80-89 MM HG: CPT | Performed by: NURSE PRACTITIONER

## 2023-10-13 PROCEDURE — 72050 X-RAY EXAM NECK SPINE 4/5VWS: CPT

## 2023-10-13 PROCEDURE — 3075F SYST BP GE 130 - 139MM HG: CPT | Performed by: NURSE PRACTITIONER

## 2023-10-13 RX ORDER — METHOCARBAMOL 500 MG/1
500 TABLET, FILM COATED ORAL 3 TIMES DAILY
Qty: 30 TABLET | Refills: 0 | Status: SHIPPED | OUTPATIENT
Start: 2023-10-13 | End: 2023-10-23

## 2023-10-13 ASSESSMENT — ENCOUNTER SYMPTOMS
EYE REDNESS: 0
BACK PAIN: 0
NAUSEA: 0
SINUS PAIN: 0
EYE ITCHING: 0
DIARRHEA: 0
SHORTNESS OF BREATH: 0
ABDOMINAL DISTENTION: 0
EYE PAIN: 0
EYE DISCHARGE: 0
COUGH: 0
CONSTIPATION: 0
ABDOMINAL PAIN: 0
COLOR CHANGE: 0
APNEA: 0

## 2023-10-13 ASSESSMENT — PATIENT HEALTH QUESTIONNAIRE - PHQ9
SUM OF ALL RESPONSES TO PHQ QUESTIONS 1-9: 0
1. LITTLE INTEREST OR PLEASURE IN DOING THINGS: 0
SUM OF ALL RESPONSES TO PHQ QUESTIONS 1-9: 0
SUM OF ALL RESPONSES TO PHQ9 QUESTIONS 1 & 2: 0
2. FEELING DOWN, DEPRESSED OR HOPELESS: 0
SUM OF ALL RESPONSES TO PHQ QUESTIONS 1-9: 0
SUM OF ALL RESPONSES TO PHQ QUESTIONS 1-9: 0

## 2023-10-17 ENCOUNTER — OFFICE VISIT (OUTPATIENT)
Dept: ORTHOPEDIC SURGERY | Age: 43
End: 2023-10-17

## 2023-10-17 VITALS — HEIGHT: 72 IN | BODY MASS INDEX: 41.31 KG/M2 | WEIGHT: 305 LBS

## 2023-10-17 DIAGNOSIS — M47.812 CERVICAL SPONDYLOSIS: Primary | ICD-10-CM

## 2023-10-17 RX ORDER — TIZANIDINE 4 MG/1
4 TABLET ORAL 3 TIMES DAILY PRN
Qty: 90 TABLET | Refills: 2 | Status: SHIPPED | OUTPATIENT
Start: 2023-10-17

## 2023-10-17 NOTE — PROGRESS NOTES
Name: Vadim Smiley  YOB: 1980  Gender: male  MRN: [id number]    CC:   Chief Complaint   Patient presents with    New Patient     Neck pain          HPI:   Vadim Smiley is a 44 y.o. male with a PMHx of recently diagnosed RA, hypertension. They present here for evaluation of neck pain rating to the right shoulder. This is been going on approximately a week or so. It may have started after he was working out at [employer]. He exercises regularly. He has pain rating from the base of his neck in the trapezius distribution to the right shoulder. No significant rating pain down the upper arm forearm or hand. He complains of some twitching in the right arm intermittently. This pain is worse at night and with certain positions. He has been taking oral steroids, gabapentin, Tylenol, Robaxin and is on methotrexate for his RA. He has been told to avoid NSAIDs due to his methotrexate use. No issues with balance. He works in [employer]. Pain Scale: 8/10  ADL's affected:  Working, exercising  Conservative treatment attempted: Muscle relaxer, Tylenol, gabapentin          Past Medical History Includes:   Past Medical History:   Diagnosis Date    Anxiety 2020    Arthritis     shoulder    Chronic back pain     COVID-19 07/2020    GERD (gastroesophageal reflux disease)     tums prn    HTN (hypertension)     managed with meds    Nausea & vomiting     Sleep apnea     mild, has cpap but doesn't use it   ,   Past Surgical History:   Procedure Laterality Date    CHOLECYSTECTOMY, LAPAROSCOPIC N/A 4/24/2023    CHOLECYSTECTOMY LAPAROSCOPIC ROBOTIC performed by Gayla Billings MD at [address]      OTHER SURGICAL HISTORY      testicle twisted at age 7    SHOULDER ARTHROSCOPY Right     rt elbow at the same time    WISDOM TOOTH EXTRACTION       Family History:   Family History   Problem Relation Age of Onset    Breast Cancer Mother     Cancer

## 2023-10-20 ENCOUNTER — HOSPITAL ENCOUNTER (EMERGENCY)
Age: 43
Discharge: HOME | End: 2023-10-20
Attending: STUDENT IN AN ORGANIZED HEALTH CARE EDUCATION/TRAINING PROGRAM
Payer: COMMERCIAL

## 2023-10-20 ENCOUNTER — APPOINTMENT (OUTPATIENT)
Dept: GENERAL RADIOLOGY | Age: 43
End: 2023-10-20
Payer: COMMERCIAL

## 2023-10-20 VITALS
RESPIRATION RATE: 16 BRPM | WEIGHT: 305 LBS | HEIGHT: 72 IN | HEART RATE: 91 BPM | BODY MASS INDEX: 41.31 KG/M2 | OXYGEN SATURATION: 97 % | DIASTOLIC BLOOD PRESSURE: 91 MMHG | TEMPERATURE: 97.9 F | SYSTOLIC BLOOD PRESSURE: 146 MMHG

## 2023-10-20 DIAGNOSIS — M54.12 CERVICAL RADICULOPATHY: Primary | ICD-10-CM

## 2023-10-20 PROCEDURE — 6360000002 HC RX W HCPCS: Performed by: STUDENT IN AN ORGANIZED HEALTH CARE EDUCATION/TRAINING PROGRAM

## 2023-10-20 PROCEDURE — 99284 EMERGENCY DEPT VISIT MOD MDM: CPT

## 2023-10-20 PROCEDURE — 96372 THER/PROPH/DIAG INJ SC/IM: CPT

## 2023-10-20 PROCEDURE — 73030 X-RAY EXAM OF SHOULDER: CPT

## 2023-10-20 RX ORDER — KETOROLAC TROMETHAMINE 30 MG/ML
30 INJECTION, SOLUTION INTRAMUSCULAR; INTRAVENOUS ONCE
Status: COMPLETED | OUTPATIENT
Start: 2023-10-20 | End: 2023-10-20

## 2023-10-20 RX ORDER — DEXAMETHASONE SODIUM PHOSPHATE 10 MG/ML
10 INJECTION INTRAMUSCULAR; INTRAVENOUS ONCE
Status: COMPLETED | OUTPATIENT
Start: 2023-10-20 | End: 2023-10-20

## 2023-10-20 RX ORDER — HYDROCODONE BITARTRATE AND ACETAMINOPHEN 5; 325 MG/1; MG/1
1 TABLET ORAL EVERY 6 HOURS PRN
Qty: 10 TABLET | Refills: 0 | Status: SHIPPED | OUTPATIENT
Start: 2023-10-20 | End: 2023-10-25

## 2023-10-20 RX ADMIN — KETOROLAC TROMETHAMINE 30 MG: 30 INJECTION, SOLUTION INTRAMUSCULAR; INTRAVENOUS at 08:54

## 2023-10-20 RX ADMIN — DEXAMETHASONE SODIUM PHOSPHATE 10 MG: 10 INJECTION INTRAMUSCULAR; INTRAVENOUS at 08:54

## 2023-10-20 ASSESSMENT — LIFESTYLE VARIABLES
HOW OFTEN DO YOU HAVE A DRINK CONTAINING ALCOHOL: NEVER
HOW MANY STANDARD DRINKS CONTAINING ALCOHOL DO YOU HAVE ON A TYPICAL DAY: PATIENT DOES NOT DRINK

## 2023-10-20 ASSESSMENT — PAIN SCALES - GENERAL
PAINLEVEL_OUTOF10: 8
PAINLEVEL_OUTOF10: 9

## 2023-10-20 ASSESSMENT — PAIN - FUNCTIONAL ASSESSMENT: PAIN_FUNCTIONAL_ASSESSMENT: 0-10

## 2023-10-20 NOTE — ED PROVIDER NOTES
was used during the making of this note. This software is not perfect and grammatical and other typographical errors may be present. This note has not been completely proofread for errors.         Zheng Guaman  10/20/23 1556

## 2023-10-20 NOTE — DISCHARGE INSTRUCTIONS
Please initiate physical therapy as discussed and previously recommended by [facility] orthopedics. Take the medication for pain as needed. Complete your current prednisone taper as directed by your rheumatologist.  Perform the exercises listed daily for pain relief as well. Return to this department for worsening symptoms, concerns or questions. Arrange follow-up with [facility] as discussed.

## 2023-11-06 NOTE — PROGRESS NOTES
Chani Byrd (:  1980) is a 44 y.o. male,Established patient, here for evaluation of the following chief complaint(s):  Leg Injury (Bump on lower leg-Due to a dog- ran into his left lower leg.), Hypertension, and Otalgia         ASSESSMENT/PLAN:  1. Acute otitis externa of right ear, unspecified type  Suspect given swelling of external auditory canal, pain, drainage that patient has otitis externa  Start Ciprodex however advised patient to contact office if this is not financially feasible an alternative would be prescribed  Advised patient to alert provider if failure to improve after a few days as we may need to consider oral antibiotics in addition to topical based on progression  In the future counseled patient to avoid Q-tips, mentioned use of Debrox eardrops in the future to assist with cerumen removal    - ciprofloxacin-dexamethasone (CIPRODEX) 0.3-0.1 % otic suspension; Place 4 drops into the right ear 2 times daily for 7 days  Dispense: 7.5 mL; Refill: 0    2. Traumatic hematoma of left lower leg, initial encounter  Symptoms gradually improving. Recommended acetaminophen versus ibuprofen, topical heat  Patient to alert provider if increased redness, streaking, warmth so as to exclude secondary cellulitis    Addendum: Received message from patient's pharmacy that ciprodex not on formulary. Prescription for cipro ear drops (cetraxal; noncombination) sent to pharmacy in its place. If this is not covered we will try ofloxacin eardrops (given inability to visualize tympanic membrane would like to avoid other antibiotics that can be potentially ototoxic)      No follow-ups on file. Subjective   SUBJECTIVE/OBJECTIVE:  Patient is a 52-year-old -American male who presents to the office today for multiple concerns. Patient states for almost 1 week he has had right ear pain with sensation of swelling both internally and on the outside of the ear, pressure, some drainage.   Has had

## 2023-11-07 ENCOUNTER — OFFICE VISIT (OUTPATIENT)
Dept: INTERNAL MEDICINE CLINIC | Facility: CLINIC | Age: 43
End: 2023-11-07
Payer: COMMERCIAL

## 2023-11-07 ENCOUNTER — TELEPHONE (OUTPATIENT)
Dept: INTERNAL MEDICINE CLINIC | Facility: CLINIC | Age: 43
End: 2023-11-07

## 2023-11-07 VITALS
BODY MASS INDEX: 40.55 KG/M2 | SYSTOLIC BLOOD PRESSURE: 138 MMHG | HEIGHT: 72 IN | DIASTOLIC BLOOD PRESSURE: 70 MMHG | TEMPERATURE: 97.2 F | HEART RATE: 81 BPM | OXYGEN SATURATION: 96 % | WEIGHT: 299.4 LBS

## 2023-11-07 DIAGNOSIS — S80.12XA TRAUMATIC HEMATOMA OF LEFT LOWER LEG, INITIAL ENCOUNTER: ICD-10-CM

## 2023-11-07 DIAGNOSIS — H60.501 ACUTE OTITIS EXTERNA OF RIGHT EAR, UNSPECIFIED TYPE: Primary | ICD-10-CM

## 2023-11-07 PROCEDURE — 3078F DIAST BP <80 MM HG: CPT | Performed by: STUDENT IN AN ORGANIZED HEALTH CARE EDUCATION/TRAINING PROGRAM

## 2023-11-07 PROCEDURE — 99213 OFFICE O/P EST LOW 20 MIN: CPT | Performed by: STUDENT IN AN ORGANIZED HEALTH CARE EDUCATION/TRAINING PROGRAM

## 2023-11-07 PROCEDURE — 3075F SYST BP GE 130 - 139MM HG: CPT | Performed by: STUDENT IN AN ORGANIZED HEALTH CARE EDUCATION/TRAINING PROGRAM

## 2023-11-07 RX ORDER — PREDNISONE 5 MG/1
TABLET ORAL
Qty: 150 TABLET | Refills: 0 | Status: CANCELLED | OUTPATIENT
Start: 2023-11-07

## 2023-11-07 RX ORDER — CIPROFLOXACIN 0.5 MG/.25ML
0.25 SOLUTION/ DROPS AURICULAR (OTIC) 2 TIMES DAILY
Qty: 14 EACH | Refills: 0 | Status: SHIPPED | OUTPATIENT
Start: 2023-11-07

## 2023-11-07 RX ORDER — CIPROFLOXACIN AND DEXAMETHASONE 3; 1 MG/ML; MG/ML
4 SUSPENSION/ DROPS AURICULAR (OTIC) 2 TIMES DAILY
Qty: 7.5 ML | Refills: 0 | Status: SHIPPED | OUTPATIENT
Start: 2023-11-07 | End: 2023-11-07 | Stop reason: CLARIF

## 2023-11-07 ASSESSMENT — PATIENT HEALTH QUESTIONNAIRE - PHQ9
SUM OF ALL RESPONSES TO PHQ QUESTIONS 1-9: 0
SUM OF ALL RESPONSES TO PHQ9 QUESTIONS 1 & 2: 0
1. LITTLE INTEREST OR PLEASURE IN DOING THINGS: 0
SUM OF ALL RESPONSES TO PHQ QUESTIONS 1-9: 0
2. FEELING DOWN, DEPRESSED OR HOPELESS: 0
SUM OF ALL RESPONSES TO PHQ QUESTIONS 1-9: 0
SUM OF ALL RESPONSES TO PHQ QUESTIONS 1-9: 0

## 2023-11-07 ASSESSMENT — ENCOUNTER SYMPTOMS
SINUS PAIN: 0
SINUS PRESSURE: 0
SORE THROAT: 0

## 2023-11-07 NOTE — TELEPHONE ENCOUNTER
Erica from [facility] called, states Ciprofloxacin suspension is not covered by insurance. Asks if there is an alternative that can be prescribed.

## 2023-11-07 NOTE — TELEPHONE ENCOUNTER
Called and spoke with Erica at [facility], she states the denial does not say what is covered but she stated that it is probably denied due to it being a combination ear drop and advised we send just cipro ear drops.

## 2023-11-10 ENCOUNTER — TELEPHONE (OUTPATIENT)
Dept: INTERNAL MEDICINE CLINIC | Facility: CLINIC | Age: 43
End: 2023-11-10

## 2023-11-12 ENCOUNTER — TELEPHONE (OUTPATIENT)
Dept: INTERNAL MEDICINE CLINIC | Facility: CLINIC | Age: 43
End: 2023-11-12

## 2023-11-13 NOTE — TELEPHONE ENCOUNTER
Per Dr. Ninoska Gilbert: I called pharmacy and patient over the weekend and we switched him to ofloxacin which he should have already picked up.   Thanks     Simi Valiente

## 2023-11-13 NOTE — TELEPHONE ENCOUNTER
Can you please re-send ciprofloxacin drops to [facility] with how many drops pt is to use instead of measured amount? Thank you.

## 2023-12-06 DIAGNOSIS — Z79.899 HIGH RISK MEDICATION USE: ICD-10-CM

## 2023-12-06 DIAGNOSIS — Z23 ENCOUNTER FOR IMMUNIZATION: ICD-10-CM

## 2023-12-06 DIAGNOSIS — G62.9 NEUROPATHY: ICD-10-CM

## 2023-12-06 DIAGNOSIS — Z79.52 LONG TERM (CURRENT) USE OF SYSTEMIC STEROIDS: ICD-10-CM

## 2023-12-06 DIAGNOSIS — E55.9 HYPOVITAMINOSIS D: ICD-10-CM

## 2023-12-06 DIAGNOSIS — M06.4 INFLAMMATORY POLYARTHRITIS (HCC): ICD-10-CM

## 2023-12-06 DIAGNOSIS — Z23 NEEDS FLU SHOT: ICD-10-CM

## 2023-12-06 DIAGNOSIS — R89.4 SEROLOGIC ABNORMALITY: ICD-10-CM

## 2023-12-06 DIAGNOSIS — M15.9 GENERALIZED OSTEOARTHRITIS: ICD-10-CM

## 2023-12-06 DIAGNOSIS — R53.83 OTHER FATIGUE: ICD-10-CM

## 2023-12-06 DIAGNOSIS — Z79.899 LONG TERM CURRENT USE OF IMMUNOSUPPRESSIVE DRUG: ICD-10-CM

## 2023-12-06 LAB
ALBUMIN SERPL-MCNC: 4.2 G/DL (ref 3.5–5)
ALBUMIN/GLOB SERPL: 1.2 (ref 0.4–1.6)
ALP SERPL-CCNC: 83 U/L (ref 50–136)
ALT SERPL-CCNC: 36 U/L (ref 12–65)
ANION GAP SERPL CALC-SCNC: 5 MMOL/L (ref 2–11)
AST SERPL-CCNC: 26 U/L (ref 15–37)
BILIRUB SERPL-MCNC: 0.4 MG/DL (ref 0.2–1.1)
BUN SERPL-MCNC: 18 MG/DL (ref 6–23)
CALCIUM SERPL-MCNC: 9.7 MG/DL (ref 8.3–10.4)
CHLORIDE SERPL-SCNC: 104 MMOL/L (ref 103–113)
CO2 SERPL-SCNC: 28 MMOL/L (ref 21–32)
CREAT SERPL-MCNC: 1.5 MG/DL (ref 0.8–1.5)
CRP SERPL-MCNC: 0.9 MG/DL (ref 0–0.9)
ERYTHROCYTE [DISTWIDTH] IN BLOOD BY AUTOMATED COUNT: 13.7 % (ref 11.9–14.6)
ERYTHROCYTE [SEDIMENTATION RATE] IN BLOOD: 10 MM/HR (ref 0–20)
GLOBULIN SER CALC-MCNC: 3.4 G/DL (ref 2.8–4.5)
GLUCOSE SERPL-MCNC: 148 MG/DL (ref 65–100)
HCT VFR BLD AUTO: 43 % (ref 41.1–50.3)
HGB BLD-MCNC: 14.5 G/DL (ref 13.6–17.2)
MCH RBC QN AUTO: 31 PG (ref 26.1–32.9)
MCHC RBC AUTO-ENTMCNC: 33.7 G/DL (ref 31.4–35)
MCV RBC AUTO: 92.1 FL (ref 82–102)
NRBC # BLD: 0 K/UL (ref 0–0.2)
PLATELET # BLD AUTO: 134 K/UL (ref 150–450)
PMV BLD AUTO: 11.4 FL (ref 9.4–12.3)
POTASSIUM SERPL-SCNC: 3.8 MMOL/L (ref 3.5–5.1)
PROT SERPL-MCNC: 7.6 G/DL (ref 6.3–8.2)
RBC # BLD AUTO: 4.67 M/UL (ref 4.23–5.6)
SODIUM SERPL-SCNC: 137 MMOL/L (ref 136–146)
URATE SERPL-MCNC: 7.8 MG/DL (ref 2.6–6)
WBC # BLD AUTO: 8.5 K/UL (ref 4.3–11.1)

## 2023-12-07 ENCOUNTER — TELEMEDICINE (OUTPATIENT)
Dept: RHEUMATOLOGY | Age: 43
End: 2023-12-07
Payer: COMMERCIAL

## 2023-12-07 DIAGNOSIS — M15.9 GENERALIZED OSTEOARTHRITIS: ICD-10-CM

## 2023-12-07 DIAGNOSIS — Z71.85 VACCINE COUNSELING: Primary | ICD-10-CM

## 2023-12-07 DIAGNOSIS — Z79.899 HIGH RISK MEDICATION USE: ICD-10-CM

## 2023-12-07 DIAGNOSIS — Z23 ENCOUNTER FOR IMMUNIZATION: ICD-10-CM

## 2023-12-07 DIAGNOSIS — R53.83 OTHER FATIGUE: ICD-10-CM

## 2023-12-07 DIAGNOSIS — M06.4 INFLAMMATORY POLYARTHRITIS (HCC): ICD-10-CM

## 2023-12-07 DIAGNOSIS — R89.4 SEROLOGIC ABNORMALITY: ICD-10-CM

## 2023-12-07 DIAGNOSIS — E55.9 HYPOVITAMINOSIS D: ICD-10-CM

## 2023-12-07 DIAGNOSIS — Z91.199 COMPLIANCE POOR: ICD-10-CM

## 2023-12-07 DIAGNOSIS — Z23 NEEDS FLU SHOT: ICD-10-CM

## 2023-12-07 DIAGNOSIS — Z79.899 LONG TERM CURRENT USE OF IMMUNOSUPPRESSIVE DRUG: ICD-10-CM

## 2023-12-07 DIAGNOSIS — G62.9 NEUROPATHY: ICD-10-CM

## 2023-12-07 DIAGNOSIS — Z79.52 LONG TERM (CURRENT) USE OF SYSTEMIC STEROIDS: ICD-10-CM

## 2023-12-07 LAB
25(OH)D3 SERPL-MCNC: 56.8 NG/ML (ref 30–100)
CCP IGA+IGG SERPL IA-ACNC: 42 UNITS (ref 0–19)
CENTROMERE B AB SER-ACNC: <0.2 AI (ref 0–0.9)
CHROMATIN AB SERPL-ACNC: <0.2 AI (ref 0–0.9)
DSDNA AB SER-ACNC: <1 IU/ML (ref 0–9)
ENA JO1 AB SER-ACNC: <0.2 AI (ref 0–0.9)
ENA RNP AB SER-ACNC: <0.2 AI (ref 0–0.9)
ENA SCL70 AB SER-ACNC: <0.2 AI (ref 0–0.9)
ENA SM AB SER-ACNC: <0.2 AI (ref 0–0.9)
ENA SS-A AB SER-ACNC: <0.2 AI (ref 0–0.9)
ENA SS-B AB SER-ACNC: <0.2 AI (ref 0–0.9)
HAV IGM SER QL: REACTIVE
HBV CORE IGM SER QL: NONREACTIVE
HBV SURFACE AG SER QL: NONREACTIVE
HCV AB SER QL: NONREACTIVE
Lab: NORMAL
RHEUMATOID FACT SER QL LA: NEGATIVE

## 2023-12-07 PROCEDURE — 99215 OFFICE O/P EST HI 40 MIN: CPT | Performed by: INTERNAL MEDICINE

## 2023-12-07 NOTE — PROGRESS NOTES
Aashish Bartlett is a 44 y.o. male who was seen by synchronous (real-time) audio-video technology. Consent:  He and/or his healthcare decision maker is aware that this patient-initiated Telehealth encounter is a billable service, with coverage as determined by his insurance carrier. He is aware that he may receive a bill and has provided verbal consent to proceed: Yes    I was at home while conducting this encounter. Subjective:      HPI:      Permanent history  Mr. Frandy Holliday is a very pleasant 21-year-old -American gentleman with past medical history significant for hypertension, hyperlipidemia, arthritis who presents for evaluation. Patient was referred by his orthopedist for advanced and extensive arthritis/synovitis/tenosynovitis which was worked up and showed elevated anti-CCP. He reports severe pain in his left elbow and losing . He has a history of lower back, right elbow, and shoulder issues. The patient started noticing symptoms in his early 35s, approximately 10 years ago, including stiffness in his neck, tight shoulder muscles, and aching in his shoulders and elbows. He also reports shoulder impingement and the sensation of tendons jumping over something or moving around. Mr. Frandy Holliday experiences heat in his back, shoulders, and elbows at times, but denies joint swelling. He describes his joints as feeling tight and reports that his symptoms are worse in the morning, improving as he moves throughout the day. However, he stiffens up again towards the end of the day or after sitting for extended periods. The patient has a family history of arthritis in his mother, but no known family history of lupus, rheumatoid arthritis, or thyroid disease. He works as an  and has a history of more physically demanding jobs. He smoked cigarettes until his early 35s and currently consumes a couple of cigars a week and about six alcoholic beverages a week.  He has no

## 2023-12-07 NOTE — PATIENT INSTRUCTIONS
Labs -vitamin D before next visit  Continue prednisone 10 mg once daily for 2 days then 5 mg for 2 days then stop -please note can restart 5 g and once daily as needed if increasing pain and symptoms  Stop methotrexate -failed po and will get PA for injectable - mtx -10 mg once a week -do not restart yet     Continue Vitamin D 50,000 units once weekly for now  Return to clinic in 3-4  weeks in person - Call if any issues

## 2023-12-11 DIAGNOSIS — M06.4 INFLAMMATORY POLYARTHRITIS (HCC): ICD-10-CM

## 2023-12-11 DIAGNOSIS — M15.9 GENERALIZED OSTEOARTHRITIS: ICD-10-CM

## 2023-12-11 DIAGNOSIS — E55.9 HYPOVITAMINOSIS D: ICD-10-CM

## 2023-12-11 DIAGNOSIS — G62.9 NEUROPATHY: ICD-10-CM

## 2023-12-11 DIAGNOSIS — R89.4 SEROLOGIC ABNORMALITY: ICD-10-CM

## 2023-12-11 DIAGNOSIS — Z79.899 HIGH RISK MEDICATION USE: ICD-10-CM

## 2023-12-11 DIAGNOSIS — Z79.52 LONG TERM (CURRENT) USE OF SYSTEMIC STEROIDS: ICD-10-CM

## 2023-12-11 DIAGNOSIS — F51.04 CHRONIC INSOMNIA: ICD-10-CM

## 2023-12-11 DIAGNOSIS — Z23 ENCOUNTER FOR IMMUNIZATION: ICD-10-CM

## 2023-12-11 DIAGNOSIS — Z23 NEEDS FLU SHOT: ICD-10-CM

## 2023-12-11 DIAGNOSIS — R53.83 OTHER FATIGUE: ICD-10-CM

## 2023-12-11 DIAGNOSIS — Z79.899 LONG TERM CURRENT USE OF IMMUNOSUPPRESSIVE DRUG: ICD-10-CM

## 2023-12-11 RX ORDER — ZOLPIDEM TARTRATE 10 MG/1
TABLET ORAL
Qty: 30 TABLET | Refills: 1 | Status: SHIPPED | OUTPATIENT
Start: 2023-12-11 | End: 2024-06-12

## 2023-12-11 RX ORDER — PREDNISONE 5 MG/1
TABLET ORAL
Qty: 150 TABLET | Refills: 0 | Status: SHIPPED | OUTPATIENT
Start: 2023-12-11 | End: 2023-12-15 | Stop reason: ALTCHOICE

## 2023-12-11 RX ORDER — ERGOCALCIFEROL 1.25 MG/1
50000 CAPSULE ORAL WEEKLY
Qty: 12 CAPSULE | Refills: 0 | Status: SHIPPED | OUTPATIENT
Start: 2023-12-11 | End: 2023-12-18 | Stop reason: SDUPTHER

## 2023-12-11 RX ORDER — ERGOCALCIFEROL 1.25 MG/1
50000 CAPSULE ORAL WEEKLY
Qty: 12 CAPSULE | Refills: 0 | Status: SHIPPED | OUTPATIENT
Start: 2023-12-11

## 2023-12-11 NOTE — TELEPHONE ENCOUNTER
Pt sent my chart refill request , rx pend , labs below   Component      Latest Ref Rng 12/6/2023   Sodium      136 - 146 mmol/L 137    Potassium      3.5 - 5.1 mmol/L 3.8    Chloride      103 - 113 mmol/L 104    CO2      21 - 32 mmol/L 28    Anion Gap      2 - 11 mmol/L 5    Glucose, Random      65 - 100 mg/dL 148 (H)    BUN,BUNPL      6 - 23 MG/DL 18    Creatinine      0.8 - 1.5 MG/DL 1.50    Est, Glom Filt Rate      >60 ml/min/1.73m2 59 (L)    CALCIUM, SERUM, 165137      8.3 - 10.4 MG/DL 9.7    BILIRUBIN TOTAL      0.2 - 1.1 MG/DL 0.4    ALT      12 - 65 U/L 36    AST      15 - 37 U/L 26    Alk Phos      50 - 136 U/L 83    Total Protein      6.3 - 8.2 g/dL 7.6    Albumin      3.5 - 5.0 g/dL 4.2    Globulin      2.8 - 4.5 g/dL 3.4    ALBUMIN/GLOBULIN RATIO      0.4 - 1.6   1.2    WBC      4.3 - 11.1 K/uL 8.5    RBC      4.23 - 5.6 M/uL 4.67    Hemoglobin Quant      13.6 - 17.2 g/dL 14.5    Hematocrit      41.1 - 50.3 % 43.0    MCV      82 - 102 FL 92.1    MCH      26.1 - 32.9 PG 31.0    MCHC      31.4 - 35.0 g/dL 33.7    RDW      11.9 - 14.6 % 13.7    Platelet Count      381 - 450 K/uL 134 (L)    MPV      9.4 - 12.3 FL 11.4    Nucleated Red Blood Cells      0.0 - 0.2 K/uL 0.00    dsDNA Ab      0 - 9 IU/mL <1    Anti-RNP      0.0 - 0.9 AI <0.2    MARYJANE Lacey (SM) Ab      0.0 - 0.9 AI <0.2    MARYJANE SCL-70 Ab      0.0 - 0.9 AI <0.2    MARYJANE SSA (RO) Ab      0.0 - 0.9 AI <0.2    MARYJANE SSB (LA) Ab      0.0 - 0.9 AI <0.2    Chromatin Antibody      0.0 - 0.9 AI <0.2    MARYJANE MARIA DEL CARMEN-1 Ab      0.0 - 0.9 AI <0.2    CENTROMERE PROTEIN B ANTIBODY      0.0 - 0.9 AI <0.2    SEE BELOW        Comment    URIC ACID,URA      2.6 - 6.0 MG/DL 7.8 (H)    Rheumatoid Factor      Negative   Negative    CCP Antibodies IgG/IgA      0 - 19 units 42 (H)    Vit D, 25-Hydroxy      30.0 - 100.0 ng/mL 56.8    CRP      0.0 - 0.9 mg/dL 0.9    Sed Rate, Automated      0 - 20 mm/hr 10       Legend:  (H) High  (L) Low

## 2023-12-11 NOTE — TELEPHONE ENCOUNTER
Patient requesting refill of Ambien. Last seen in office for acute visit on 11/7/2023. Follow-up visit scheduled for 2/2/2024. PDMP reviewed showing zolpidem tartrate 10 mg tablets, quantity #30, 30-day supply last filled on 8/31/2023. New prescription sent to pharmacy on record. Orders Placed This Encounter    zolpidem (AMBIEN) 10 MG tablet     Sig: Take 1/2-1 whole tablet by mouth at nighttime as needed for sleep. Maximum of 1 tablet per 24 hours. Do not drink alcohol, drive or operate heavy machinery after use of this medication.      Dispense:  30 tablet     Refill:  1

## 2023-12-12 ENCOUNTER — PATIENT MESSAGE (OUTPATIENT)
Dept: RHEUMATOLOGY | Age: 43
End: 2023-12-12

## 2023-12-12 DIAGNOSIS — M54.50 LOW BACK PAIN, UNSPECIFIED BACK PAIN LATERALITY, UNSPECIFIED CHRONICITY, UNSPECIFIED WHETHER SCIATICA PRESENT: ICD-10-CM

## 2023-12-15 ENCOUNTER — APPOINTMENT (OUTPATIENT)
Dept: GENERAL RADIOLOGY | Age: 43
End: 2023-12-15
Payer: COMMERCIAL

## 2023-12-15 ENCOUNTER — HOSPITAL ENCOUNTER (EMERGENCY)
Age: 43
Discharge: HOME | End: 2023-12-15
Payer: COMMERCIAL

## 2023-12-15 VITALS
OXYGEN SATURATION: 97 % | HEART RATE: 80 BPM | TEMPERATURE: 98.4 F | DIASTOLIC BLOOD PRESSURE: 94 MMHG | SYSTOLIC BLOOD PRESSURE: 143 MMHG | WEIGHT: 300 LBS | BODY MASS INDEX: 40.63 KG/M2 | RESPIRATION RATE: 20 BRPM | HEIGHT: 72 IN

## 2023-12-15 DIAGNOSIS — M79.674 PAIN OF TOE OF RIGHT FOOT: Primary | ICD-10-CM

## 2023-12-15 PROCEDURE — 99284 EMERGENCY DEPT VISIT MOD MDM: CPT

## 2023-12-15 PROCEDURE — 73630 X-RAY EXAM OF FOOT: CPT

## 2023-12-15 PROCEDURE — 96372 THER/PROPH/DIAG INJ SC/IM: CPT

## 2023-12-15 PROCEDURE — 6360000002 HC RX W HCPCS: Performed by: NURSE PRACTITIONER

## 2023-12-15 RX ORDER — DEXAMETHASONE SODIUM PHOSPHATE 10 MG/ML
10 INJECTION INTRAMUSCULAR; INTRAVENOUS ONCE
Status: COMPLETED | OUTPATIENT
Start: 2023-12-15 | End: 2023-12-15

## 2023-12-15 RX ORDER — HYDROCODONE BITARTRATE AND ACETAMINOPHEN 5; 325 MG/1; MG/1
1 TABLET ORAL EVERY 8 HOURS PRN
Qty: 6 TABLET | Refills: 0 | Status: SHIPPED | OUTPATIENT
Start: 2023-12-15 | End: 2023-12-18

## 2023-12-15 RX ORDER — PREDNISONE 20 MG/1
40 TABLET ORAL DAILY
Qty: 8 TABLET | Refills: 0 | Status: SHIPPED | OUTPATIENT
Start: 2023-12-15 | End: 2023-12-19

## 2023-12-15 RX ORDER — KETOROLAC TROMETHAMINE 30 MG/ML
15 INJECTION, SOLUTION INTRAMUSCULAR; INTRAVENOUS ONCE
Status: COMPLETED | OUTPATIENT
Start: 2023-12-15 | End: 2023-12-15

## 2023-12-15 RX ADMIN — DEXAMETHASONE SODIUM PHOSPHATE 10 MG: 10 INJECTION INTRAMUSCULAR; INTRAVENOUS at 19:21

## 2023-12-15 RX ADMIN — KETOROLAC TROMETHAMINE 15 MG: 30 INJECTION, SOLUTION INTRAMUSCULAR at 19:21

## 2023-12-15 ASSESSMENT — PAIN SCALES - GENERAL: PAINLEVEL_OUTOF10: 9

## 2023-12-15 ASSESSMENT — PAIN DESCRIPTION - LOCATION: LOCATION: TOE (COMMENT WHICH ONE)

## 2023-12-15 ASSESSMENT — PAIN - FUNCTIONAL ASSESSMENT: PAIN_FUNCTIONAL_ASSESSMENT: 0-10

## 2023-12-15 NOTE — ED TRIAGE NOTES
Pt CO R great toe pain x3-4 days. Pt advises that the pain increased last night. Pt CO 9/10 pain. Pt advises that he has been having difficulty moving R great toe. Pt states his uric acid levels were high in last blood test. Pt ambulatory to triage.

## 2023-12-15 NOTE — ED PROVIDER NOTES
Emergency Department Provider Note       PCP: Ellyn Ruiz DO   Age: 44 y.o. Sex: male     DISPOSITION     dc    ICD-10-CM    1. Pain of toe of right foot  M79.674 HYDROcodone-acetaminophen (NORCO) 5-325 MG per tablet          Medical Decision Making     Complexity of Problems Addressed:  1 or more acute illnesses that pose a threat to life or bodily function. 1 or more chronic illnesses that pose a threat to life or bodily function. Data Reviewed and Analyzed:  I independently ordered and reviewed each unique test.  I reviewed external records: provider visit note from PCP. I interpreted the x-ray foot with no acute fracture noted as read by radiologist.    Discussion of management or test interpretation. As in HPI. With right great toe pain and pain at the base of the toe. Denies injury to be changed per denies fevers or chills. Denies history of gout. Endorsed history of RA. Called rheumatology and she suspects this is a an RA flare. Is a reassuring exam with neurovasc intact, there is no erythema, no wound or lesion. He has mild tenderness, no pain with passive range of motion, there is no significant swelling, he is not distal pulses and cap refill is brisk. Obtained an x-ray there is no fracture or foreign body, no obvious indication of infection. I have low clinical suspicion at this time for cellulitis, septic joint, occult fracture, osteomyelitis or other acute emergent process, though differential includes these as well as RA related pain or gout flare. Was given Toradol and steroid in the ED which has significantly improved his pain. Verbalizes readiness for discharge home. Will prescribe short course of analgesia and oral steroid, given strict return precautions, follow-up with PCP/rheumatologist on Monday. To return for new or worsening symptoms. Patient is well-hydrated appearing, no distress. Nontoxic-appearing, tolerating oral intake, hemodynamically stable.  All

## 2023-12-22 RX ORDER — PREDNISONE 10 MG/1
TABLET ORAL
Qty: 48 TABLET | Refills: 0 | Status: CANCELLED | OUTPATIENT
Start: 2023-12-22

## 2023-12-22 RX ORDER — GABAPENTIN 300 MG/1
CAPSULE ORAL
Qty: 90 CAPSULE | Refills: 1 | Status: CANCELLED | OUTPATIENT
Start: 2023-12-22 | End: 2024-03-22

## 2023-12-27 DIAGNOSIS — M06.4 INFLAMMATORY POLYARTHRITIS (HCC): Primary | ICD-10-CM

## 2023-12-27 RX ORDER — SYRINGE AND NEEDLE,INSULIN,1ML 28GX1/2"
SYRINGE, EMPTY DISPOSABLE MISCELLANEOUS
Qty: 100 EACH | Refills: 0 | Status: SHIPPED | OUTPATIENT
Start: 2023-12-27

## 2023-12-27 RX ORDER — METHOTREXATE 25 MG/ML
10 INJECTION, SOLUTION INTRA-ARTERIAL; INTRAMUSCULAR; INTRAVENOUS WEEKLY
Qty: 1.12 ML | Refills: 0 | Status: SHIPPED | OUTPATIENT
Start: 2023-12-27 | End: 2024-03-26

## 2023-12-27 NOTE — TELEPHONE ENCOUNTER
Per Dr. Rachelle Carver, Patient is to be starting on SQ Methotrexate:  Message from Stormy Hilario MD sent at 12/18/2023 12:06 PM EST -----  Regarding: FW: Foot question   Contact: [phone]  Please advise patient to restart methotrexate subcu, there should be a PA already in process for this. 10 mg once weekly injectable with daily 1 mg folic acid. The rx was never sent to the pharmacy. Entering the rx and pending to go to [facility]. Folic acid rx has already been sent. PA should not be needed.

## 2024-01-05 DIAGNOSIS — R89.4 SEROLOGIC ABNORMALITY: ICD-10-CM

## 2024-01-05 DIAGNOSIS — Z79.899 LONG TERM CURRENT USE OF IMMUNOSUPPRESSIVE DRUG: ICD-10-CM

## 2024-01-05 DIAGNOSIS — G62.9 NEUROPATHY: ICD-10-CM

## 2024-01-05 DIAGNOSIS — Z23 ENCOUNTER FOR IMMUNIZATION: ICD-10-CM

## 2024-01-05 DIAGNOSIS — Z23 NEEDS FLU SHOT: ICD-10-CM

## 2024-01-05 DIAGNOSIS — Z79.52 LONG TERM (CURRENT) USE OF SYSTEMIC STEROIDS: ICD-10-CM

## 2024-01-05 DIAGNOSIS — M06.4 INFLAMMATORY POLYARTHRITIS (HCC): ICD-10-CM

## 2024-01-05 DIAGNOSIS — R53.83 OTHER FATIGUE: ICD-10-CM

## 2024-01-05 DIAGNOSIS — Z79.899 HIGH RISK MEDICATION USE: ICD-10-CM

## 2024-01-05 DIAGNOSIS — E55.9 HYPOVITAMINOSIS D: ICD-10-CM

## 2024-01-05 DIAGNOSIS — M15.9 GENERALIZED OSTEOARTHRITIS: ICD-10-CM

## 2024-01-05 LAB — 25(OH)D3 SERPL-MCNC: 49.8 NG/ML (ref 30–100)

## 2024-01-09 DIAGNOSIS — E78.5 DYSLIPIDEMIA: ICD-10-CM

## 2024-01-09 RX ORDER — ATORVASTATIN CALCIUM 80 MG/1
80 TABLET, FILM COATED ORAL NIGHTLY
Qty: 90 TABLET | Refills: 2 | Status: SHIPPED | OUTPATIENT
Start: 2024-01-09

## 2024-01-10 ENCOUNTER — OFFICE VISIT (OUTPATIENT)
Dept: RHEUMATOLOGY | Age: 44
End: 2024-01-10
Payer: COMMERCIAL

## 2024-01-10 VITALS
HEIGHT: 72 IN | HEART RATE: 70 BPM | WEIGHT: 308 LBS | DIASTOLIC BLOOD PRESSURE: 87 MMHG | SYSTOLIC BLOOD PRESSURE: 120 MMHG | BODY MASS INDEX: 41.72 KG/M2

## 2024-01-10 DIAGNOSIS — Z23 ENCOUNTER FOR IMMUNIZATION: ICD-10-CM

## 2024-01-10 DIAGNOSIS — Z79.52 LONG TERM (CURRENT) USE OF SYSTEMIC STEROIDS: ICD-10-CM

## 2024-01-10 DIAGNOSIS — R10.11 RUQ ABDOMINAL PAIN: ICD-10-CM

## 2024-01-10 DIAGNOSIS — R53.83 OTHER FATIGUE: ICD-10-CM

## 2024-01-10 DIAGNOSIS — M15.9 GENERALIZED OSTEOARTHRITIS: ICD-10-CM

## 2024-01-10 DIAGNOSIS — E55.9 HYPOVITAMINOSIS D: ICD-10-CM

## 2024-01-10 DIAGNOSIS — Z79.899 LONG TERM CURRENT USE OF IMMUNOSUPPRESSIVE DRUG: ICD-10-CM

## 2024-01-10 DIAGNOSIS — G62.9 NEUROPATHY: ICD-10-CM

## 2024-01-10 DIAGNOSIS — R89.4 SEROLOGIC ABNORMALITY: ICD-10-CM

## 2024-01-10 DIAGNOSIS — Z79.899 HIGH RISK MEDICATION USE: ICD-10-CM

## 2024-01-10 DIAGNOSIS — Z23 NEEDS FLU SHOT: ICD-10-CM

## 2024-01-10 DIAGNOSIS — M06.4 INFLAMMATORY POLYARTHRITIS (HCC): ICD-10-CM

## 2024-01-10 PROCEDURE — 3079F DIAST BP 80-89 MM HG: CPT | Performed by: INTERNAL MEDICINE

## 2024-01-10 PROCEDURE — 3074F SYST BP LT 130 MM HG: CPT | Performed by: INTERNAL MEDICINE

## 2024-01-10 PROCEDURE — 99215 OFFICE O/P EST HI 40 MIN: CPT | Performed by: INTERNAL MEDICINE

## 2024-01-10 RX ORDER — GABAPENTIN 300 MG/1
CAPSULE ORAL
Qty: 180 CAPSULE | Refills: 0 | Status: SHIPPED | OUTPATIENT
Start: 2024-01-10 | End: 2025-01-08

## 2024-01-10 RX ORDER — FOLIC ACID 1 MG/1
1 TABLET ORAL DAILY
Qty: 90 TABLET | Refills: 0 | Status: SHIPPED | OUTPATIENT
Start: 2024-01-10

## 2024-01-10 RX ORDER — OMEPRAZOLE 20 MG/1
20 CAPSULE, DELAYED RELEASE ORAL
Qty: 90 CAPSULE | Refills: 0 | Status: SHIPPED | OUTPATIENT
Start: 2024-01-10

## 2024-01-10 RX ORDER — METHOTREXATE 25 MG/ML
10 INJECTION, SOLUTION INTRA-ARTERIAL; INTRAMUSCULAR; INTRAVENOUS WEEKLY
Qty: 6 ML | Refills: 0 | Status: SHIPPED | OUTPATIENT
Start: 2024-01-10

## 2024-01-10 RX ORDER — METHOTREXATE 25 MG/ML
10 INJECTION, SOLUTION INTRA-ARTERIAL; INTRAMUSCULAR; INTRAVENOUS WEEKLY
Qty: 6 ML | Refills: 0 | Status: SHIPPED | OUTPATIENT
Start: 2024-01-10 | End: 2024-04-09

## 2024-01-10 RX ORDER — PREDNISONE 5 MG/1
TABLET ORAL
Qty: 90 TABLET | Refills: 0 | Status: SHIPPED | OUTPATIENT
Start: 2024-01-10

## 2024-01-10 NOTE — PATIENT INSTRUCTIONS
Labs -BMP, CMP before next visit  Continue prednisone 5 mg once daily for now  Continue methotrexate 10 mg subcu once weekly daily folic acid  Finish the current prescription off Vitamin D 50,000 units once weekly once completed then 2000 units daily   Continue gabapentin 600 mg daily in the evening at 7 PM  Omeprazole 20 mg once daily  Return to clinic in 3-4  weeks in person - Call if any issues    4 weeks,Labs BS prior  2/7/24 130pm  Los- CBC CMP

## 2024-01-10 NOTE — PROGRESS NOTES
0  Tender Joint Count: 4      Diagnostic test results:  - X-ray: Previous issues with lower back, right elbow, and shoulder.  - MRI of left elbow: Extensive synovitis/tenosynovitis with soft tissue damage  - Blood work: High CCP, specific for rheumatoid arthritis.        Assessment:       Mr. Rausch is a very pleasant  43year-old -American gentleman with past medical history significant for hypertension, hyperlipidemia, arthritis who presents for follow-up.    Extensive review of records from PCP, laboratory data, orthopedist and imaging. patient was referred by his orthopedist for advanced and extensive arthritis/synovitis/tenosynovitis which was worked up and showed elevated anti-CCP.  Labs thus far show normal CBC, CMP, ANALI but elevated CCP 78 otherwise normal rheumatoid factor.    Clinical picture highly suggestive of seropositive RA, dramatic response to pred -was started on methotrexate at second visit however previously been noncompliant, therefore last visit we discussed at length regarding time frame of mtx efficacy and importance of compliance -thus was changed to subcu, patient now been taking regularly for the past 2 weeks tolerating is also on prednisone.  Continue as below in addition add omeprazole.    - Vitamin D replacement continue        Treatment plan was discussed in detail with patient. Agreement and understanding of the plan was verbalized by the patient.   Total visit time    42 minutes , greater than half of the time was spent on counseling.        Plan:       Labs -BMP, CMP before next visit  Continue prednisone 5 mg once daily for now  Continue methotrexate 10 mg subcu once weekly daily folic acid  Finish the current prescription off Vitamin D 50,000 units once weekly once completed then 2000 units daily   Continue gabapentin 600 mg daily in the evening at 7 PM  Omeprazole 20 mg once daily  Return to clinic in 3-4  weeks in person - Call if any issues

## 2024-02-01 DIAGNOSIS — R73.03 PREDIABETES: ICD-10-CM

## 2024-02-01 DIAGNOSIS — R53.83 OTHER FATIGUE: ICD-10-CM

## 2024-02-01 DIAGNOSIS — E55.9 HYPOVITAMINOSIS D: ICD-10-CM

## 2024-02-01 DIAGNOSIS — M15.9 GENERALIZED OSTEOARTHRITIS: ICD-10-CM

## 2024-02-01 DIAGNOSIS — Z79.899 HIGH RISK MEDICATION USE: ICD-10-CM

## 2024-02-01 DIAGNOSIS — Z79.899 LONG TERM CURRENT USE OF IMMUNOSUPPRESSIVE DRUG: ICD-10-CM

## 2024-02-01 DIAGNOSIS — G62.9 NEUROPATHY: ICD-10-CM

## 2024-02-01 DIAGNOSIS — Z23 NEEDS FLU SHOT: ICD-10-CM

## 2024-02-01 DIAGNOSIS — R89.4 SEROLOGIC ABNORMALITY: ICD-10-CM

## 2024-02-01 DIAGNOSIS — Z79.52 LONG TERM (CURRENT) USE OF SYSTEMIC STEROIDS: ICD-10-CM

## 2024-02-01 DIAGNOSIS — M06.4 INFLAMMATORY POLYARTHRITIS (HCC): ICD-10-CM

## 2024-02-01 DIAGNOSIS — I10 ESSENTIAL HYPERTENSION: ICD-10-CM

## 2024-02-01 DIAGNOSIS — R10.11 RUQ ABDOMINAL PAIN: ICD-10-CM

## 2024-02-01 DIAGNOSIS — Z23 ENCOUNTER FOR IMMUNIZATION: ICD-10-CM

## 2024-02-01 LAB
ALBUMIN SERPL-MCNC: 3.8 G/DL (ref 3.5–5)
ALBUMIN/GLOB SERPL: 1.4 (ref 0.4–1.6)
ALP SERPL-CCNC: 70 U/L (ref 50–136)
ALT SERPL-CCNC: 36 U/L (ref 12–65)
ANION GAP SERPL CALC-SCNC: 5 MMOL/L (ref 2–11)
AST SERPL-CCNC: 23 U/L (ref 15–37)
BILIRUB SERPL-MCNC: 0.3 MG/DL (ref 0.2–1.1)
BUN SERPL-MCNC: 27 MG/DL (ref 6–23)
CALCIUM SERPL-MCNC: 9 MG/DL (ref 8.3–10.4)
CHLORIDE SERPL-SCNC: 105 MMOL/L (ref 103–113)
CHOLEST SERPL-MCNC: 189 MG/DL
CO2 SERPL-SCNC: 28 MMOL/L (ref 21–32)
CREAT SERPL-MCNC: 1.4 MG/DL (ref 0.8–1.5)
ERYTHROCYTE [DISTWIDTH] IN BLOOD BY AUTOMATED COUNT: 14.1 % (ref 11.9–14.6)
EST. AVERAGE GLUCOSE BLD GHB EST-MCNC: 123 MG/DL
GLOBULIN SER CALC-MCNC: 2.8 G/DL (ref 2.8–4.5)
GLUCOSE SERPL-MCNC: 127 MG/DL (ref 65–100)
HBA1C MFR BLD: 5.9 % (ref 4.8–5.6)
HCT VFR BLD AUTO: 43.4 % (ref 41.1–50.3)
HDLC SERPL-MCNC: 43 MG/DL (ref 40–60)
HDLC SERPL: 4.4
HGB BLD-MCNC: 14.2 G/DL (ref 13.6–17.2)
LDLC SERPL CALC-MCNC: 103.2 MG/DL
MCH RBC QN AUTO: 30.5 PG (ref 26.1–32.9)
MCHC RBC AUTO-ENTMCNC: 32.7 G/DL (ref 31.4–35)
MCV RBC AUTO: 93.3 FL (ref 82–102)
NRBC # BLD: 0 K/UL (ref 0–0.2)
PLATELET # BLD AUTO: 137 K/UL (ref 150–450)
PMV BLD AUTO: 11.5 FL (ref 9.4–12.3)
POTASSIUM SERPL-SCNC: 3.7 MMOL/L (ref 3.5–5.1)
PROT SERPL-MCNC: 6.6 G/DL (ref 6.3–8.2)
RBC # BLD AUTO: 4.65 M/UL (ref 4.23–5.6)
SODIUM SERPL-SCNC: 138 MMOL/L (ref 136–146)
T4 FREE SERPL-MCNC: 0.9 NG/DL (ref 0.78–1.46)
TRIGL SERPL-MCNC: 214 MG/DL (ref 35–150)
TSH, 3RD GENERATION: 2.16 UIU/ML (ref 0.36–3.74)
VLDLC SERPL CALC-MCNC: 42.8 MG/DL (ref 6–23)
WBC # BLD AUTO: 5.7 K/UL (ref 4.3–11.1)

## 2024-02-07 ENCOUNTER — OFFICE VISIT (OUTPATIENT)
Dept: RHEUMATOLOGY | Age: 44
End: 2024-02-07
Payer: COMMERCIAL

## 2024-02-07 VITALS
WEIGHT: 310 LBS | HEIGHT: 72 IN | SYSTOLIC BLOOD PRESSURE: 140 MMHG | HEART RATE: 72 BPM | DIASTOLIC BLOOD PRESSURE: 91 MMHG | BODY MASS INDEX: 41.99 KG/M2

## 2024-02-07 DIAGNOSIS — R10.11 RUQ ABDOMINAL PAIN: ICD-10-CM

## 2024-02-07 DIAGNOSIS — Z79.52 LONG TERM (CURRENT) USE OF SYSTEMIC STEROIDS: ICD-10-CM

## 2024-02-07 DIAGNOSIS — R89.4 SEROLOGIC ABNORMALITY: ICD-10-CM

## 2024-02-07 DIAGNOSIS — G62.9 NEUROPATHY: ICD-10-CM

## 2024-02-07 DIAGNOSIS — Z79.899 LONG TERM CURRENT USE OF IMMUNOSUPPRESSIVE DRUG: ICD-10-CM

## 2024-02-07 DIAGNOSIS — E55.9 HYPOVITAMINOSIS D: ICD-10-CM

## 2024-02-07 DIAGNOSIS — M15.9 GENERALIZED OSTEOARTHRITIS: ICD-10-CM

## 2024-02-07 DIAGNOSIS — M05.9 SEROPOSITIVE RHEUMATOID ARTHRITIS (HCC): Primary | ICD-10-CM

## 2024-02-07 DIAGNOSIS — R53.83 OTHER FATIGUE: ICD-10-CM

## 2024-02-07 DIAGNOSIS — Z23 ENCOUNTER FOR IMMUNIZATION: ICD-10-CM

## 2024-02-07 DIAGNOSIS — Z79.899 HIGH RISK MEDICATION USE: ICD-10-CM

## 2024-02-07 PROCEDURE — 3077F SYST BP >= 140 MM HG: CPT | Performed by: INTERNAL MEDICINE

## 2024-02-07 PROCEDURE — 99215 OFFICE O/P EST HI 40 MIN: CPT | Performed by: INTERNAL MEDICINE

## 2024-02-07 PROCEDURE — 3080F DIAST BP >= 90 MM HG: CPT | Performed by: INTERNAL MEDICINE

## 2024-02-07 RX ORDER — OMEPRAZOLE 20 MG/1
20 CAPSULE, DELAYED RELEASE ORAL
Qty: 90 CAPSULE | Refills: 0 | Status: SHIPPED | OUTPATIENT
Start: 2024-02-07

## 2024-02-07 RX ORDER — PREDNISONE 5 MG/1
TABLET ORAL
Qty: 100 TABLET | Refills: 0 | Status: SHIPPED | OUTPATIENT
Start: 2024-02-07

## 2024-02-07 RX ORDER — METHOTREXATE 25 MG/ML
INJECTION, SOLUTION INTRA-ARTERIAL; INTRAMUSCULAR; INTRAVENOUS
Qty: 8 ML | Refills: 0 | Status: SHIPPED | OUTPATIENT
Start: 2024-02-07

## 2024-02-07 RX ORDER — GABAPENTIN 300 MG/1
CAPSULE ORAL
Qty: 180 CAPSULE | Refills: 0 | Status: SHIPPED | OUTPATIENT
Start: 2024-02-07 | End: 2025-02-05

## 2024-02-07 RX ORDER — FOLIC ACID 1 MG/1
1 TABLET ORAL DAILY
Qty: 90 TABLET | Refills: 0 | Status: SHIPPED | OUTPATIENT
Start: 2024-02-07

## 2024-02-07 RX ORDER — SYRINGE AND NEEDLE,INSULIN,1ML 28GX1/2"
SYRINGE, EMPTY DISPOSABLE MISCELLANEOUS
Qty: 100 EACH | Refills: 0 | Status: SHIPPED | OUTPATIENT
Start: 2024-02-07

## 2024-02-07 NOTE — PATIENT INSTRUCTIONS
Labs -BMP, CMP before next visit  Continue prednisone 5 mg once daily as needed   Continue methotrexate but increase to 15 mg subcu once weekly daily folic acid 1 mg  Finish the current prescription off Vitamin D 50,000 units once weekly once completed then 2000 units daily   Continue gabapentin 600 mg daily in the evening at 7 PM  Omeprazole 20 mg once daily  Return to clinic in 6 weeks- Call if any issues      6week,Labs BS prior  Los- CBC CMP

## 2024-02-07 NOTE — PROGRESS NOTES
elbows, no swollen joints. No pain currently.  Was taking prednisone until a couple days ago taking 10 mg at a time      ROS:     Musculoskeletal ROS:   .    Abnormal:  joint pain,back pain  .    AM stiffness (hours): 5 min  .    Pain scale (0-10): 0  .    Fatigue scale (0-10): 1  .    Job status: working full time   .    Activities of daily living: no difficulty,    positive as above with the addition of   Otherwise negative for:  Fevers, chills or sweats. Weight  stable  No headaches or scalp tenderness.  No jaw claudication.  No acute visual changes. No red or dry eyes.  No auditory complaints. No nasal discharge or rhinorrhea or dry mouth. No oral lesions or ulcers.  No sore throat. No tongue pain.  No cough. No shortness of breath, dyspnea on exertion or wheezing.  No hemoptysis.  No chest pains or palpitations. No lightheadedness. No pedal edema.  No GERD symptoms, abdominal pain,diarrhea or constipation.    No increased urinary frequency, nocturia, dysuria or changes in urine color.    No alopecia, skin rashes/lesions. No changes in skin tightness. No Raynaud's. Photosensitivity.    Current Medication[]Expand by Default      Current Outpatient Medications:     predniSONE (DELTASONE) 5 MG tablet, Start prednisone 15 mg daily for 3 days then down to 10 mg daily for 3 days then 5 mg daily until follow-up appt [take with food first thing in the morning), Disp: 150 tablet, Rfl: 0    lisinopril-hydroCHLOROthiazide (PRINZIDE;ZESTORETIC) 20-25 MG per tablet, Take 1 tablet by mouth daily, Disp: 90 tablet, Rfl: 2    sertraline (ZOLOFT) 50 MG tablet, Take 1 tablet by mouth daily, Disp: 90 tablet, Rfl: 2    zolpidem (AMBIEN) 10 MG tablet, Take 1/2-1 whole tablet by mouth at nighttime as needed for sleep.  Maximum of 1 tablet per 24 hours.  Do not take alongside alcohol, drive or operate heavy machinery after use, Disp: 30 tablet, Rfl: 1    amLODIPine (NORVASC) 10 MG tablet, Take 1 tablet by mouth daily, Disp: 90 tablet,

## 2024-02-18 ASSESSMENT — PATIENT HEALTH QUESTIONNAIRE - PHQ9
8. MOVING OR SPEAKING SO SLOWLY THAT OTHER PEOPLE COULD HAVE NOTICED. OR THE OPPOSITE, BEING SO FIGETY OR RESTLESS THAT YOU HAVE BEEN MOVING AROUND A LOT MORE THAN USUAL: 0
1. LITTLE INTEREST OR PLEASURE IN DOING THINGS: 0
10. IF YOU CHECKED OFF ANY PROBLEMS, HOW DIFFICULT HAVE THESE PROBLEMS MADE IT FOR YOU TO DO YOUR WORK, TAKE CARE OF THINGS AT HOME, OR GET ALONG WITH OTHER PEOPLE: 0
SUM OF ALL RESPONSES TO PHQ QUESTIONS 1-9: 2
5. POOR APPETITE OR OVEREATING: NOT AT ALL
2. FEELING DOWN, DEPRESSED OR HOPELESS: NOT AT ALL
SUM OF ALL RESPONSES TO PHQ9 QUESTIONS 1 & 2: 0
4. FEELING TIRED OR HAVING LITTLE ENERGY: 1
10. IF YOU CHECKED OFF ANY PROBLEMS, HOW DIFFICULT HAVE THESE PROBLEMS MADE IT FOR YOU TO DO YOUR WORK, TAKE CARE OF THINGS AT HOME, OR GET ALONG WITH OTHER PEOPLE: NOT DIFFICULT AT ALL
2. FEELING DOWN, DEPRESSED OR HOPELESS: 0
9. THOUGHTS THAT YOU WOULD BE BETTER OFF DEAD, OR OF HURTING YOURSELF: NOT AT ALL
4. FEELING TIRED OR HAVING LITTLE ENERGY: SEVERAL DAYS
SUM OF ALL RESPONSES TO PHQ QUESTIONS 1-9: 2
3. TROUBLE FALLING OR STAYING ASLEEP: 1
8. MOVING OR SPEAKING SO SLOWLY THAT OTHER PEOPLE COULD HAVE NOTICED. OR THE OPPOSITE - BEING SO FIDGETY OR RESTLESS THAT YOU HAVE BEEN MOVING AROUND A LOT MORE THAN USUAL: NOT AT ALL
9. THOUGHTS THAT YOU WOULD BE BETTER OFF DEAD, OR OF HURTING YOURSELF: 0
6. FEELING BAD ABOUT YOURSELF - OR THAT YOU ARE A FAILURE OR HAVE LET YOURSELF OR YOUR FAMILY DOWN: NOT AT ALL
SUM OF ALL RESPONSES TO PHQ QUESTIONS 1-9: 2
7. TROUBLE CONCENTRATING ON THINGS, SUCH AS READING THE NEWSPAPER OR WATCHING TELEVISION: 0
1. LITTLE INTEREST OR PLEASURE IN DOING THINGS: NOT AT ALL
SUM OF ALL RESPONSES TO PHQ QUESTIONS 1-9: 2
6. FEELING BAD ABOUT YOURSELF - OR THAT YOU ARE A FAILURE OR HAVE LET YOURSELF OR YOUR FAMILY DOWN: 0
SUM OF ALL RESPONSES TO PHQ QUESTIONS 1-9: 2
3. TROUBLE FALLING OR STAYING ASLEEP: SEVERAL DAYS
7. TROUBLE CONCENTRATING ON THINGS, SUCH AS READING THE NEWSPAPER OR WATCHING TELEVISION: NOT AT ALL
5. POOR APPETITE OR OVEREATING: 0

## 2024-02-18 NOTE — PROGRESS NOTES
guarding or rebound.   Musculoskeletal:      Cervical back: No rigidity. Spinous process tenderness (Approximately T1-T2) present. No muscular tenderness. Normal range of motion.   Skin:     General: Skin is dry.      Coloration: Skin is not jaundiced.   Neurological:      Mental Status: He is alert.   Psychiatric:         Attention and Perception: Attention normal.         Mood and Affect: Mood and affect normal. Mood is not anxious or depressed. Affect is not tearful.         Speech: Speech normal. Speech is not rapid and pressured or slurred.         Behavior: Behavior normal. Behavior is not agitated, aggressive or combative. Behavior is cooperative.         Thought Content: Thought content normal.                  An electronic signature was used to authenticate this note.    --Bernardino Muñoz, DO

## 2024-02-19 ENCOUNTER — OFFICE VISIT (OUTPATIENT)
Dept: INTERNAL MEDICINE CLINIC | Facility: CLINIC | Age: 44
End: 2024-02-19
Payer: COMMERCIAL

## 2024-02-19 VITALS
DIASTOLIC BLOOD PRESSURE: 94 MMHG | TEMPERATURE: 98 F | WEIGHT: 309.6 LBS | HEART RATE: 86 BPM | SYSTOLIC BLOOD PRESSURE: 122 MMHG | OXYGEN SATURATION: 94 % | BODY MASS INDEX: 41.93 KG/M2 | RESPIRATION RATE: 16 BRPM | HEIGHT: 72 IN

## 2024-02-19 DIAGNOSIS — G47.33 OBSTRUCTIVE SLEEP APNEA: ICD-10-CM

## 2024-02-19 DIAGNOSIS — F51.04 CHRONIC INSOMNIA: ICD-10-CM

## 2024-02-19 DIAGNOSIS — M19.90 INFLAMMATORY ARTHROPATHY: ICD-10-CM

## 2024-02-19 DIAGNOSIS — G89.29 CHRONIC NECK PAIN: ICD-10-CM

## 2024-02-19 DIAGNOSIS — F41.9 ANXIETY AND DEPRESSION: ICD-10-CM

## 2024-02-19 DIAGNOSIS — R73.03 PREDIABETES: ICD-10-CM

## 2024-02-19 DIAGNOSIS — G89.29 CHRONIC ABDOMINAL PAIN: ICD-10-CM

## 2024-02-19 DIAGNOSIS — I10 ESSENTIAL HYPERTENSION: Primary | ICD-10-CM

## 2024-02-19 DIAGNOSIS — R10.9 CHRONIC ABDOMINAL PAIN: ICD-10-CM

## 2024-02-19 DIAGNOSIS — M54.2 CHRONIC NECK PAIN: ICD-10-CM

## 2024-02-19 DIAGNOSIS — F32.A ANXIETY AND DEPRESSION: ICD-10-CM

## 2024-02-19 PROBLEM — E66.01 MORBID OBESITY (HCC): Status: RESOLVED | Noted: 2017-06-22 | Resolved: 2024-02-19

## 2024-02-19 PROBLEM — K80.20 CHOLECYSTOLITHIASIS: Status: RESOLVED | Noted: 2023-03-23 | Resolved: 2024-02-19

## 2024-02-19 PROCEDURE — 3074F SYST BP LT 130 MM HG: CPT | Performed by: STUDENT IN AN ORGANIZED HEALTH CARE EDUCATION/TRAINING PROGRAM

## 2024-02-19 PROCEDURE — 99214 OFFICE O/P EST MOD 30 MIN: CPT | Performed by: STUDENT IN AN ORGANIZED HEALTH CARE EDUCATION/TRAINING PROGRAM

## 2024-02-19 PROCEDURE — 3080F DIAST BP >= 90 MM HG: CPT | Performed by: STUDENT IN AN ORGANIZED HEALTH CARE EDUCATION/TRAINING PROGRAM

## 2024-02-19 RX ORDER — DICYCLOMINE HCL 20 MG
20 TABLET ORAL 4 TIMES DAILY PRN
Qty: 120 TABLET | Refills: 1 | Status: SHIPPED | OUTPATIENT
Start: 2024-02-19

## 2024-02-19 RX ORDER — ZOLPIDEM TARTRATE 10 MG/1
TABLET ORAL
Qty: 30 TABLET | Refills: 1 | Status: CANCELLED | OUTPATIENT
Start: 2024-02-19 | End: 2024-08-21

## 2024-02-19 ASSESSMENT — ENCOUNTER SYMPTOMS
VOMITING: 0
CONSTIPATION: 1
ANAL BLEEDING: 1
NAUSEA: 0
ABDOMINAL PAIN: 1

## 2024-03-18 RX ORDER — CARVEDILOL 12.5 MG/1
12.5 TABLET ORAL 2 TIMES DAILY WITH MEALS
Qty: 180 TABLET | Refills: 2 | Status: SHIPPED | OUTPATIENT
Start: 2024-03-18

## 2024-03-26 DIAGNOSIS — R89.4 SEROLOGIC ABNORMALITY: ICD-10-CM

## 2024-03-26 DIAGNOSIS — R10.11 RUQ ABDOMINAL PAIN: ICD-10-CM

## 2024-03-26 DIAGNOSIS — R53.83 OTHER FATIGUE: ICD-10-CM

## 2024-03-26 DIAGNOSIS — M62.89 DECREASED MUSCLE TONE: ICD-10-CM

## 2024-03-26 DIAGNOSIS — Z79.899 LONG TERM CURRENT USE OF IMMUNOSUPPRESSIVE DRUG: ICD-10-CM

## 2024-03-26 DIAGNOSIS — Z23 NEEDS FLU SHOT: ICD-10-CM

## 2024-03-26 DIAGNOSIS — Z79.899 HIGH RISK MEDICATION USE: ICD-10-CM

## 2024-03-26 DIAGNOSIS — E55.9 HYPOVITAMINOSIS D: ICD-10-CM

## 2024-03-26 DIAGNOSIS — Z23 ENCOUNTER FOR IMMUNIZATION: ICD-10-CM

## 2024-03-26 DIAGNOSIS — R68.82 DECREASED LIBIDO: ICD-10-CM

## 2024-03-26 DIAGNOSIS — G62.9 NEUROPATHY: ICD-10-CM

## 2024-03-26 DIAGNOSIS — M15.9 GENERALIZED OSTEOARTHRITIS: ICD-10-CM

## 2024-03-26 DIAGNOSIS — Z79.52 LONG TERM (CURRENT) USE OF SYSTEMIC STEROIDS: ICD-10-CM

## 2024-03-26 DIAGNOSIS — M06.4 INFLAMMATORY POLYARTHRITIS (HCC): ICD-10-CM

## 2024-03-26 DIAGNOSIS — R53.82 CHRONIC FATIGUE: ICD-10-CM

## 2024-03-26 LAB
ALBUMIN SERPL-MCNC: 4.1 G/DL (ref 3.5–5)
ALBUMIN/GLOB SERPL: 1.4 (ref 0.4–1.6)
ALP SERPL-CCNC: 75 U/L (ref 50–136)
ALT SERPL-CCNC: 43 U/L (ref 12–65)
ANION GAP SERPL CALC-SCNC: 6 MMOL/L (ref 2–11)
AST SERPL-CCNC: 28 U/L (ref 15–37)
BILIRUB SERPL-MCNC: 0.6 MG/DL (ref 0.2–1.1)
BUN SERPL-MCNC: 17 MG/DL (ref 6–23)
CALCIUM SERPL-MCNC: 10.5 MG/DL (ref 8.3–10.4)
CHLORIDE SERPL-SCNC: 101 MMOL/L (ref 103–113)
CO2 SERPL-SCNC: 30 MMOL/L (ref 21–32)
CREAT SERPL-MCNC: 1.6 MG/DL (ref 0.8–1.5)
ERYTHROCYTE [DISTWIDTH] IN BLOOD BY AUTOMATED COUNT: 14.9 % (ref 11.9–14.6)
GLOBULIN SER CALC-MCNC: 3 G/DL (ref 2.8–4.5)
GLUCOSE SERPL-MCNC: 87 MG/DL (ref 65–100)
HCT VFR BLD AUTO: 44.3 % (ref 41.1–50.3)
HGB BLD-MCNC: 15.1 G/DL (ref 13.6–17.2)
MCH RBC QN AUTO: 31.7 PG (ref 26.1–32.9)
MCHC RBC AUTO-ENTMCNC: 34.1 G/DL (ref 31.4–35)
MCV RBC AUTO: 92.9 FL (ref 82–102)
NRBC # BLD: 0 K/UL (ref 0–0.2)
PLATELET # BLD AUTO: 141 K/UL (ref 150–450)
PMV BLD AUTO: 11.6 FL (ref 9.4–12.3)
POTASSIUM SERPL-SCNC: 4 MMOL/L (ref 3.5–5.1)
PROT SERPL-MCNC: 7.1 G/DL (ref 6.3–8.2)
RBC # BLD AUTO: 4.77 M/UL (ref 4.23–5.6)
SODIUM SERPL-SCNC: 137 MMOL/L (ref 136–146)
WBC # BLD AUTO: 5.3 K/UL (ref 4.3–11.1)

## 2024-03-28 ENCOUNTER — TELEMEDICINE (OUTPATIENT)
Dept: RHEUMATOLOGY | Age: 44
End: 2024-03-28

## 2024-03-28 DIAGNOSIS — R10.11 RUQ ABDOMINAL PAIN: ICD-10-CM

## 2024-03-28 DIAGNOSIS — Z79.52 LONG TERM (CURRENT) USE OF SYSTEMIC STEROIDS: ICD-10-CM

## 2024-03-28 DIAGNOSIS — Z79.899 HIGH RISK MEDICATION USE: ICD-10-CM

## 2024-03-28 DIAGNOSIS — M15.9 GENERALIZED OSTEOARTHRITIS: ICD-10-CM

## 2024-03-28 DIAGNOSIS — M06.4 INFLAMMATORY POLYARTHRITIS (HCC): ICD-10-CM

## 2024-03-28 DIAGNOSIS — R53.83 OTHER FATIGUE: ICD-10-CM

## 2024-03-28 DIAGNOSIS — E55.9 HYPOVITAMINOSIS D: ICD-10-CM

## 2024-03-28 DIAGNOSIS — Z79.899 LONG TERM CURRENT USE OF IMMUNOSUPPRESSIVE DRUG: ICD-10-CM

## 2024-03-28 DIAGNOSIS — Z23 ENCOUNTER FOR IMMUNIZATION: ICD-10-CM

## 2024-03-28 DIAGNOSIS — R89.4 SEROLOGIC ABNORMALITY: ICD-10-CM

## 2024-03-28 DIAGNOSIS — G62.9 NEUROPATHY: ICD-10-CM

## 2024-03-28 DIAGNOSIS — Z23 NEEDS FLU SHOT: ICD-10-CM

## 2024-03-28 LAB
TESTOST FREE SERPL-MCNC: 7.8 PG/ML (ref 6.8–21.5)
TESTOST SERPL-MCNC: 276 NG/DL (ref 264–916)

## 2024-03-28 RX ORDER — MULTIVIT-MIN/IRON/FOLIC ACID/K 18-600-40
CAPSULE ORAL
COMMUNITY

## 2024-03-28 NOTE — PATIENT INSTRUCTIONS
Labs -CBC, CMP, vitamin D, sed rate before next visit  Continue prednisone 5 mg once daily as needed   Continue methotrexate but increase to 20 mg subcu once weekly daily folic acid 1 mg  Vitamin D 2000 units daily   Continue gabapentin 600 mg daily in the evening at 7 PM  Stop sertraline and instead start Cymbalta 30 mg once daily for a week then increase to 60 mg daily thereafter  Return to clinic in 6 weeks- Call if any issues

## 2024-03-28 NOTE — PROGRESS NOTES
Kevin Rausch is a 43 y.o. male who was seen by synchronous (real-time) audio-video technology.    Consent:  He and/or his healthcare decision maker is aware that this patient-initiated Telehealth encounter is a billable service, with coverage as determined by his insurance carrier. He is aware that he may receive a bill and has provided verbal consent to proceed: Yes    I was at home while conducting this encounter.         Subjective:      HPI:      Permanent history  Mr. Rausch is a very pleasant 43-year-old -American gentleman with past medical history significant for hypertension, hyperlipidemia, arthritis who presents for evaluation.    Patient was referred by his orthopedist for advanced and extensive arthritis/synovitis/tenosynovitis which was worked up and showed elevated anti-CCP.    He reports severe pain in his left elbow and losing . He has a history of lower back, right elbow, and shoulder issues. The patient started noticing symptoms in his early 30s, approximately 10 years ago, including stiffness in his neck, tight shoulder muscles, and aching in his shoulders and elbows. He also reports shoulder impingement and the sensation of tendons jumping over something or moving around.    Mr. Rausch experiences heat in his back, shoulders, and elbows at times, but denies joint swelling. He describes his joints as feeling tight and reports that his symptoms are worse in the morning, improving as he moves throughout the day. However, he stiffens up again towards the end of the day or after sitting for extended periods.    The patient has a family history of arthritis in his mother, but no known family history of lupus, rheumatoid arthritis, or thyroid disease. He works as an  and has a history of more physically demanding jobs. He smoked cigarettes until his early 30s and currently consumes a couple of cigars a week and about six alcoholic beverages a week. He has no

## 2024-03-29 RX ORDER — FOLIC ACID 1 MG/1
1 TABLET ORAL DAILY
Qty: 90 TABLET | Refills: 0 | Status: SHIPPED | OUTPATIENT
Start: 2024-03-29

## 2024-03-29 RX ORDER — DULOXETIN HYDROCHLORIDE 30 MG/1
CAPSULE, DELAYED RELEASE ORAL
Qty: 7 CAPSULE | Refills: 0 | Status: SHIPPED | OUTPATIENT
Start: 2024-03-29

## 2024-03-29 RX ORDER — DULOXETIN HYDROCHLORIDE 60 MG/1
CAPSULE, DELAYED RELEASE ORAL
Qty: 90 CAPSULE | Refills: 0 | Status: SHIPPED | OUTPATIENT
Start: 2024-03-29

## 2024-03-29 RX ORDER — SYRINGE AND NEEDLE,INSULIN,1ML 28GX1/2"
SYRINGE, EMPTY DISPOSABLE MISCELLANEOUS
Qty: 100 EACH | Refills: 0 | Status: SHIPPED | OUTPATIENT
Start: 2024-03-29

## 2024-03-29 RX ORDER — GABAPENTIN 300 MG/1
CAPSULE ORAL
Qty: 180 CAPSULE | Refills: 0 | Status: SHIPPED | OUTPATIENT
Start: 2024-03-29 | End: 2025-03-27

## 2024-03-29 RX ORDER — PREDNISONE 5 MG/1
TABLET ORAL
Qty: 90 TABLET | Refills: 0 | Status: SHIPPED | OUTPATIENT
Start: 2024-03-29

## 2024-03-29 RX ORDER — METHOTREXATE 25 MG/ML
INJECTION, SOLUTION INTRA-ARTERIAL; INTRAMUSCULAR; INTRAVENOUS
Qty: 8 ML | Refills: 0 | Status: SHIPPED | OUTPATIENT
Start: 2024-03-29

## 2024-04-12 DIAGNOSIS — R53.82 CHRONIC FATIGUE: Primary | ICD-10-CM

## 2024-04-12 DIAGNOSIS — E78.5 DYSLIPIDEMIA: ICD-10-CM

## 2024-04-12 DIAGNOSIS — I10 ESSENTIAL HYPERTENSION: ICD-10-CM

## 2024-04-12 DIAGNOSIS — G56.93 BILATERAL NEUROPATHY OF UPPER EXTREMITIES: ICD-10-CM

## 2024-04-16 ENCOUNTER — NURSE ONLY (OUTPATIENT)
Dept: INTERNAL MEDICINE CLINIC | Facility: CLINIC | Age: 44
End: 2024-04-16

## 2024-04-16 DIAGNOSIS — E78.5 DYSLIPIDEMIA: ICD-10-CM

## 2024-04-16 DIAGNOSIS — I10 ESSENTIAL HYPERTENSION: ICD-10-CM

## 2024-04-16 DIAGNOSIS — G56.93 BILATERAL NEUROPATHY OF UPPER EXTREMITIES: ICD-10-CM

## 2024-04-16 DIAGNOSIS — R53.82 CHRONIC FATIGUE: ICD-10-CM

## 2024-04-16 LAB
ALBUMIN SERPL-MCNC: 3.9 G/DL (ref 3.5–5)
ALBUMIN/GLOB SERPL: 1.3 (ref 0.4–1.6)
ALP SERPL-CCNC: 70 U/L (ref 50–136)
ALT SERPL-CCNC: 32 U/L (ref 12–65)
ANION GAP SERPL CALC-SCNC: 6 MMOL/L (ref 2–11)
AST SERPL-CCNC: 27 U/L (ref 15–37)
BASOPHILS # BLD: 0 K/UL (ref 0–0.2)
BASOPHILS NFR BLD: 0 % (ref 0–2)
BILIRUB SERPL-MCNC: 0.3 MG/DL (ref 0.2–1.1)
BUN SERPL-MCNC: 21 MG/DL (ref 6–23)
CALCIUM SERPL-MCNC: 9.3 MG/DL (ref 8.3–10.4)
CHLORIDE SERPL-SCNC: 104 MMOL/L (ref 103–113)
CHOLEST SERPL-MCNC: 174 MG/DL
CO2 SERPL-SCNC: 28 MMOL/L (ref 21–32)
CREAT SERPL-MCNC: 1.4 MG/DL (ref 0.8–1.5)
DIFFERENTIAL METHOD BLD: ABNORMAL
EOSINOPHIL # BLD: 0.1 K/UL (ref 0–0.8)
EOSINOPHIL NFR BLD: 2 % (ref 0.5–7.8)
ERYTHROCYTE [DISTWIDTH] IN BLOOD BY AUTOMATED COUNT: 14.3 % (ref 11.9–14.6)
GLOBULIN SER CALC-MCNC: 3 G/DL (ref 2.8–4.5)
GLUCOSE SERPL-MCNC: 94 MG/DL (ref 65–100)
HCT VFR BLD AUTO: 44.5 % (ref 41.1–50.3)
HDLC SERPL-MCNC: 35 MG/DL (ref 40–60)
HDLC SERPL: 5
HGB BLD-MCNC: 15.1 G/DL (ref 13.6–17.2)
IMM GRANULOCYTES # BLD AUTO: 0.1 K/UL (ref 0–0.5)
IMM GRANULOCYTES NFR BLD AUTO: 1 % (ref 0–5)
LDLC SERPL CALC-MCNC: 77.8 MG/DL
LYMPHOCYTES # BLD: 2.2 K/UL (ref 0.5–4.6)
LYMPHOCYTES NFR BLD: 42 % (ref 13–44)
MCH RBC QN AUTO: 31.7 PG (ref 26.1–32.9)
MCHC RBC AUTO-ENTMCNC: 33.9 G/DL (ref 31.4–35)
MCV RBC AUTO: 93.3 FL (ref 82–102)
MONOCYTES # BLD: 0.4 K/UL (ref 0.1–1.3)
MONOCYTES NFR BLD: 7 % (ref 4–12)
NEUTS SEG # BLD: 2.5 K/UL (ref 1.7–8.2)
NEUTS SEG NFR BLD: 48 % (ref 43–78)
NRBC # BLD: 0 K/UL (ref 0–0.2)
PLATELET # BLD AUTO: 132 K/UL (ref 150–450)
PMV BLD AUTO: 11.5 FL (ref 9.4–12.3)
POTASSIUM SERPL-SCNC: 3.6 MMOL/L (ref 3.5–5.1)
PROT SERPL-MCNC: 6.9 G/DL (ref 6.3–8.2)
RBC # BLD AUTO: 4.77 M/UL (ref 4.23–5.6)
SODIUM SERPL-SCNC: 138 MMOL/L (ref 136–146)
TRIGL SERPL-MCNC: 306 MG/DL (ref 35–150)
VIT B12 SERPL-MCNC: 661 PG/ML (ref 193–986)
VLDLC SERPL CALC-MCNC: 61.2 MG/DL (ref 6–23)
WBC # BLD AUTO: 5.2 K/UL (ref 4.3–11.1)

## 2024-05-03 ENCOUNTER — OFFICE VISIT (OUTPATIENT)
Age: 44
End: 2024-05-03

## 2024-05-03 DIAGNOSIS — G56.22 ULNAR NEUROPATHY AT ELBOW, LEFT: ICD-10-CM

## 2024-05-03 DIAGNOSIS — M19.90 INFLAMMATORY ARTHROPATHY: ICD-10-CM

## 2024-05-03 DIAGNOSIS — M77.12 LEFT LATERAL EPICONDYLITIS: Primary | ICD-10-CM

## 2024-05-03 RX ORDER — BETAMETHASONE SODIUM PHOSPHATE AND BETAMETHASONE ACETATE 3; 3 MG/ML; MG/ML
12 INJECTION, SUSPENSION INTRA-ARTICULAR; INTRALESIONAL; INTRAMUSCULAR; SOFT TISSUE ONCE
Status: COMPLETED | OUTPATIENT
Start: 2024-05-03 | End: 2024-05-03

## 2024-05-03 RX ADMIN — BETAMETHASONE SODIUM PHOSPHATE AND BETAMETHASONE ACETATE 12 MG: 3; 3 INJECTION, SUSPENSION INTRA-ARTICULAR; INTRALESIONAL; INTRAMUSCULAR; SOFT TISSUE at 12:29

## 2024-05-04 NOTE — PROGRESS NOTES
[facility] Note    Name: Kevin Rausch  Age: 43 y.o.  YOB: 1980  Gender: male  MRN: [id number]      Follow up visit:   1. Left lateral epicondylitis    2. Inflammatory arthropathy    3. Ulnar neuropathy at elbow, left        HPI: Kevin Rausch is a 43 y.o. male who is following up for multiple issues, on the last visit I refer the patient to rheumatology for assessment of rheumatoid arthritis, I was performed a lateral epicondylar injection, patient reports injections provided great relief for several months, he is not under treatment with Dr. Polk with methotrexate.      ROS/Meds/PSH/PMH/FH/SH: I personally reviewed the patients standard intake form.  Pertinents are discussed in the HPI    Physical Examination:  General: Awake and alert.  HEENT: Normocephalic, atraumatic  CV/Pulm: Breathing even and unlabored  Skin: No obvious rashes noted.  Lymphatic: No obvious evidence of lymphedema or lymphadenopathy    Musculoskeletal Examination:  Examination on the left upper extremity demonstrates cap refill < 5 seconds in all fingers, moderate severe tenderness palpation of the lateral epicondyle, this is aggravated with resisted wrist intention and chairlift test.    Imaging / Electrodiagnostic Tests:     MRI was reviewed with the patient, this demonstrates a partial-thickness tear of the common extensor mass over the lateral condyle    Assessment:   1. Left lateral epicondylitis    2. Inflammatory arthropathy    3. Ulnar neuropathy at elbow, left        Plan:   We discussed the diagnosis and different treatment options. We discussed observation, therapy, antiinflammatory medications and other pertinent treatment modalities.    After discussing in detail the patient elects to proceed with left lateral condyle steroid injection, we discussed all treatment options in detail, if steroid injections fail to provide long-lasting relief then surgical debridement and tendon

## 2024-05-17 SDOH — ECONOMIC STABILITY: INCOME INSECURITY: HOW HARD IS IT FOR YOU TO PAY FOR THE VERY BASICS LIKE FOOD, HOUSING, MEDICAL CARE, AND HEATING?: PATIENT DECLINED

## 2024-05-17 SDOH — ECONOMIC STABILITY: FOOD INSECURITY: WITHIN THE PAST 12 MONTHS, YOU WORRIED THAT YOUR FOOD WOULD RUN OUT BEFORE YOU GOT MONEY TO BUY MORE.: PATIENT DECLINED

## 2024-05-17 SDOH — ECONOMIC STABILITY: FOOD INSECURITY: WITHIN THE PAST 12 MONTHS, THE FOOD YOU BOUGHT JUST DIDN'T LAST AND YOU DIDN'T HAVE MONEY TO GET MORE.: PATIENT DECLINED

## 2024-05-17 SDOH — ECONOMIC STABILITY: TRANSPORTATION INSECURITY
IN THE PAST 12 MONTHS, HAS LACK OF TRANSPORTATION KEPT YOU FROM MEETINGS, WORK, OR FROM GETTING THINGS NEEDED FOR DAILY LIVING?: NO

## 2024-05-19 NOTE — PROGRESS NOTES
Kevin Rausch (:  1980) is a 43 y.o. male,Established patient, here for evaluation of the following chief complaint(s):  Follow-up (Pt is here for 3 month follow up and A1c check. ) and Hip Pain      Assessment & Plan   1. Essential hypertension  Continue carvedilol, amlodipine, lisinopril/HCTZ at current dose given blood pressure controlled in the office today    2. Chronic insomnia  3. Obstructive sleep apnea  Per patient obstructive sleep apnea borderline, not currently on CPAP  Currently on as needed Ambien.  Monitor carefully for worsening snoring or apneic episodes and if so consider repeat polysomnogram  Controlled substance agreement previously signed    4. Prediabetes  A1c 5.9% on 2024, recheck in office today 6.1%  Continue lifestyle modifications    - AMB POC HEMOGLOBIN A1C    5. Inflammatory arthropathy  Labs from 2023 notable for elevated anti-CCP antibody, elevated uric acid at 8.6; negative ANALI, HLA-B27, rheumatoid factor; normal sed rate, CRP   Prior therapies include diclofenac and meloxicam  Rheumatology notes suggest clinical picture consistent with seropositive RA.    Currently on methotrexate injection, folic acid, as needed prednisone per rheumatology  Suspect hip pain multifactorial in setting of tight hip flexors/iliopsoas syndrome as well as inflammatory arthropathy  Home exercises provided.  Suggested patient do a trial for a day or so of the as needed prednisone given by his rheumatologist to see if this makes a difference    6. Anxiety and depression  Previously on Zoloft, since switched to Cymbalta by rheumatology given radicular symptoms  Tolerating with stable mood symptoms    7. Ulnar neuropathy at elbow of left upper extremity  EMG/NCS on 2023 with left cubital tunnel syndrome with motor demyelination and axonal loss over the elbow region  MRI left elbow on 2023 as follows:  -Low-lying lateral head of the triceps with mild triceps insertional

## 2024-05-20 ENCOUNTER — OFFICE VISIT (OUTPATIENT)
Dept: INTERNAL MEDICINE CLINIC | Facility: CLINIC | Age: 44
End: 2024-05-20
Payer: COMMERCIAL

## 2024-05-20 VITALS
BODY MASS INDEX: 40.93 KG/M2 | SYSTOLIC BLOOD PRESSURE: 120 MMHG | HEART RATE: 84 BPM | WEIGHT: 302.2 LBS | DIASTOLIC BLOOD PRESSURE: 80 MMHG | TEMPERATURE: 97.9 F | RESPIRATION RATE: 16 BRPM | HEIGHT: 72 IN | OXYGEN SATURATION: 97 %

## 2024-05-20 DIAGNOSIS — F41.9 ANXIETY AND DEPRESSION: ICD-10-CM

## 2024-05-20 DIAGNOSIS — M19.90 INFLAMMATORY ARTHROPATHY: ICD-10-CM

## 2024-05-20 DIAGNOSIS — I10 ESSENTIAL HYPERTENSION: Primary | ICD-10-CM

## 2024-05-20 DIAGNOSIS — Z79.899 HIGH RISK MEDICATION USE: ICD-10-CM

## 2024-05-20 DIAGNOSIS — Z79.52 LONG TERM (CURRENT) USE OF SYSTEMIC STEROIDS: ICD-10-CM

## 2024-05-20 DIAGNOSIS — R89.4 SEROLOGIC ABNORMALITY: ICD-10-CM

## 2024-05-20 DIAGNOSIS — I77.810 AORTIC ROOT DILATION (HCC): ICD-10-CM

## 2024-05-20 DIAGNOSIS — R53.83 OTHER FATIGUE: ICD-10-CM

## 2024-05-20 DIAGNOSIS — M15.9 GENERALIZED OSTEOARTHRITIS: ICD-10-CM

## 2024-05-20 DIAGNOSIS — M06.4 INFLAMMATORY POLYARTHRITIS (HCC): ICD-10-CM

## 2024-05-20 DIAGNOSIS — R10.11 RUQ ABDOMINAL PAIN: ICD-10-CM

## 2024-05-20 DIAGNOSIS — E55.9 HYPOVITAMINOSIS D: ICD-10-CM

## 2024-05-20 DIAGNOSIS — F51.04 CHRONIC INSOMNIA: ICD-10-CM

## 2024-05-20 DIAGNOSIS — Z23 NEEDS FLU SHOT: ICD-10-CM

## 2024-05-20 DIAGNOSIS — G47.33 OBSTRUCTIVE SLEEP APNEA: ICD-10-CM

## 2024-05-20 DIAGNOSIS — R73.03 PREDIABETES: ICD-10-CM

## 2024-05-20 DIAGNOSIS — Z23 ENCOUNTER FOR IMMUNIZATION: ICD-10-CM

## 2024-05-20 DIAGNOSIS — G62.9 NEUROPATHY: ICD-10-CM

## 2024-05-20 DIAGNOSIS — G56.22 ULNAR NEUROPATHY AT ELBOW OF LEFT UPPER EXTREMITY: ICD-10-CM

## 2024-05-20 DIAGNOSIS — Z79.899 LONG TERM CURRENT USE OF IMMUNOSUPPRESSIVE DRUG: ICD-10-CM

## 2024-05-20 DIAGNOSIS — F32.A ANXIETY AND DEPRESSION: ICD-10-CM

## 2024-05-20 LAB
25(OH)D3 SERPL-MCNC: 47.5 NG/ML (ref 30–100)
ALBUMIN SERPL-MCNC: 4.4 G/DL (ref 3.5–5)
ALBUMIN/GLOB SERPL: 1.8 (ref 1–1.9)
ALP SERPL-CCNC: 68 U/L (ref 40–129)
ALT SERPL-CCNC: 21 U/L (ref 12–65)
ANION GAP SERPL CALC-SCNC: 11 MMOL/L (ref 9–18)
AST SERPL-CCNC: 31 U/L (ref 15–37)
BILIRUB SERPL-MCNC: 0.5 MG/DL (ref 0–1.2)
BUN SERPL-MCNC: 20 MG/DL (ref 6–23)
CALCIUM SERPL-MCNC: 10.2 MG/DL (ref 8.8–10.2)
CHLORIDE SERPL-SCNC: 100 MMOL/L (ref 98–107)
CO2 SERPL-SCNC: 28 MMOL/L (ref 20–28)
CREAT SERPL-MCNC: 1.47 MG/DL (ref 0.8–1.3)
ERYTHROCYTE [DISTWIDTH] IN BLOOD BY AUTOMATED COUNT: 14 % (ref 11.9–14.6)
ERYTHROCYTE [SEDIMENTATION RATE] IN BLOOD: 5 MM/HR (ref 0–20)
GLOBULIN SER CALC-MCNC: 2.5 G/DL (ref 2.3–3.5)
GLUCOSE SERPL-MCNC: 88 MG/DL (ref 70–99)
HBA1C MFR BLD: 6.1 %
HCT VFR BLD AUTO: 45 % (ref 41.1–50.3)
HGB BLD-MCNC: 15.3 G/DL (ref 13.6–17.2)
MCH RBC QN AUTO: 32.3 PG (ref 26.1–32.9)
MCHC RBC AUTO-ENTMCNC: 34 G/DL (ref 31.4–35)
MCV RBC AUTO: 95.1 FL (ref 82–102)
NRBC # BLD: 0 K/UL (ref 0–0.2)
PLATELET # BLD AUTO: 128 K/UL (ref 150–450)
PMV BLD AUTO: 12.2 FL (ref 9.4–12.3)
POTASSIUM SERPL-SCNC: 3.8 MMOL/L (ref 3.5–5.1)
PROT SERPL-MCNC: 6.8 G/DL (ref 6.3–8.2)
RBC # BLD AUTO: 4.73 M/UL (ref 4.23–5.6)
SODIUM SERPL-SCNC: 139 MMOL/L (ref 136–145)
WBC # BLD AUTO: 7.6 K/UL (ref 4.3–11.1)

## 2024-05-20 PROCEDURE — 3079F DIAST BP 80-89 MM HG: CPT | Performed by: STUDENT IN AN ORGANIZED HEALTH CARE EDUCATION/TRAINING PROGRAM

## 2024-05-20 PROCEDURE — 83036 HEMOGLOBIN GLYCOSYLATED A1C: CPT | Performed by: STUDENT IN AN ORGANIZED HEALTH CARE EDUCATION/TRAINING PROGRAM

## 2024-05-20 PROCEDURE — 99214 OFFICE O/P EST MOD 30 MIN: CPT | Performed by: STUDENT IN AN ORGANIZED HEALTH CARE EDUCATION/TRAINING PROGRAM

## 2024-05-20 PROCEDURE — 3074F SYST BP LT 130 MM HG: CPT | Performed by: STUDENT IN AN ORGANIZED HEALTH CARE EDUCATION/TRAINING PROGRAM

## 2024-05-20 ASSESSMENT — PATIENT HEALTH QUESTIONNAIRE - PHQ9
2. FEELING DOWN, DEPRESSED OR HOPELESS: NOT AT ALL
SUM OF ALL RESPONSES TO PHQ QUESTIONS 1-9: 0
1. LITTLE INTEREST OR PLEASURE IN DOING THINGS: NOT AT ALL
SUM OF ALL RESPONSES TO PHQ QUESTIONS 1-9: 0
SUM OF ALL RESPONSES TO PHQ9 QUESTIONS 1 & 2: 0
SUM OF ALL RESPONSES TO PHQ QUESTIONS 1-9: 0
SUM OF ALL RESPONSES TO PHQ QUESTIONS 1-9: 0

## 2024-05-20 ASSESSMENT — ENCOUNTER SYMPTOMS: SHORTNESS OF BREATH: 0

## 2024-05-23 ENCOUNTER — TELEMEDICINE (OUTPATIENT)
Dept: RHEUMATOLOGY | Age: 44
End: 2024-05-23
Payer: COMMERCIAL

## 2024-05-23 DIAGNOSIS — E55.9 HYPOVITAMINOSIS D: ICD-10-CM

## 2024-05-23 DIAGNOSIS — R53.83 OTHER FATIGUE: ICD-10-CM

## 2024-05-23 DIAGNOSIS — R89.4 SEROLOGIC ABNORMALITY: ICD-10-CM

## 2024-05-23 DIAGNOSIS — Z23 ENCOUNTER FOR IMMUNIZATION: ICD-10-CM

## 2024-05-23 DIAGNOSIS — Z79.899 LONG TERM CURRENT USE OF IMMUNOSUPPRESSIVE DRUG: ICD-10-CM

## 2024-05-23 DIAGNOSIS — Z79.52 LONG TERM (CURRENT) USE OF SYSTEMIC STEROIDS: ICD-10-CM

## 2024-05-23 DIAGNOSIS — M06.4 INFLAMMATORY POLYARTHRITIS (HCC): Primary | ICD-10-CM

## 2024-05-23 DIAGNOSIS — M15.9 GENERALIZED OSTEOARTHRITIS: ICD-10-CM

## 2024-05-23 DIAGNOSIS — Z79.899 HIGH RISK MEDICATION USE: ICD-10-CM

## 2024-05-23 DIAGNOSIS — G62.9 NEUROPATHY: ICD-10-CM

## 2024-05-23 PROCEDURE — 99215 OFFICE O/P EST HI 40 MIN: CPT | Performed by: INTERNAL MEDICINE

## 2024-05-23 RX ORDER — SYRINGE AND NEEDLE,INSULIN,1ML 28GX1/2"
SYRINGE, EMPTY DISPOSABLE MISCELLANEOUS
Qty: 100 EACH | Refills: 0 | Status: SHIPPED | OUTPATIENT
Start: 2024-05-23

## 2024-05-23 RX ORDER — METHOTREXATE 25 MG/ML
INJECTION, SOLUTION INTRA-ARTERIAL; INTRAMUSCULAR; INTRAVENOUS
Qty: 12 ML | Refills: 0 | Status: SHIPPED | OUTPATIENT
Start: 2024-05-23

## 2024-05-23 RX ORDER — PREDNISONE 5 MG/1
TABLET ORAL
Qty: 90 TABLET | Refills: 0 | Status: SHIPPED | OUTPATIENT
Start: 2024-05-23

## 2024-05-23 RX ORDER — FOLIC ACID 1 MG/1
1 TABLET ORAL DAILY
Qty: 90 TABLET | Refills: 0 | Status: SHIPPED | OUTPATIENT
Start: 2024-05-23

## 2024-05-23 RX ORDER — DULOXETIN HYDROCHLORIDE 60 MG/1
CAPSULE, DELAYED RELEASE ORAL
Qty: 90 CAPSULE | Refills: 0 | Status: SHIPPED | OUTPATIENT
Start: 2024-05-23

## 2024-05-23 RX ORDER — GABAPENTIN 300 MG/1
CAPSULE ORAL
Qty: 180 CAPSULE | Refills: 0 | Status: SHIPPED | OUTPATIENT
Start: 2024-05-23 | End: 2025-05-21

## 2024-05-23 NOTE — PATIENT INSTRUCTIONS
Labs -CBC, CMP, sed rate before next visit  Continue prednisone 5 mg once daily as needed   Continue methotrexate 20 mg subcu once weekly daily folic acid 1 mg  Vitamin D 2000 units daily   Continue gabapentin 600 mg daily in the evening at 7 PM  Continue Cymbalta 60 mg daily    Return to clinic in 3 months- Call if any issues    3MO,Labs BS prior

## 2024-07-01 DIAGNOSIS — I10 ESSENTIAL HYPERTENSION: ICD-10-CM

## 2024-07-01 RX ORDER — LISINOPRIL AND HYDROCHLOROTHIAZIDE 20; 25 MG/1; MG/1
1 TABLET ORAL DAILY
Qty: 90 TABLET | Refills: 1 | OUTPATIENT
Start: 2024-07-01

## 2024-08-14 DIAGNOSIS — E78.5 DYSLIPIDEMIA: ICD-10-CM

## 2024-08-14 DIAGNOSIS — I10 ESSENTIAL HYPERTENSION: ICD-10-CM

## 2024-08-14 RX ORDER — AMLODIPINE BESYLATE 10 MG/1
10 TABLET ORAL DAILY
Qty: 90 TABLET | Refills: 2 | Status: SHIPPED | OUTPATIENT
Start: 2024-08-14

## 2024-08-14 RX ORDER — ATORVASTATIN CALCIUM 80 MG/1
80 TABLET, FILM COATED ORAL NIGHTLY
Qty: 90 TABLET | Refills: 2 | Status: SHIPPED | OUTPATIENT
Start: 2024-08-14

## 2024-08-14 RX ORDER — CARVEDILOL 12.5 MG/1
12.5 TABLET ORAL 2 TIMES DAILY WITH MEALS
Qty: 180 TABLET | Refills: 2 | Status: CANCELLED | OUTPATIENT
Start: 2024-08-14

## 2024-08-14 RX ORDER — LISINOPRIL AND HYDROCHLOROTHIAZIDE 25; 20 MG/1; MG/1
1 TABLET ORAL DAILY
Qty: 90 TABLET | Refills: 2 | Status: SHIPPED | OUTPATIENT
Start: 2024-08-14

## 2024-09-09 ENCOUNTER — PATIENT MESSAGE (OUTPATIENT)
Dept: INTERNAL MEDICINE CLINIC | Facility: CLINIC | Age: 44
End: 2024-09-09

## 2024-09-09 DIAGNOSIS — M19.90 INFLAMMATORY ARTHROPATHY: Primary | ICD-10-CM

## 2024-09-09 DIAGNOSIS — F41.9 ANXIETY AND DEPRESSION: ICD-10-CM

## 2024-09-09 DIAGNOSIS — F32.A ANXIETY AND DEPRESSION: ICD-10-CM

## 2024-09-10 RX ORDER — DULOXETIN HYDROCHLORIDE 60 MG/1
60 CAPSULE, DELAYED RELEASE ORAL DAILY
Qty: 90 CAPSULE | Refills: 1 | Status: SHIPPED | OUTPATIENT
Start: 2024-09-10

## 2024-10-02 DIAGNOSIS — E78.5 DYSLIPIDEMIA: ICD-10-CM

## 2024-10-02 DIAGNOSIS — I10 ESSENTIAL HYPERTENSION: Primary | ICD-10-CM

## 2024-10-15 DIAGNOSIS — I10 ESSENTIAL HYPERTENSION: ICD-10-CM

## 2024-10-15 DIAGNOSIS — E78.5 DYSLIPIDEMIA: ICD-10-CM

## 2024-10-15 LAB
ALBUMIN SERPL-MCNC: 4.4 G/DL (ref 3.5–5)
ALBUMIN/GLOB SERPL: 1.6 (ref 1–1.9)
ALP SERPL-CCNC: 75 U/L (ref 40–129)
ALT SERPL-CCNC: 26 U/L (ref 8–55)
ANION GAP SERPL CALC-SCNC: 14 MMOL/L (ref 9–18)
AST SERPL-CCNC: 36 U/L (ref 15–37)
BASOPHILS # BLD: 0 K/UL (ref 0–0.2)
BASOPHILS NFR BLD: 1 % (ref 0–2)
BILIRUB SERPL-MCNC: 0.4 MG/DL (ref 0–1.2)
BUN SERPL-MCNC: 16 MG/DL (ref 6–23)
CALCIUM SERPL-MCNC: 9.6 MG/DL (ref 8.8–10.2)
CHLORIDE SERPL-SCNC: 102 MMOL/L (ref 98–107)
CHOLEST SERPL-MCNC: 198 MG/DL (ref 0–200)
CO2 SERPL-SCNC: 26 MMOL/L (ref 20–28)
CREAT SERPL-MCNC: 1.32 MG/DL (ref 0.8–1.3)
DIFFERENTIAL METHOD BLD: ABNORMAL
EOSINOPHIL # BLD: 0.1 K/UL (ref 0–0.8)
EOSINOPHIL NFR BLD: 3 % (ref 0.5–7.8)
ERYTHROCYTE [DISTWIDTH] IN BLOOD BY AUTOMATED COUNT: 13.4 % (ref 11.9–14.6)
GLOBULIN SER CALC-MCNC: 2.8 G/DL (ref 2.3–3.5)
GLUCOSE SERPL-MCNC: 81 MG/DL (ref 70–99)
HCT VFR BLD AUTO: 47.6 % (ref 41.1–50.3)
HDLC SERPL-MCNC: 55 MG/DL (ref 40–60)
HDLC SERPL: 3.6 (ref 0–5)
HGB BLD-MCNC: 16.3 G/DL (ref 13.6–17.2)
IMM GRANULOCYTES # BLD AUTO: 0 K/UL (ref 0–0.5)
IMM GRANULOCYTES NFR BLD AUTO: 0 % (ref 0–5)
LDLC SERPL CALC-MCNC: 118 MG/DL (ref 0–100)
LYMPHOCYTES # BLD: 1.9 K/UL (ref 0.5–4.6)
LYMPHOCYTES NFR BLD: 38 % (ref 13–44)
MCH RBC QN AUTO: 30.6 PG (ref 26.1–32.9)
MCHC RBC AUTO-ENTMCNC: 34.2 G/DL (ref 31.4–35)
MCV RBC AUTO: 89.3 FL (ref 82–102)
MONOCYTES # BLD: 0.4 K/UL (ref 0.1–1.3)
MONOCYTES NFR BLD: 8 % (ref 4–12)
NEUTS SEG # BLD: 2.6 K/UL (ref 1.7–8.2)
NEUTS SEG NFR BLD: 50 % (ref 43–78)
NRBC # BLD: 0 K/UL (ref 0–0.2)
PLATELET # BLD AUTO: 117 K/UL (ref 150–450)
PMV BLD AUTO: 11.3 FL (ref 9.4–12.3)
POTASSIUM SERPL-SCNC: 4.2 MMOL/L (ref 3.5–5.1)
PROT SERPL-MCNC: 7.2 G/DL (ref 6.3–8.2)
RBC # BLD AUTO: 5.33 M/UL (ref 4.23–5.6)
SODIUM SERPL-SCNC: 141 MMOL/L (ref 136–145)
TRIGL SERPL-MCNC: 125 MG/DL (ref 0–150)
VLDLC SERPL CALC-MCNC: 25 MG/DL (ref 6–23)
WBC # BLD AUTO: 5.1 K/UL (ref 4.3–11.1)

## 2024-10-16 ENCOUNTER — TELEPHONE (OUTPATIENT)
Dept: INTERNAL MEDICINE CLINIC | Facility: CLINIC | Age: 44
End: 2024-10-16

## 2024-10-16 DIAGNOSIS — D69.6 THROMBOCYTOPENIA (HCC): Primary | ICD-10-CM

## 2024-10-16 NOTE — TELEPHONE ENCOUNTER
Patient with chronic downtrending thrombocytopenia.  Repeat CBC with peripheral smear ordered    Orders Placed This Encounter    CBC with Auto Differential     Standing Status:   Future     Standing Expiration Date:   10/16/2025    Path Review, Smear     Thrombocytopenia     Standing Status:   Future     Standing Expiration Date:   10/16/2025     Order Specific Question:   Reason for Review:     Answer:   Platelet

## 2024-10-20 NOTE — PROGRESS NOTES
Kevin Rausch (:  1980) is a 44 y.o. male,Established patient, here for evaluation of the following chief complaint(s):  Follow-up (Pt is here for a 5 month follow up. )         Assessment & Plan  Essential hypertension  Continue carvedilol, amlodipine, lisinopril/HCTZ     Orders:    carvedilol (COREG) 12.5 MG tablet; Take 1 tablet by mouth 2 times daily (with meals)    lisinopril-hydroCHLOROthiazide (PRINZIDE;ZESTORETIC) 20-25 MG per tablet; Take 1 tablet by mouth daily    amLODIPine (NORVASC) 10 MG tablet; Take 1 tablet by mouth daily    Chronic insomnia       Orders:    zolpidem (AMBIEN) 10 MG tablet; Take 1/2-1 whole tablet by mouth at bedtime as needed for sleep.  Max of 1 tablet per 24 hours.  Do not drink alcohol, drive or operate heavy machinery after use    Obstructive sleep apnea  Per patient obstructive sleep apnea borderline, not currently on CPAP  Currently on as needed Ambien.  Monitor carefully for worsening snoring or apneic episodes and if so consider repeat polysomnogram  Controlled substance agreement previously signed  PDMP reviewed showing zolpidem tartrate 10 mg tablets, quantity #30, 30-day supply last filled on 3/15/2024.  New prescription sent to pharmacy         Inflammatory arthropathy  Labs from 2023 notable for elevated anti-CCP antibody, elevated uric acid at 8.6; negative ANALI, HLA-B27, rheumatoid factor; normal sed rate, CRP   Prior therapies include diclofenac and meloxicam  Rheumatology notes suggest clinical picture consistent with seropositive RA.    Currently on methotrexate injection, folic acid, as needed prednisone per rheumatology         Anxiety and depression  Previously on Zoloft, since switched to Cymbalta by rheumatology given radicular symptoms  Tolerating with stable mood symptoms         Thrombocytopenia (HCC)  Per chart review chronic dating back to 2023  Possibly related to use of methotrexate  Normal white blood cell count,

## 2024-10-21 ENCOUNTER — OFFICE VISIT (OUTPATIENT)
Dept: INTERNAL MEDICINE CLINIC | Facility: CLINIC | Age: 44
End: 2024-10-21
Payer: COMMERCIAL

## 2024-10-21 VITALS
OXYGEN SATURATION: 97 % | BODY MASS INDEX: 38.9 KG/M2 | HEART RATE: 80 BPM | SYSTOLIC BLOOD PRESSURE: 140 MMHG | DIASTOLIC BLOOD PRESSURE: 88 MMHG | TEMPERATURE: 97.5 F | HEIGHT: 72 IN | WEIGHT: 287.2 LBS

## 2024-10-21 DIAGNOSIS — D69.6 THROMBOCYTOPENIA (HCC): ICD-10-CM

## 2024-10-21 DIAGNOSIS — I10 ESSENTIAL HYPERTENSION: Primary | ICD-10-CM

## 2024-10-21 DIAGNOSIS — M19.90 INFLAMMATORY ARTHROPATHY: ICD-10-CM

## 2024-10-21 DIAGNOSIS — F32.A ANXIETY AND DEPRESSION: ICD-10-CM

## 2024-10-21 DIAGNOSIS — G47.33 OBSTRUCTIVE SLEEP APNEA: ICD-10-CM

## 2024-10-21 DIAGNOSIS — F41.9 ANXIETY AND DEPRESSION: ICD-10-CM

## 2024-10-21 DIAGNOSIS — E78.5 DYSLIPIDEMIA: ICD-10-CM

## 2024-10-21 DIAGNOSIS — F51.04 CHRONIC INSOMNIA: ICD-10-CM

## 2024-10-21 DIAGNOSIS — M77.11 LATERAL EPICONDYLITIS OF RIGHT ELBOW: ICD-10-CM

## 2024-10-21 DIAGNOSIS — R30.0 DYSURIA: ICD-10-CM

## 2024-10-21 PROCEDURE — 99214 OFFICE O/P EST MOD 30 MIN: CPT | Performed by: STUDENT IN AN ORGANIZED HEALTH CARE EDUCATION/TRAINING PROGRAM

## 2024-10-21 PROCEDURE — 3077F SYST BP >= 140 MM HG: CPT | Performed by: STUDENT IN AN ORGANIZED HEALTH CARE EDUCATION/TRAINING PROGRAM

## 2024-10-21 PROCEDURE — 3079F DIAST BP 80-89 MM HG: CPT | Performed by: STUDENT IN AN ORGANIZED HEALTH CARE EDUCATION/TRAINING PROGRAM

## 2024-10-21 RX ORDER — LISINOPRIL AND HYDROCHLOROTHIAZIDE 20; 25 MG/1; MG/1
1 TABLET ORAL DAILY
Qty: 90 TABLET | Refills: 2 | Status: SHIPPED | OUTPATIENT
Start: 2024-10-21

## 2024-10-21 RX ORDER — AMLODIPINE BESYLATE 10 MG/1
10 TABLET ORAL DAILY
Qty: 90 TABLET | Refills: 2 | Status: SHIPPED | OUTPATIENT
Start: 2024-10-21

## 2024-10-21 RX ORDER — ZOLPIDEM TARTRATE 10 MG/1
TABLET ORAL
Qty: 30 TABLET | Refills: 2 | Status: SHIPPED | OUTPATIENT
Start: 2024-10-21 | End: 2025-04-21

## 2024-10-21 RX ORDER — MELOXICAM 15 MG/1
TABLET ORAL
Qty: 30 TABLET | Refills: 1 | Status: SHIPPED | OUTPATIENT
Start: 2024-10-21

## 2024-10-21 RX ORDER — ATORVASTATIN CALCIUM 80 MG/1
80 TABLET, FILM COATED ORAL NIGHTLY
Qty: 90 TABLET | Refills: 2 | Status: SHIPPED | OUTPATIENT
Start: 2024-10-21

## 2024-10-21 RX ORDER — CARVEDILOL 12.5 MG/1
12.5 TABLET ORAL 2 TIMES DAILY WITH MEALS
Qty: 180 TABLET | Refills: 2 | Status: SHIPPED | OUTPATIENT
Start: 2024-10-21

## 2024-10-21 ASSESSMENT — PATIENT HEALTH QUESTIONNAIRE - PHQ9
3. TROUBLE FALLING OR STAYING ASLEEP: MORE THAN HALF THE DAYS
SUM OF ALL RESPONSES TO PHQ QUESTIONS 1-9: 5
8. MOVING OR SPEAKING SO SLOWLY THAT OTHER PEOPLE COULD HAVE NOTICED. OR THE OPPOSITE, BEING SO FIGETY OR RESTLESS THAT YOU HAVE BEEN MOVING AROUND A LOT MORE THAN USUAL: NOT AT ALL
6. FEELING BAD ABOUT YOURSELF - OR THAT YOU ARE A FAILURE OR HAVE LET YOURSELF OR YOUR FAMILY DOWN: NOT AT ALL
1. LITTLE INTEREST OR PLEASURE IN DOING THINGS: NOT AT ALL
SUM OF ALL RESPONSES TO PHQ9 QUESTIONS 1 & 2: 0
7. TROUBLE CONCENTRATING ON THINGS, SUCH AS READING THE NEWSPAPER OR WATCHING TELEVISION: MORE THAN HALF THE DAYS
5. POOR APPETITE OR OVEREATING: NOT AT ALL
SUM OF ALL RESPONSES TO PHQ QUESTIONS 1-9: 5
10. IF YOU CHECKED OFF ANY PROBLEMS, HOW DIFFICULT HAVE THESE PROBLEMS MADE IT FOR YOU TO DO YOUR WORK, TAKE CARE OF THINGS AT HOME, OR GET ALONG WITH OTHER PEOPLE: NOT DIFFICULT AT ALL
2. FEELING DOWN, DEPRESSED OR HOPELESS: NOT AT ALL
SUM OF ALL RESPONSES TO PHQ QUESTIONS 1-9: 5
4. FEELING TIRED OR HAVING LITTLE ENERGY: SEVERAL DAYS
SUM OF ALL RESPONSES TO PHQ QUESTIONS 1-9: 5
9. THOUGHTS THAT YOU WOULD BE BETTER OFF DEAD, OR OF HURTING YOURSELF: NOT AT ALL

## 2024-10-21 ASSESSMENT — ANXIETY QUESTIONNAIRES
IF YOU CHECKED OFF ANY PROBLEMS ON THIS QUESTIONNAIRE, HOW DIFFICULT HAVE THESE PROBLEMS MADE IT FOR YOU TO DO YOUR WORK, TAKE CARE OF THINGS AT HOME, OR GET ALONG WITH OTHER PEOPLE: NOT DIFFICULT AT ALL
3. WORRYING TOO MUCH ABOUT DIFFERENT THINGS: NOT AT ALL
1. FEELING NERVOUS, ANXIOUS, OR ON EDGE: NOT AT ALL
2. NOT BEING ABLE TO STOP OR CONTROL WORRYING: NOT AT ALL
GAD7 TOTAL SCORE: 2
4. TROUBLE RELAXING: NOT AT ALL
6. BECOMING EASILY ANNOYED OR IRRITABLE: SEVERAL DAYS
7. FEELING AFRAID AS IF SOMETHING AWFUL MIGHT HAPPEN: NOT AT ALL
5. BEING SO RESTLESS THAT IT IS HARD TO SIT STILL: SEVERAL DAYS

## 2024-10-21 ASSESSMENT — ENCOUNTER SYMPTOMS
BACK PAIN: 0
APNEA: 0

## 2024-11-05 ENCOUNTER — LAB (OUTPATIENT)
Dept: INTERNAL MEDICINE CLINIC | Facility: CLINIC | Age: 44
End: 2024-11-05

## 2024-11-05 DIAGNOSIS — D69.6 THROMBOCYTOPENIA (HCC): ICD-10-CM

## 2024-11-05 DIAGNOSIS — R30.0 DYSURIA: ICD-10-CM

## 2024-11-05 LAB
APPEARANCE UR: ABNORMAL
BACTERIA URNS QL MICRO: NEGATIVE /HPF
BASOPHILS # BLD: 0 K/UL (ref 0–0.2)
BASOPHILS NFR BLD: 1 % (ref 0–2)
BILIRUB UR QL: NEGATIVE
COLOR UR: ABNORMAL
DIFFERENTIAL METHOD BLD: ABNORMAL
EOSINOPHIL # BLD: 0.1 K/UL (ref 0–0.8)
EOSINOPHIL NFR BLD: 2 % (ref 0.5–7.8)
EPI CELLS #/AREA URNS HPF: ABNORMAL /HPF (ref 0–5)
ERYTHROCYTE [DISTWIDTH] IN BLOOD BY AUTOMATED COUNT: 13.2 % (ref 11.9–14.6)
GLUCOSE UR STRIP.AUTO-MCNC: NEGATIVE MG/DL
HCT VFR BLD AUTO: 43.5 % (ref 41.1–50.3)
HGB BLD-MCNC: 14.8 G/DL (ref 13.6–17.2)
HGB UR QL STRIP: NEGATIVE
HYALINE CASTS URNS QL MICRO: ABNORMAL /LPF
IMM GRANULOCYTES # BLD AUTO: 0 K/UL (ref 0–0.5)
IMM GRANULOCYTES NFR BLD AUTO: 1 % (ref 0–5)
KETONES UR QL STRIP.AUTO: ABNORMAL MG/DL
LEUKOCYTE ESTERASE UR QL STRIP.AUTO: NEGATIVE
LYMPHOCYTES # BLD: 1.7 K/UL (ref 0.5–4.6)
LYMPHOCYTES NFR BLD: 35 % (ref 13–44)
MCH RBC QN AUTO: 30.6 PG (ref 26.1–32.9)
MCHC RBC AUTO-ENTMCNC: 34 G/DL (ref 31.4–35)
MCV RBC AUTO: 90.1 FL (ref 82–102)
MONOCYTES # BLD: 0.4 K/UL (ref 0.1–1.3)
MONOCYTES NFR BLD: 7 % (ref 4–12)
NEUTS SEG # BLD: 2.8 K/UL (ref 1.7–8.2)
NEUTS SEG NFR BLD: 55 % (ref 43–78)
NITRITE UR QL STRIP.AUTO: NEGATIVE
NRBC # BLD: 0 K/UL (ref 0–0.2)
PH UR STRIP: 7.5 (ref 5–9)
PLATELET # BLD AUTO: 128 K/UL (ref 150–450)
PMV BLD AUTO: 11.4 FL (ref 9.4–12.3)
PROT UR STRIP-MCNC: ABNORMAL MG/DL
RBC # BLD AUTO: 4.83 M/UL (ref 4.23–5.6)
RBC #/AREA URNS HPF: ABNORMAL /HPF (ref 0–5)
SP GR UR REFRACTOMETRY: 1.03 (ref 1–1.02)
UROBILINOGEN UR QL STRIP.AUTO: 0.2 EU/DL (ref 0.2–1)
WBC # BLD AUTO: 5 K/UL (ref 4.3–11.1)
WBC URNS QL MICRO: ABNORMAL /HPF (ref 0–4)

## 2024-11-06 LAB — PATH REV BLD -IMP: NORMAL

## 2024-11-08 ENCOUNTER — TELEPHONE (OUTPATIENT)
Dept: INTERNAL MEDICINE CLINIC | Facility: CLINIC | Age: 44
End: 2024-11-08

## 2024-11-08 DIAGNOSIS — R80.9 PROTEINURIA, UNSPECIFIED TYPE: Primary | ICD-10-CM

## 2024-11-08 NOTE — TELEPHONE ENCOUNTER
Recent urinalysis showed trace amounts of protein but no blood or signs of infection.  Repeat urinalysis and urine microalbumin to creatinine ratio ordered to better evaluate    Orders Placed This Encounter    Urinalysis     Standing Status:   Future     Standing Expiration Date:   11/8/2025    Microalbumin / Creatinine Urine Ratio     Standing Status:   Future     Standing Expiration Date:   11/8/2025

## 2024-12-01 DIAGNOSIS — M77.11 LATERAL EPICONDYLITIS OF RIGHT ELBOW: ICD-10-CM

## 2024-12-02 RX ORDER — MELOXICAM 15 MG/1
TABLET ORAL
Qty: 30 TABLET | Refills: 1 | Status: SHIPPED | OUTPATIENT
Start: 2024-12-02

## 2024-12-28 DIAGNOSIS — M19.90 INFLAMMATORY ARTHROPATHY: ICD-10-CM

## 2024-12-28 DIAGNOSIS — F41.9 ANXIETY AND DEPRESSION: ICD-10-CM

## 2024-12-28 DIAGNOSIS — F32.A ANXIETY AND DEPRESSION: ICD-10-CM

## 2024-12-29 RX ORDER — DULOXETIN HYDROCHLORIDE 60 MG/1
60 CAPSULE, DELAYED RELEASE ORAL DAILY
Qty: 90 CAPSULE | Refills: 1 | OUTPATIENT
Start: 2024-12-29

## 2025-02-19 NOTE — PROGRESS NOTES
Kevin Rausch (:  1980) is a 44 y.o. male,Established patient, here for evaluation of the following chief complaint(s):  Follow-up (Pt is here for a routine 4 month follow up. ) and Other (Pt reports bilateral hip pain and R knee pain for months. Pt denies injury associated. Pt reports slight swelling in R knee as well. Pt states the pain has awoken him from sleep. )         Assessment & Plan  Essential hypertension  Continue carvedilol, amlodipine, lisinopril/HCTZ   Patient needs to acquire a new blood pressure cuff.  He and I discussed he will likely need to get a wrist cuff given his large diameter upper arm which will make finding a automatic arm cuff difficult  Monitor blood pressure for 2 weeks until he returns for nursing visit for BP check    Orders:    amLODIPine (NORVASC) 10 MG tablet; Take 1 tablet by mouth daily    carvedilol (COREG) 12.5 MG tablet; Take 1 tablet by mouth 2 times daily (with meals)    lisinopril-hydroCHLOROthiazide (PRINZIDE;ZESTORETIC) 20-25 MG per tablet; Take 1 tablet by mouth daily    Dyslipidemia  Lipid panel from 10/15/2024 with LDL elevated at 118  Continue atorvastatin and lifestyle changes    Orders:    atorvastatin (LIPITOR) 80 MG tablet; Take 1 tablet by mouth at bedtime    Chronic insomnia            Obstructive sleep apnea  Per patient obstructive sleep apnea borderline, not currently on CPAP  Currently on as needed Ambien.  Monitor carefully for worsening snoring or apneic episodes and if so consider repeat polysomnogram  Controlled substance agreement previously signed         Inflammatory polyarthropathy (HCC)  Labs from 2023 notable for elevated anti-CCP antibody, elevated uric acid at 8.6; negative ANALI, HLA-B27, rheumatoid factor; normal sed rate, CRP   Prior therapies include diclofenac and meloxicam  Rheumatology notes suggest clinical picture consistent with seropositive RA.    Previously on methotrexate and folic acid but has been off of it

## 2025-02-23 SDOH — ECONOMIC STABILITY: TRANSPORTATION INSECURITY
IN THE PAST 12 MONTHS, HAS THE LACK OF TRANSPORTATION KEPT YOU FROM MEDICAL APPOINTMENTS OR FROM GETTING MEDICATIONS?: PATIENT DECLINED

## 2025-02-23 SDOH — ECONOMIC STABILITY: TRANSPORTATION INSECURITY
IN THE PAST 12 MONTHS, HAS LACK OF TRANSPORTATION KEPT YOU FROM MEETINGS, WORK, OR FROM GETTING THINGS NEEDED FOR DAILY LIVING?: PATIENT DECLINED

## 2025-02-23 SDOH — ECONOMIC STABILITY: INCOME INSECURITY: IN THE LAST 12 MONTHS, WAS THERE A TIME WHEN YOU WERE NOT ABLE TO PAY THE MORTGAGE OR RENT ON TIME?: PATIENT DECLINED

## 2025-02-23 SDOH — ECONOMIC STABILITY: FOOD INSECURITY: WITHIN THE PAST 12 MONTHS, THE FOOD YOU BOUGHT JUST DIDN'T LAST AND YOU DIDN'T HAVE MONEY TO GET MORE.: PATIENT DECLINED

## 2025-02-23 SDOH — ECONOMIC STABILITY: FOOD INSECURITY: WITHIN THE PAST 12 MONTHS, YOU WORRIED THAT YOUR FOOD WOULD RUN OUT BEFORE YOU GOT MONEY TO BUY MORE.: PATIENT DECLINED

## 2025-02-23 ASSESSMENT — PATIENT HEALTH QUESTIONNAIRE - PHQ9
2. FEELING DOWN, DEPRESSED OR HOPELESS: NOT AT ALL
9. THOUGHTS THAT YOU WOULD BE BETTER OFF DEAD, OR OF HURTING YOURSELF: NOT AT ALL
SUM OF ALL RESPONSES TO PHQ QUESTIONS 1-9: 3
10. IF YOU CHECKED OFF ANY PROBLEMS, HOW DIFFICULT HAVE THESE PROBLEMS MADE IT FOR YOU TO DO YOUR WORK, TAKE CARE OF THINGS AT HOME, OR GET ALONG WITH OTHER PEOPLE: NOT DIFFICULT AT ALL
5. POOR APPETITE OR OVEREATING: NOT AT ALL
8. MOVING OR SPEAKING SO SLOWLY THAT OTHER PEOPLE COULD HAVE NOTICED. OR THE OPPOSITE - BEING SO FIDGETY OR RESTLESS THAT YOU HAVE BEEN MOVING AROUND A LOT MORE THAN USUAL: NOT AT ALL
4. FEELING TIRED OR HAVING LITTLE ENERGY: SEVERAL DAYS
7. TROUBLE CONCENTRATING ON THINGS, SUCH AS READING THE NEWSPAPER OR WATCHING TELEVISION: NOT AT ALL
5. POOR APPETITE OR OVEREATING: NOT AT ALL
3. TROUBLE FALLING OR STAYING ASLEEP: MORE THAN HALF THE DAYS
6. FEELING BAD ABOUT YOURSELF - OR THAT YOU ARE A FAILURE OR HAVE LET YOURSELF OR YOUR FAMILY DOWN: NOT AT ALL
SUM OF ALL RESPONSES TO PHQ9 QUESTIONS 1 & 2: 0
SUM OF ALL RESPONSES TO PHQ QUESTIONS 1-9: 3
2. FEELING DOWN, DEPRESSED OR HOPELESS: NOT AT ALL
SUM OF ALL RESPONSES TO PHQ QUESTIONS 1-9: 3
4. FEELING TIRED OR HAVING LITTLE ENERGY: SEVERAL DAYS
1. LITTLE INTEREST OR PLEASURE IN DOING THINGS: NOT AT ALL
3. TROUBLE FALLING OR STAYING ASLEEP: MORE THAN HALF THE DAYS
9. THOUGHTS THAT YOU WOULD BE BETTER OFF DEAD, OR OF HURTING YOURSELF: NOT AT ALL
7. TROUBLE CONCENTRATING ON THINGS, SUCH AS READING THE NEWSPAPER OR WATCHING TELEVISION: NOT AT ALL
SUM OF ALL RESPONSES TO PHQ QUESTIONS 1-9: 3
6. FEELING BAD ABOUT YOURSELF - OR THAT YOU ARE A FAILURE OR HAVE LET YOURSELF OR YOUR FAMILY DOWN: NOT AT ALL
10. IF YOU CHECKED OFF ANY PROBLEMS, HOW DIFFICULT HAVE THESE PROBLEMS MADE IT FOR YOU TO DO YOUR WORK, TAKE CARE OF THINGS AT HOME, OR GET ALONG WITH OTHER PEOPLE: NOT DIFFICULT AT ALL
1. LITTLE INTEREST OR PLEASURE IN DOING THINGS: NOT AT ALL
SUM OF ALL RESPONSES TO PHQ QUESTIONS 1-9: 3
8. MOVING OR SPEAKING SO SLOWLY THAT OTHER PEOPLE COULD HAVE NOTICED. OR THE OPPOSITE, BEING SO FIGETY OR RESTLESS THAT YOU HAVE BEEN MOVING AROUND A LOT MORE THAN USUAL: NOT AT ALL

## 2025-02-25 ENCOUNTER — OFFICE VISIT (OUTPATIENT)
Dept: INTERNAL MEDICINE CLINIC | Facility: CLINIC | Age: 45
End: 2025-02-25
Payer: COMMERCIAL

## 2025-02-25 VITALS
WEIGHT: 288 LBS | HEIGHT: 72 IN | OXYGEN SATURATION: 99 % | BODY MASS INDEX: 39.01 KG/M2 | TEMPERATURE: 97.7 F | HEART RATE: 77 BPM | SYSTOLIC BLOOD PRESSURE: 140 MMHG | DIASTOLIC BLOOD PRESSURE: 90 MMHG

## 2025-02-25 DIAGNOSIS — E78.5 DYSLIPIDEMIA: ICD-10-CM

## 2025-02-25 DIAGNOSIS — F51.04 CHRONIC INSOMNIA: ICD-10-CM

## 2025-02-25 DIAGNOSIS — F32.A ANXIETY AND DEPRESSION: ICD-10-CM

## 2025-02-25 DIAGNOSIS — M06.4 INFLAMMATORY POLYARTHROPATHY (HCC): ICD-10-CM

## 2025-02-25 DIAGNOSIS — F41.9 ANXIETY AND DEPRESSION: ICD-10-CM

## 2025-02-25 DIAGNOSIS — G47.33 OBSTRUCTIVE SLEEP APNEA: ICD-10-CM

## 2025-02-25 DIAGNOSIS — I10 ESSENTIAL HYPERTENSION: Primary | ICD-10-CM

## 2025-02-25 DIAGNOSIS — D69.6 THROMBOCYTOPENIA: ICD-10-CM

## 2025-02-25 PROCEDURE — 3075F SYST BP GE 130 - 139MM HG: CPT | Performed by: STUDENT IN AN ORGANIZED HEALTH CARE EDUCATION/TRAINING PROGRAM

## 2025-02-25 PROCEDURE — 3080F DIAST BP >= 90 MM HG: CPT | Performed by: STUDENT IN AN ORGANIZED HEALTH CARE EDUCATION/TRAINING PROGRAM

## 2025-02-25 PROCEDURE — 99214 OFFICE O/P EST MOD 30 MIN: CPT | Performed by: STUDENT IN AN ORGANIZED HEALTH CARE EDUCATION/TRAINING PROGRAM

## 2025-02-25 RX ORDER — AMLODIPINE BESYLATE 10 MG/1
10 TABLET ORAL DAILY
Qty: 90 TABLET | Refills: 2 | Status: SHIPPED | OUTPATIENT
Start: 2025-02-25

## 2025-02-25 RX ORDER — CARVEDILOL 12.5 MG/1
12.5 TABLET ORAL 2 TIMES DAILY WITH MEALS
Qty: 180 TABLET | Refills: 2 | Status: SHIPPED | OUTPATIENT
Start: 2025-02-25

## 2025-02-25 RX ORDER — PREDNISONE 10 MG/1
TABLET ORAL
Qty: 20 TABLET | Refills: 0 | Status: SHIPPED | OUTPATIENT
Start: 2025-02-25

## 2025-02-25 RX ORDER — MELOXICAM 15 MG/1
TABLET ORAL
Qty: 30 TABLET | Refills: 1 | Status: CANCELLED | OUTPATIENT
Start: 2025-02-25

## 2025-02-25 RX ORDER — LISINOPRIL AND HYDROCHLOROTHIAZIDE 20; 25 MG/1; MG/1
1 TABLET ORAL DAILY
Qty: 90 TABLET | Refills: 2 | Status: SHIPPED | OUTPATIENT
Start: 2025-02-25

## 2025-02-25 RX ORDER — ATORVASTATIN CALCIUM 80 MG/1
80 TABLET, FILM COATED ORAL NIGHTLY
Qty: 90 TABLET | Refills: 2 | Status: SHIPPED | OUTPATIENT
Start: 2025-02-25

## 2025-02-25 ASSESSMENT — ENCOUNTER SYMPTOMS
SHORTNESS OF BREATH: 0
ANAL BLEEDING: 0
CONSTIPATION: 0
DIARRHEA: 0
ABDOMINAL PAIN: 0
BLOOD IN STOOL: 0

## 2025-04-07 DIAGNOSIS — G62.9 NEUROPATHY: ICD-10-CM

## 2025-04-07 DIAGNOSIS — M06.4 INFLAMMATORY POLYARTHRITIS (HCC): ICD-10-CM

## 2025-04-07 DIAGNOSIS — Z79.52 LONG TERM (CURRENT) USE OF SYSTEMIC STEROIDS: ICD-10-CM

## 2025-04-07 DIAGNOSIS — M54.50 LOW BACK PAIN, UNSPECIFIED BACK PAIN LATERALITY, UNSPECIFIED CHRONICITY, UNSPECIFIED WHETHER SCIATICA PRESENT: ICD-10-CM

## 2025-04-07 DIAGNOSIS — R89.4 SEROLOGIC ABNORMALITY: Primary | ICD-10-CM

## 2025-04-07 DIAGNOSIS — E55.9 HYPOVITAMINOSIS D: ICD-10-CM

## 2025-04-07 DIAGNOSIS — R53.83 OTHER FATIGUE: ICD-10-CM

## 2025-04-07 DIAGNOSIS — M15.9 GENERALIZED OSTEOARTHRITIS: ICD-10-CM

## 2025-04-07 DIAGNOSIS — Z79.899 HIGH RISK MEDICATION USE: ICD-10-CM

## 2025-04-07 DIAGNOSIS — M05.9 SEROPOSITIVE RHEUMATOID ARTHRITIS (HCC): ICD-10-CM

## 2025-04-17 DIAGNOSIS — M05.9 SEROPOSITIVE RHEUMATOID ARTHRITIS (HCC): ICD-10-CM

## 2025-04-17 DIAGNOSIS — Z79.52 LONG TERM (CURRENT) USE OF SYSTEMIC STEROIDS: ICD-10-CM

## 2025-04-17 DIAGNOSIS — R89.4 SEROLOGIC ABNORMALITY: ICD-10-CM

## 2025-04-17 DIAGNOSIS — M06.4 INFLAMMATORY POLYARTHRITIS (HCC): ICD-10-CM

## 2025-04-17 DIAGNOSIS — Z79.899 HIGH RISK MEDICATION USE: ICD-10-CM

## 2025-04-17 DIAGNOSIS — G62.9 NEUROPATHY: ICD-10-CM

## 2025-04-17 DIAGNOSIS — R53.83 OTHER FATIGUE: ICD-10-CM

## 2025-04-17 DIAGNOSIS — E55.9 HYPOVITAMINOSIS D: ICD-10-CM

## 2025-04-17 DIAGNOSIS — M54.50 LOW BACK PAIN, UNSPECIFIED BACK PAIN LATERALITY, UNSPECIFIED CHRONICITY, UNSPECIFIED WHETHER SCIATICA PRESENT: ICD-10-CM

## 2025-04-17 DIAGNOSIS — M15.9 GENERALIZED OSTEOARTHRITIS: ICD-10-CM

## 2025-04-17 LAB
25(OH)D3 SERPL-MCNC: 44.5 NG/ML (ref 30–100)
ALBUMIN SERPL-MCNC: 3.9 G/DL (ref 3.5–5)
ALBUMIN/GLOB SERPL: 1.5 (ref 1–1.9)
ALP SERPL-CCNC: 67 U/L (ref 40–129)
ALT SERPL-CCNC: 29 U/L (ref 8–55)
ANION GAP SERPL CALC-SCNC: 12 MMOL/L (ref 7–16)
AST SERPL-CCNC: 34 U/L (ref 15–37)
BILIRUB SERPL-MCNC: 0.4 MG/DL (ref 0–1.2)
BUN SERPL-MCNC: 27 MG/DL (ref 6–23)
CALCIUM SERPL-MCNC: 9.9 MG/DL (ref 8.8–10.2)
CHLORIDE SERPL-SCNC: 103 MMOL/L (ref 98–107)
CO2 SERPL-SCNC: 26 MMOL/L (ref 20–29)
CREAT SERPL-MCNC: 1.47 MG/DL (ref 0.8–1.3)
ERYTHROCYTE [DISTWIDTH] IN BLOOD BY AUTOMATED COUNT: 13 % (ref 11.9–14.6)
ERYTHROCYTE [SEDIMENTATION RATE] IN BLOOD: 4 MM/HR (ref 0–20)
GLOBULIN SER CALC-MCNC: 2.7 G/DL (ref 2.3–3.5)
GLUCOSE SERPL-MCNC: 95 MG/DL (ref 70–99)
HAV IGM SER QL: NONREACTIVE
HBV CORE IGM SER QL: NONREACTIVE
HBV SURFACE AG SER QL: NONREACTIVE
HCT VFR BLD AUTO: 42.5 % (ref 41.1–50.3)
HCV AB SER QL: NONREACTIVE
HGB BLD-MCNC: 15.2 G/DL (ref 13.6–17.2)
MCH RBC QN AUTO: 30.8 PG (ref 26.1–32.9)
MCHC RBC AUTO-ENTMCNC: 35.8 G/DL (ref 31.4–35)
MCV RBC AUTO: 86.2 FL (ref 82–102)
NRBC # BLD: 0 K/UL (ref 0–0.2)
PLATELET # BLD AUTO: 126 K/UL (ref 150–450)
PMV BLD AUTO: 12.5 FL (ref 9.4–12.3)
POTASSIUM SERPL-SCNC: 3.6 MMOL/L (ref 3.5–5.1)
PROT SERPL-MCNC: 6.6 G/DL (ref 6.3–8.2)
RBC # BLD AUTO: 4.93 M/UL (ref 4.23–5.6)
SODIUM SERPL-SCNC: 141 MMOL/L (ref 136–145)
URATE SERPL-MCNC: 8.1 MG/DL (ref 3.9–8.2)
WBC # BLD AUTO: 6.7 K/UL (ref 4.3–11.1)

## 2025-04-23 NOTE — PROGRESS NOTES
Kevin Rausch is a 44 y.o. male who was seen by synchronous (real-time) audio-video technology.    Consent:  He and/or his healthcare decision maker is aware that this patient-initiated Telehealth encounter is a billable service, with coverage as determined by his insurance carrier. He is aware that he may receive a bill and has provided verbal consent to proceed: Yes             Subjective:      HPI:      Permanent history  Mr. Rausch is a very pleasant 44-year-old -American gentleman with past medical history significant for hypertension, hyperlipidemia, arthritis who presents for evaluation.    Patient was referred by his orthopedist for advanced and extensive arthritis/synovitis/tenosynovitis which was worked up and showed elevated anti-CCP.    He reports severe pain in his left elbow and losing . He has a history of lower back, right elbow, and shoulder issues. The patient started noticing symptoms in his early 30s, approximately 10 years ago, including stiffness in his neck, tight shoulder muscles, and aching in his shoulders and elbows. He also reports shoulder impingement and the sensation of tendons jumping over something or moving around.    Mr. Rausch experiences heat in his back, shoulders, and elbows at times, but denies joint swelling. He describes his joints as feeling tight and reports that his symptoms are worse in the morning, improving as he moves throughout the day. However, he stiffens up again towards the end of the day or after sitting for extended periods.    The patient has a family history of arthritis in his mother, but no known family history of lupus, rheumatoid arthritis, or thyroid disease. He works as an  and has a history of more physically demanding jobs. He smoked cigarettes until his early 30s and currently consumes a couple of cigars a week and about six alcoholic beverages a week. He has no history of gout or psoriasis, but his daughter had

## 2025-04-24 ENCOUNTER — TELEMEDICINE (OUTPATIENT)
Dept: RHEUMATOLOGY | Age: 45
End: 2025-04-24
Payer: COMMERCIAL

## 2025-04-24 DIAGNOSIS — Z79.52 LONG TERM (CURRENT) USE OF SYSTEMIC STEROIDS: ICD-10-CM

## 2025-04-24 DIAGNOSIS — R89.4 SEROLOGIC ABNORMALITY: ICD-10-CM

## 2025-04-24 DIAGNOSIS — Z79.899 HIGH RISK MEDICATION USE: ICD-10-CM

## 2025-04-24 DIAGNOSIS — Z79.60 LONG TERM CURRENT USE OF IMMUNOSUPPRESSIVE DRUG: ICD-10-CM

## 2025-04-24 DIAGNOSIS — E55.9 HYPOVITAMINOSIS D: ICD-10-CM

## 2025-04-24 DIAGNOSIS — M15.9 GENERALIZED OSTEOARTHRITIS: ICD-10-CM

## 2025-04-24 DIAGNOSIS — G62.9 NEUROPATHY: ICD-10-CM

## 2025-04-24 DIAGNOSIS — Z91.199 COMPLIANCE POOR: ICD-10-CM

## 2025-04-24 DIAGNOSIS — R53.83 OTHER FATIGUE: ICD-10-CM

## 2025-04-24 DIAGNOSIS — M05.9 SEROPOSITIVE RHEUMATOID ARTHRITIS (HCC): Primary | ICD-10-CM

## 2025-04-24 DIAGNOSIS — M54.50 LOW BACK PAIN, UNSPECIFIED BACK PAIN LATERALITY, UNSPECIFIED CHRONICITY, UNSPECIFIED WHETHER SCIATICA PRESENT: ICD-10-CM

## 2025-04-24 PROCEDURE — 99215 OFFICE O/P EST HI 40 MIN: CPT | Performed by: INTERNAL MEDICINE

## 2025-04-24 RX ORDER — FOLIC ACID 1 MG/1
1 TABLET ORAL DAILY
Qty: 90 TABLET | Refills: 0 | Status: SHIPPED | OUTPATIENT
Start: 2025-04-24

## 2025-04-24 RX ORDER — PREDNISONE 5 MG/1
TABLET ORAL
Qty: 30 TABLET | Refills: 0 | Status: SHIPPED | OUTPATIENT
Start: 2025-04-24

## 2025-04-24 RX ORDER — SYRINGE AND NEEDLE,INSULIN,1ML 28GX1/2"
SYRINGE, EMPTY DISPOSABLE MISCELLANEOUS
Qty: 100 EACH | Refills: 0 | Status: SHIPPED | OUTPATIENT
Start: 2025-04-24

## 2025-04-24 RX ORDER — METHOTREXATE 25 MG/ML
INJECTION, SOLUTION INTRAMUSCULAR; INTRATHECAL; INTRAVENOUS; SUBCUTANEOUS
Qty: 8 ML | Refills: 0 | Status: SHIPPED | OUTPATIENT
Start: 2025-04-24

## 2025-04-24 RX ORDER — GABAPENTIN 300 MG/1
CAPSULE ORAL
Qty: 90 CAPSULE | Refills: 0 | Status: SHIPPED | OUTPATIENT
Start: 2025-04-24 | End: 2026-04-22

## 2025-04-24 RX ORDER — DULOXETIN HYDROCHLORIDE 30 MG/1
CAPSULE, DELAYED RELEASE ORAL
Qty: 90 CAPSULE | Refills: 0 | Status: SHIPPED | OUTPATIENT
Start: 2025-04-24

## 2025-04-24 NOTE — PATIENT INSTRUCTIONS
Labs -CBC, CMP, sed rate before next visit  Resume prednisone 15 mg daily for 5 days then 10 mg daily for 5 days then 5 mg daily for 5 days then stop    Resume methotrexate at lower dose of 15 mg subcu once weekly daily folic acid 1 mg  Vitamin D 2000 units daily   Resume gabapentin 300 mg daily in the evening at 7 PM  Resume Cymbalta 30 mg daily  Return to clinic in 5 to 6 weeks with labs- Call if any issues    6WK,Labs BS prior

## 2025-06-10 ENCOUNTER — TELEPHONE (OUTPATIENT)
Dept: RHEUMATOLOGY | Age: 45
End: 2025-06-10

## 2025-06-10 NOTE — TELEPHONE ENCOUNTER
VV 4/24,  Appt7/10,  Pt states he is taking meds as prescribed and he he had to move out appt due to starting a new job, see pt message below,thanks    Per pt, My hips and lower back and knees have been bothering me lately. The pain in my hips often wakes me up at night and especially last night. I have taken Tylenol but it’s just really nagging right now.       Plan:         Labs -CBC, CMP, sed rate before next visit  Resume prednisone 15 mg daily for 5 days then 10 mg daily for 5 days then 5 mg daily for 5 days then stop    Resume methotrexate at lower dose of 15 mg subcu once weekly daily folic acid 1 mg  Vitamin D 2000 units daily   Resume gabapentin 300 mg daily in the evening at 7 PM  Resume Cymbalta 30 mg daily  Return to clinic in 5 to 6 weeks with labs- Call if any issues

## 2025-06-18 NOTE — TELEPHONE ENCOUNTER
Replied thru My Chart,  Hi, per Dr Madison, she doesn't want to add any meds currently or change dosing, she rich it will take some time since just resuming your medications, she said you can do another prednisone taper as same as after the last visit if you would like -  prednisone 15 mg daily for 5 days then 10 mg daily for 5 days then 5 mg daily for 5 days then stop, if you need an Rx sent in, let us know and please keep your follow up appt, thanks

## 2025-06-23 ENCOUNTER — APPOINTMENT (OUTPATIENT)
Dept: ULTRASOUND IMAGING | Age: 45
End: 2025-06-23
Payer: COMMERCIAL

## 2025-06-23 ENCOUNTER — PATIENT MESSAGE (OUTPATIENT)
Dept: INTERNAL MEDICINE CLINIC | Facility: CLINIC | Age: 45
End: 2025-06-23

## 2025-06-23 ENCOUNTER — HOSPITAL ENCOUNTER (EMERGENCY)
Age: 45
Discharge: HOME | End: 2025-06-23
Attending: EMERGENCY MEDICINE
Payer: COMMERCIAL

## 2025-06-23 VITALS
RESPIRATION RATE: 16 BRPM | TEMPERATURE: 97.7 F | HEIGHT: 72 IN | BODY MASS INDEX: 37.93 KG/M2 | DIASTOLIC BLOOD PRESSURE: 94 MMHG | OXYGEN SATURATION: 100 % | HEART RATE: 64 BPM | WEIGHT: 280 LBS | SYSTOLIC BLOOD PRESSURE: 132 MMHG

## 2025-06-23 DIAGNOSIS — R10.32 LEFT INGUINAL PAIN: Primary | ICD-10-CM

## 2025-06-23 DIAGNOSIS — N50.82 SCROTAL PAIN: ICD-10-CM

## 2025-06-23 LAB
ALBUMIN SERPL-MCNC: 4.3 G/DL (ref 3.5–5)
ALBUMIN/GLOB SERPL: 1.3 (ref 1–1.9)
ALP SERPL-CCNC: 73 U/L (ref 40–129)
ALT SERPL-CCNC: 26 U/L (ref 8–55)
ANION GAP SERPL CALC-SCNC: 14 MMOL/L (ref 7–16)
AST SERPL-CCNC: 42 U/L (ref 15–37)
BASOPHILS # BLD: 0.04 K/UL (ref 0–0.2)
BASOPHILS NFR BLD: 0.6 % (ref 0–2)
BILIRUB SERPL-MCNC: 0.7 MG/DL (ref 0–1.2)
BILIRUB UR QL: NEGATIVE
BUN SERPL-MCNC: 20 MG/DL (ref 6–23)
CALCIUM SERPL-MCNC: 10 MG/DL (ref 8.8–10.2)
CHLORIDE SERPL-SCNC: 99 MMOL/L (ref 98–107)
CO2 SERPL-SCNC: 25 MMOL/L (ref 20–29)
CREAT SERPL-MCNC: 1.68 MG/DL (ref 0.8–1.3)
DIFFERENTIAL METHOD BLD: ABNORMAL
EOSINOPHIL # BLD: 0.17 K/UL (ref 0–0.8)
EOSINOPHIL NFR BLD: 2.4 % (ref 0.5–7.8)
ERYTHROCYTE [DISTWIDTH] IN BLOOD BY AUTOMATED COUNT: 14.4 % (ref 11.9–14.6)
GLOBULIN SER CALC-MCNC: 3.3 G/DL (ref 2.3–3.5)
GLUCOSE SERPL-MCNC: 88 MG/DL (ref 70–99)
GLUCOSE UR QL STRIP.AUTO: NEGATIVE MG/DL
HCT VFR BLD AUTO: 43.8 % (ref 41.1–50.3)
HGB BLD-MCNC: 15.6 G/DL (ref 13.6–17.2)
IMM GRANULOCYTES # BLD AUTO: 0.04 K/UL (ref 0–0.5)
IMM GRANULOCYTES NFR BLD AUTO: 0.6 % (ref 0–5)
KETONES UR-MCNC: NEGATIVE MG/DL
LEUKOCYTE ESTERASE UR QL STRIP: NEGATIVE
LYMPHOCYTES # BLD: 1.77 K/UL (ref 0.5–4.6)
LYMPHOCYTES NFR BLD: 24.9 % (ref 13–44)
MCH RBC QN AUTO: 30.9 PG (ref 26.1–32.9)
MCHC RBC AUTO-ENTMCNC: 35.6 G/DL (ref 31.4–35)
MCV RBC AUTO: 86.7 FL (ref 82–102)
MONOCYTES # BLD: 0.55 K/UL (ref 0.1–1.3)
MONOCYTES NFR BLD: 7.7 % (ref 4–12)
NEUTS SEG # BLD: 4.54 K/UL (ref 1.7–8.2)
NEUTS SEG NFR BLD: 63.8 % (ref 43–78)
NITRITE UR QL: NEGATIVE
NRBC # BLD: 0 K/UL (ref 0–0.2)
PH UR: 6.5 (ref 5–9)
PLATELET # BLD AUTO: 147 K/UL (ref 150–450)
PMV BLD AUTO: 10.7 FL (ref 9.4–12.3)
POTASSIUM SERPL-SCNC: 3.8 MMOL/L (ref 3.5–5.1)
PROT SERPL-MCNC: 7.6 G/DL (ref 6.3–8.2)
PROT UR QL: NEGATIVE MG/DL
RBC # BLD AUTO: 5.05 M/UL (ref 4.23–5.6)
RBC # UR STRIP: NEGATIVE
SERVICE CMNT-IMP: ABNORMAL
SODIUM SERPL-SCNC: 138 MMOL/L (ref 136–145)
SP GR UR: >1.03 (ref 1–1.02)
UROBILINOGEN UR QL: 0.2 EU/DL (ref 0.2–1)
WBC # BLD AUTO: 7.1 K/UL (ref 4.3–11.1)

## 2025-06-23 PROCEDURE — 85025 COMPLETE CBC W/AUTO DIFF WBC: CPT

## 2025-06-23 PROCEDURE — 76870 US EXAM SCROTUM: CPT

## 2025-06-23 PROCEDURE — 80053 COMPREHEN METABOLIC PANEL: CPT

## 2025-06-23 PROCEDURE — 99284 EMERGENCY DEPT VISIT MOD MDM: CPT

## 2025-06-23 PROCEDURE — 81003 URINALYSIS AUTO W/O SCOPE: CPT

## 2025-06-23 RX ORDER — NAPROXEN 375 MG/1
375 TABLET ORAL 2 TIMES DAILY WITH MEALS
Qty: 14 TABLET | Refills: 0 | Status: SHIPPED | OUTPATIENT
Start: 2025-06-23 | End: 2025-06-30

## 2025-06-23 ASSESSMENT — PAIN - FUNCTIONAL ASSESSMENT
PAIN_FUNCTIONAL_ASSESSMENT: NONE - DENIES PAIN
PAIN_FUNCTIONAL_ASSESSMENT: 0-10

## 2025-06-23 ASSESSMENT — PAIN SCALES - GENERAL: PAINLEVEL_OUTOF10: 2

## 2025-06-23 ASSESSMENT — PAIN DESCRIPTION - LOCATION: LOCATION: SCROTUM

## 2025-06-23 ASSESSMENT — PAIN DESCRIPTION - DESCRIPTORS: DESCRIPTORS: ACHING;DULL

## 2025-06-23 NOTE — ED PROVIDER NOTES
Absolute 0.55 0.10 - 1.30 K/UL    Eosinophils Absolute 0.17 0.00 - 0.80 K/UL    Basophils Absolute 0.04 0.00 - 0.20 K/UL    Immature Granulocytes Absolute 0.04 0.0 - 0.5 K/UL   CMP   Result Value Ref Range    Sodium 138 136 - 145 mmol/L    Potassium 3.8 3.5 - 5.1 mmol/L    Chloride 99 98 - 107 mmol/L    CO2 25 20 - 29 mmol/L    Anion Gap 14 7 - 16 mmol/L    Glucose 88 70 - 99 mg/dL    BUN 20 6 - 23 MG/DL    Creatinine 1.68 (H) 0.80 - 1.30 MG/DL    Est, Glom Filt Rate 51 (L) >60 ml/min/1.73m2    Calcium 10.0 8.8 - 10.2 MG/DL    Total Bilirubin 0.7 0.0 - 1.2 MG/DL    ALT 26 8 - 55 U/L    AST 42 (H) 15 - 37 U/L    Alk Phosphatase 73 40 - 129 U/L    Total Protein 7.6 6.3 - 8.2 g/dL    Albumin 4.3 3.5 - 5.0 g/dL    Globulin 3.3 2.3 - 3.5 g/dL    Albumin/Globulin Ratio 1.3 1.0 - 1.9     POCT Urinalysis no Micro   Result Value Ref Range    Specific Gravity, Urine, POC >1.030 (H) 1.001 - 1.023    pH, Urine, POC 6.5 5.0 - 9.0      Protein, Urine, POC Negative NEG mg/dL    Glucose, UA POC Negative NEG mg/dL    Ketones, Urine, POC Negative NEG mg/dL    Bilirubin, Urine, POC Negative NEG      Blood, UA POC Negative NEG      URINE UROBILINOGEN POC 0.2 0.2 - 1.0 EU/dL    Nitrite, Urine, POC Negative NEG      Leukocyte Est, UA POC Negative NEG      Performed by: Mary Shelley          US SCROTUM AND TESTICLES   Final Result      1. Unremarkable.      Electronically signed by ALEJANDRO ROSALES MD                   No results for input(s): \"COVID19\" in the last 72 hours.     Voice dictation software was used during the making of this note.  This software is not perfect and grammatical and other typographical errors may be present.  This note has not been completely proofread for errors.      Delphine Bose MD  06/23/25 [id number]

## 2025-06-23 NOTE — ED TRIAGE NOTES
Pt c/o of groin pain for about a week. Reports pain worsened acutely yesterday with tenderness to touch. Pt reports pain to testicles. Denies any new sexual partners. No penile discharge.

## 2025-07-09 DIAGNOSIS — G62.9 NEUROPATHY: ICD-10-CM

## 2025-07-09 DIAGNOSIS — R89.4 SEROLOGIC ABNORMALITY: ICD-10-CM

## 2025-07-09 DIAGNOSIS — Z79.52 LONG TERM (CURRENT) USE OF SYSTEMIC STEROIDS: ICD-10-CM

## 2025-07-09 DIAGNOSIS — M06.4 INFLAMMATORY POLYARTHRITIS (HCC): ICD-10-CM

## 2025-07-09 DIAGNOSIS — R53.83 OTHER FATIGUE: ICD-10-CM

## 2025-07-09 DIAGNOSIS — Z79.899 HIGH RISK MEDICATION USE: ICD-10-CM

## 2025-07-09 DIAGNOSIS — M05.9 SEROPOSITIVE RHEUMATOID ARTHRITIS (HCC): ICD-10-CM

## 2025-07-09 DIAGNOSIS — M54.50 LOW BACK PAIN, UNSPECIFIED BACK PAIN LATERALITY, UNSPECIFIED CHRONICITY, UNSPECIFIED WHETHER SCIATICA PRESENT: ICD-10-CM

## 2025-07-09 DIAGNOSIS — M15.9 GENERALIZED OSTEOARTHRITIS: ICD-10-CM

## 2025-07-09 DIAGNOSIS — E55.9 HYPOVITAMINOSIS D: ICD-10-CM

## 2025-07-09 LAB
ALBUMIN SERPL-MCNC: 3.9 G/DL (ref 3.5–5)
ALBUMIN/GLOB SERPL: 1.4 (ref 1–1.9)
ALP SERPL-CCNC: 62 U/L (ref 40–129)
ALT SERPL-CCNC: 26 U/L (ref 8–55)
ANION GAP SERPL CALC-SCNC: 12 MMOL/L (ref 7–16)
AST SERPL-CCNC: 26 U/L (ref 15–37)
BILIRUB SERPL-MCNC: 0.4 MG/DL (ref 0–1.2)
BUN SERPL-MCNC: 21 MG/DL (ref 6–23)
CALCIUM SERPL-MCNC: 9.6 MG/DL (ref 8.8–10.2)
CHLORIDE SERPL-SCNC: 102 MMOL/L (ref 98–107)
CO2 SERPL-SCNC: 26 MMOL/L (ref 20–29)
CREAT SERPL-MCNC: 1.63 MG/DL (ref 0.8–1.3)
ERYTHROCYTE [DISTWIDTH] IN BLOOD BY AUTOMATED COUNT: 14.9 % (ref 11.9–14.6)
ERYTHROCYTE [SEDIMENTATION RATE] IN BLOOD: 3 MM/HR (ref 0–20)
GLOBULIN SER CALC-MCNC: 2.7 G/DL (ref 2.3–3.5)
GLUCOSE SERPL-MCNC: 98 MG/DL (ref 70–99)
HCT VFR BLD AUTO: 44.2 % (ref 41.1–50.3)
HGB BLD-MCNC: 14.9 G/DL (ref 13.6–17.2)
MCH RBC QN AUTO: 31 PG (ref 26.1–32.9)
MCHC RBC AUTO-ENTMCNC: 33.7 G/DL (ref 31.4–35)
MCV RBC AUTO: 91.9 FL (ref 82–102)
NRBC # BLD: 0 K/UL (ref 0–0.2)
PLATELET # BLD AUTO: 122 K/UL (ref 150–450)
PMV BLD AUTO: 11.6 FL (ref 9.4–12.3)
POTASSIUM SERPL-SCNC: 4.1 MMOL/L (ref 3.5–5.1)
PROT SERPL-MCNC: 6.6 G/DL (ref 6.3–8.2)
RBC # BLD AUTO: 4.81 M/UL (ref 4.23–5.6)
SODIUM SERPL-SCNC: 140 MMOL/L (ref 136–145)
WBC # BLD AUTO: 5.8 K/UL (ref 4.3–11.1)

## 2025-07-10 ENCOUNTER — TELEMEDICINE (OUTPATIENT)
Dept: RHEUMATOLOGY | Age: 45
End: 2025-07-10
Payer: COMMERCIAL

## 2025-07-10 DIAGNOSIS — R53.83 OTHER FATIGUE: ICD-10-CM

## 2025-07-10 DIAGNOSIS — E55.9 HYPOVITAMINOSIS D: ICD-10-CM

## 2025-07-10 DIAGNOSIS — G62.9 NEUROPATHY: ICD-10-CM

## 2025-07-10 DIAGNOSIS — M54.50 LOW BACK PAIN, UNSPECIFIED BACK PAIN LATERALITY, UNSPECIFIED CHRONICITY, UNSPECIFIED WHETHER SCIATICA PRESENT: ICD-10-CM

## 2025-07-10 DIAGNOSIS — M54.16 RADICULOPATHY, LUMBAR REGION: ICD-10-CM

## 2025-07-10 DIAGNOSIS — M05.9 SEROPOSITIVE RHEUMATOID ARTHRITIS (HCC): Primary | ICD-10-CM

## 2025-07-10 DIAGNOSIS — Z79.899 HIGH RISK MEDICATION USE: ICD-10-CM

## 2025-07-10 DIAGNOSIS — Z91.199 COMPLIANCE POOR: ICD-10-CM

## 2025-07-10 DIAGNOSIS — E78.5 DYSLIPIDEMIA: ICD-10-CM

## 2025-07-10 DIAGNOSIS — Z79.52 LONG TERM (CURRENT) USE OF SYSTEMIC STEROIDS: ICD-10-CM

## 2025-07-10 DIAGNOSIS — N18.31 STAGE 3A CHRONIC KIDNEY DISEASE (HCC): ICD-10-CM

## 2025-07-10 DIAGNOSIS — Z79.60 LONG TERM CURRENT USE OF IMMUNOSUPPRESSIVE DRUG: ICD-10-CM

## 2025-07-10 DIAGNOSIS — R89.4 SEROLOGIC ABNORMALITY: ICD-10-CM

## 2025-07-10 DIAGNOSIS — M15.9 GENERALIZED OSTEOARTHRITIS: ICD-10-CM

## 2025-07-10 PROCEDURE — 99215 OFFICE O/P EST HI 40 MIN: CPT | Performed by: INTERNAL MEDICINE

## 2025-07-10 RX ORDER — GABAPENTIN 300 MG/1
CAPSULE ORAL
Qty: 90 CAPSULE | Refills: 0 | Status: SHIPPED | OUTPATIENT
Start: 2025-07-10 | End: 2026-07-08

## 2025-07-10 RX ORDER — FOLIC ACID 1 MG/1
1 TABLET ORAL DAILY
Qty: 90 TABLET | Refills: 0 | Status: SHIPPED | OUTPATIENT
Start: 2025-07-10

## 2025-07-10 RX ORDER — METHOTREXATE 25 MG/ML
INJECTION, SOLUTION INTRAMUSCULAR; INTRATHECAL; INTRAVENOUS; SUBCUTANEOUS
Qty: 12 ML | Refills: 0 | Status: SHIPPED | OUTPATIENT
Start: 2025-07-10

## 2025-07-10 RX ORDER — PREDNISONE 5 MG/1
TABLET ORAL
Qty: 90 TABLET | Refills: 0 | Status: SHIPPED | OUTPATIENT
Start: 2025-07-10

## 2025-07-10 RX ORDER — DULOXETIN HYDROCHLORIDE 60 MG/1
60 CAPSULE, DELAYED RELEASE ORAL DAILY
Qty: 90 CAPSULE | Refills: 0 | Status: SHIPPED | OUTPATIENT
Start: 2025-07-10

## 2025-07-10 NOTE — PATIENT INSTRUCTIONS
Labs -CBC, CMP, sed rate before next visit  Can take prednisone 5 g once daily as needed for flaring  Continue but increase methotrexate to 20 mg subcu once weekly daily folic acid 1 mg  Vitamin D 2000 units daily   Continue gabapentin 300 mg daily in the evening at 7 PM  Continue but increase Cymbalta 60 mg daily  Return to clinic in 5 to 6 weeks with labs- Call if any issues    6WK,Labs BS prior

## 2025-07-11 RX ORDER — ATORVASTATIN CALCIUM 80 MG/1
80 TABLET, FILM COATED ORAL NIGHTLY
Qty: 90 TABLET | Refills: 2 | Status: SHIPPED | OUTPATIENT
Start: 2025-07-11

## 2025-07-11 NOTE — TELEPHONE ENCOUNTER
Refill Request     Last Seen: 2/25/2025    Last Written: Ordered On: 02/25/202   Disp-90 tablet, R-2   Next Appointment:   No future appointments.          Requested Prescriptions     Pending Prescriptions Disp Refills    atorvastatin (LIPITOR) 80 MG tablet 90 tablet 2     Sig: Take 1 tablet by mouth at bedtime

## 2025-08-20 DIAGNOSIS — R89.4 SEROLOGIC ABNORMALITY: ICD-10-CM

## 2025-08-20 DIAGNOSIS — M06.4 INFLAMMATORY POLYARTHRITIS (HCC): ICD-10-CM

## 2025-08-20 DIAGNOSIS — E55.9 HYPOVITAMINOSIS D: ICD-10-CM

## 2025-08-20 DIAGNOSIS — Z79.899 HIGH RISK MEDICATION USE: ICD-10-CM

## 2025-08-20 DIAGNOSIS — M54.50 LOW BACK PAIN, UNSPECIFIED BACK PAIN LATERALITY, UNSPECIFIED CHRONICITY, UNSPECIFIED WHETHER SCIATICA PRESENT: ICD-10-CM

## 2025-08-20 DIAGNOSIS — G62.9 NEUROPATHY: ICD-10-CM

## 2025-08-20 DIAGNOSIS — R53.83 OTHER FATIGUE: ICD-10-CM

## 2025-08-20 DIAGNOSIS — M15.9 GENERALIZED OSTEOARTHRITIS: ICD-10-CM

## 2025-08-20 DIAGNOSIS — Z79.52 LONG TERM (CURRENT) USE OF SYSTEMIC STEROIDS: ICD-10-CM

## 2025-08-20 DIAGNOSIS — M05.9 SEROPOSITIVE RHEUMATOID ARTHRITIS (HCC): ICD-10-CM

## 2025-08-20 LAB
ALBUMIN SERPL-MCNC: 3.8 G/DL (ref 3.5–5)
ALBUMIN/GLOB SERPL: 1.4 (ref 1–1.9)
ALP SERPL-CCNC: 75 U/L (ref 40–129)
ALT SERPL-CCNC: 20 U/L (ref 8–55)
ANION GAP SERPL CALC-SCNC: 11 MMOL/L (ref 7–16)
AST SERPL-CCNC: 24 U/L (ref 15–37)
BILIRUB SERPL-MCNC: 0.5 MG/DL (ref 0–1.2)
BUN SERPL-MCNC: 18 MG/DL (ref 6–23)
CALCIUM SERPL-MCNC: 9.4 MG/DL (ref 8.8–10.2)
CHLORIDE SERPL-SCNC: 100 MMOL/L (ref 98–107)
CO2 SERPL-SCNC: 29 MMOL/L (ref 20–29)
CREAT SERPL-MCNC: 1.49 MG/DL (ref 0.8–1.3)
ERYTHROCYTE [DISTWIDTH] IN BLOOD BY AUTOMATED COUNT: 14.3 % (ref 11.9–14.6)
ERYTHROCYTE [SEDIMENTATION RATE] IN BLOOD: 1 MM/HR (ref 0–20)
GLOBULIN SER CALC-MCNC: 2.6 G/DL (ref 2.3–3.5)
GLUCOSE SERPL-MCNC: 99 MG/DL (ref 70–99)
HCT VFR BLD AUTO: 42.7 % (ref 41.1–50.3)
HGB BLD-MCNC: 15 G/DL (ref 13.6–17.2)
MCH RBC QN AUTO: 32.8 PG (ref 26.1–32.9)
MCHC RBC AUTO-ENTMCNC: 35.1 G/DL (ref 31.4–35)
MCV RBC AUTO: 93.2 FL (ref 82–102)
NRBC # BLD: 0 K/UL (ref 0–0.2)
PLATELET # BLD AUTO: 141 K/UL (ref 150–450)
PMV BLD AUTO: 11.3 FL (ref 9.4–12.3)
POTASSIUM SERPL-SCNC: 3.7 MMOL/L (ref 3.5–5.1)
PROT SERPL-MCNC: 6.4 G/DL (ref 6.3–8.2)
RBC # BLD AUTO: 4.58 M/UL (ref 4.23–5.6)
SODIUM SERPL-SCNC: 140 MMOL/L (ref 136–145)
WBC # BLD AUTO: 6.1 K/UL (ref 4.3–11.1)

## 2025-08-21 ENCOUNTER — TELEMEDICINE (OUTPATIENT)
Dept: RHEUMATOLOGY | Age: 45
End: 2025-08-21
Payer: COMMERCIAL

## 2025-08-21 DIAGNOSIS — E55.9 HYPOVITAMINOSIS D: ICD-10-CM

## 2025-08-21 DIAGNOSIS — R89.4 SEROLOGIC ABNORMALITY: ICD-10-CM

## 2025-08-21 DIAGNOSIS — M15.9 GENERALIZED OSTEOARTHRITIS: ICD-10-CM

## 2025-08-21 DIAGNOSIS — M05.9 SEROPOSITIVE RHEUMATOID ARTHRITIS (HCC): Primary | ICD-10-CM

## 2025-08-21 DIAGNOSIS — Z79.899 HIGH RISK MEDICATION USE: ICD-10-CM

## 2025-08-21 DIAGNOSIS — M54.16 RADICULOPATHY, LUMBAR REGION: ICD-10-CM

## 2025-08-21 DIAGNOSIS — R53.83 OTHER FATIGUE: ICD-10-CM

## 2025-08-21 DIAGNOSIS — Z79.60 LONG TERM CURRENT USE OF IMMUNOSUPPRESSIVE DRUG: ICD-10-CM

## 2025-08-21 DIAGNOSIS — Z23 NEED FOR INFLUENZA VACCINATION: ICD-10-CM

## 2025-08-21 DIAGNOSIS — Z91.199 COMPLIANCE POOR: ICD-10-CM

## 2025-08-21 DIAGNOSIS — N18.31 STAGE 3A CHRONIC KIDNEY DISEASE (HCC): ICD-10-CM

## 2025-08-21 DIAGNOSIS — M54.50 LOW BACK PAIN, UNSPECIFIED BACK PAIN LATERALITY, UNSPECIFIED CHRONICITY, UNSPECIFIED WHETHER SCIATICA PRESENT: ICD-10-CM

## 2025-08-21 DIAGNOSIS — G62.9 NEUROPATHY: ICD-10-CM

## 2025-08-21 DIAGNOSIS — Z79.52 LONG TERM (CURRENT) USE OF SYSTEMIC STEROIDS: ICD-10-CM

## 2025-08-21 PROCEDURE — 99215 OFFICE O/P EST HI 40 MIN: CPT | Performed by: INTERNAL MEDICINE

## 2025-08-21 RX ORDER — FOLIC ACID 1 MG/1
1 TABLET ORAL DAILY
Qty: 90 TABLET | Refills: 0 | Status: SHIPPED | OUTPATIENT
Start: 2025-08-21

## 2025-08-21 RX ORDER — MULTIVIT-MIN/IRON/FOLIC ACID/K 18-600-40
2000 CAPSULE ORAL DAILY
COMMUNITY

## 2025-08-21 RX ORDER — METHOTREXATE 25 MG/ML
INJECTION, SOLUTION INTRAMUSCULAR; INTRATHECAL; INTRAVENOUS; SUBCUTANEOUS
Qty: 12 ML | Refills: 0 | Status: SHIPPED | OUTPATIENT
Start: 2025-08-21

## 2025-08-21 RX ORDER — DULOXETIN HYDROCHLORIDE 60 MG/1
60 CAPSULE, DELAYED RELEASE ORAL DAILY
Qty: 90 CAPSULE | Refills: 0 | Status: SHIPPED | OUTPATIENT
Start: 2025-08-21

## 2025-08-21 RX ORDER — PREDNISONE 5 MG/1
TABLET ORAL
Qty: 90 TABLET | Refills: 0 | Status: SHIPPED | OUTPATIENT
Start: 2025-08-21

## 2025-08-21 RX ORDER — SYRINGE AND NEEDLE,INSULIN,1ML 28GX1/2"
SYRINGE, EMPTY DISPOSABLE MISCELLANEOUS
Qty: 100 EACH | Refills: 0 | Status: SHIPPED | OUTPATIENT
Start: 2025-08-21

## 2025-08-21 RX ORDER — GABAPENTIN 300 MG/1
CAPSULE ORAL
Qty: 180 CAPSULE | Refills: 0 | Status: SHIPPED | OUTPATIENT
Start: 2025-08-21 | End: 2026-08-19

## 2025-08-21 RX ORDER — INFLUENZA A VIRUS A/VICTORIA/4897/2022 IVR-238 (H1N1) ANTIGEN (FORMALDEHYDE INACTIVATED), INFLUENZA A VIRUS A/CALIFORNIA/122/2022 SAN-022 (H3N2) ANTIGEN (FORMALDEHYDE INACTIVATED), AND INFLUENZA B VIRUS B/MICHIGAN/01/2021 ANTIGEN (FORMALDEHYDE INACTIVATED) 60; 60; 60 UG/.5ML; UG/.5ML; UG/.5ML
0.5 INJECTION, SUSPENSION INTRAMUSCULAR ONCE
Qty: 0.5 ML | Refills: 0 | Status: SHIPPED | OUTPATIENT
Start: 2025-08-21 | End: 2025-08-21

## 2025-08-29 ENCOUNTER — TELEPHONE (OUTPATIENT)
Dept: RHEUMATOLOGY | Age: 45
End: 2025-08-29

## (undated) DEVICE — INTENDED FOR TISSUE SEPARATION, AND OTHER PROCEDURES THAT REQUIRE A SHARP SURGICAL BLADE TO PUNCTURE OR CUT.: Brand: BARD-PARKER ® STAINLESS STEEL BLADES

## (undated) DEVICE — OUTFLOW CASSETTE TUBING, DO NOT USE IF PACKAGE IS DAMAGED: Brand: CROSSFLOW

## (undated) DEVICE — (D)PREP SKN CHLRAPRP APPL 26ML -- CONVERT TO ITEM 371833

## (undated) DEVICE — TIP COVER ACCESSORY

## (undated) DEVICE — SUTURE SZ 0 27IN 5/8 CIR UR-6  TAPER PT VIOLET ABSRB VICRYL J603H

## (undated) DEVICE — NDL SPNE QNCKE 18GX3.5IN LF --

## (undated) DEVICE — 5.0MM PRECISION ROUND

## (undated) DEVICE — 1010 S-DRAPE TOWEL DRAPE 10/BX: Brand: STERI-DRAPE™

## (undated) DEVICE — MASTISOL ADHESIVE LIQ 2/3ML

## (undated) DEVICE — (D)STRIP SKN CLSR 0.5X4IN WHT --

## (undated) DEVICE — PACK,SHOULDER,DRAPE,POUCH: Brand: MEDLINE

## (undated) DEVICE — ULTRATAPE SUTURE COBRAID BLUE, 6                                    PER BOX: Brand: ULTRATAPE

## (undated) DEVICE — 4-PORT MANIFOLD: Brand: NEPTUNE 2

## (undated) DEVICE — AIRSEAL 8 MM CANNULA CAP AND OBTURATOR WITH BLADELESS OPTICAL TIP COMPATIBLE WITH INTUITIVE DA VINCI XI AND DA VINCI X 8 MM INSTRUMENT CANNULA, STANDARD LENGTH: Brand: AIRSEAL

## (undated) DEVICE — [AGGRESSIVE 6-FLUTE BARREL BUR, ARTHROSCOPIC SHAVER BLADE,  DO NOT RESTERILIZE,  DO NOT USE IF PACKAGE IS DAMAGED,  KEEP DRY,  KEEP AWAY FROM SUNLIGHT]: Brand: FORMULA

## (undated) DEVICE — BLADELESS OBTURATOR: Brand: WECK VISTA

## (undated) DEVICE — DRAPE XR C ARM 41X74IN LF --

## (undated) DEVICE — ARM DRAPE

## (undated) DEVICE — INFLOW CASSETTE TUBING, DO NOT USE IF PACKAGE IS DAMAGED: Brand: CROSSFLOW

## (undated) DEVICE — SUTURE STRATAFIX SZ 3-0 L14CM NONABSORBABLE UD L19MM FS-2 SXMP2B406

## (undated) DEVICE — REM POLYHESIVE ADULT PATIENT RETURN ELECTRODE: Brand: VALLEYLAB

## (undated) DEVICE — SUTURE VCRL + SZ 3-0 L18IN ABSRB UD PS-2 3/8 CIR REV CUT VCP497H

## (undated) DEVICE — DRSG GZ OIL EMUL CURAD 3X8 --

## (undated) DEVICE — KENDALL SCD EXPRESS SLEEVES, KNEE LENGTH, MEDIUM: Brand: KENDALL SCD

## (undated) DEVICE — ROBOTIC HYSTERECTOMY: Brand: MEDLINE INDUSTRIES, INC.

## (undated) DEVICE — BUTTON SWITCH PENCIL BLADE ELECTRODE, HOLSTER: Brand: EDGE

## (undated) DEVICE — PADDING CAST W4INXL4YD ST COT COHESIVE HND TEARABLE SPEC

## (undated) DEVICE — SUTURE MCRYL SZ 2-0 L27IN ABSRB UD CP-1 1 L36MM 1/2 CIR REV Y266H

## (undated) DEVICE — SURGICAL PROCEDURE PACK BASIC ST FRANCIS

## (undated) DEVICE — SOLUTION IV 1000ML 0.9% SOD CHL

## (undated) DEVICE — 3M™ STERI-STRIP™ REINFORCED ADHESIVE SKIN CLOSURES, R1548, 1 IN X 5 IN (25 MM X 125 MM), 4 STRIPS/ENVELOPE: Brand: 3M™ STERI-STRIP™

## (undated) DEVICE — SUTURE VCRL SZ 1 L27IN ABSRB UD L36MM CP-1 1/2 CIR REV CUT J268H

## (undated) DEVICE — GLOVE SURG SZ 7 L12IN FNGR THK79MIL GRN LTX FREE

## (undated) DEVICE — COLUMN DRAPE

## (undated) DEVICE — PACK PROCEDURE SURG POST LAMINECTOMY CDS

## (undated) DEVICE — SUTURE MCRYL SZ 4-0 L18IN ABSRB UD L19MM PS-2 3/8 CIR PRIM Y496G

## (undated) DEVICE — SUTURE MCRYL SZ 3-0 L27IN ABSRB UD L19MM PS-2 3/8 CIR PRIM Y427H

## (undated) DEVICE — PAD PT POS 36 IN SURGYPAD DISP

## (undated) DEVICE — DRAPE SHT 3 QTR PROXIMA 53X77 --

## (undated) DEVICE — GLOVE SURG SZ 65 THK91MIL LTX FREE SYN POLYISOPRENE

## (undated) DEVICE — Device

## (undated) DEVICE — SOLUTION IRRIG 3000ML 0.9% SOD CHL FLX CONT 0797208] ICU MEDICAL INC]

## (undated) DEVICE — SUTURE VCRL SZ 2-0 L27IN ABSRB UD L36MM CP-1 1/2 CIR REV J266H

## (undated) DEVICE — AIRSEAL BIFURCATED FILTERED TUBESET WITH ACTIVATED CHARCOAL FILTER: Brand: AIRSEAL

## (undated) DEVICE — KIT POS W/ FOAM ARM CRADL SHEARGUARD CHST PD CVR FOR SPNL

## (undated) DEVICE — STERILE HOOK LOCK LATEX FREE ELASTIC BANDAGE 4INX5YD: Brand: HOOK LOCK™

## (undated) DEVICE — DISPOSABLE TOURNIQUET CUFF SINGLE BLADDER, DUAL PORT AND QUICK CONNECT CONNECTOR: Brand: COLOR CUFF

## (undated) DEVICE — SYR 50ML LR LCK 1ML GRAD NSAF --

## (undated) DEVICE — TISSUE RETRIEVAL SYSTEM: Brand: INZII RETRIEVAL SYSTEM

## (undated) DEVICE — BLADE SHV CUT MENIS AGG + 4MM --

## (undated) DEVICE — FLOSEAL HEMOSTATIC MATRIX, 5 ML: Brand: FLOSEAL

## (undated) DEVICE — TRUEPASS DISPOSABLE NEEDLES 5 PER BOX: Brand: TRUEPASS

## (undated) DEVICE — INSUFFLATION NEEDLE TO ESTABLISH PNEUMOPERITONEUM.: Brand: INSUFFLATION NEEDLE

## (undated) DEVICE — APPLICATOR MEDICATED 26 CC SOLUTION HI LT ORNG CHLORAPREP

## (undated) DEVICE — CANNULA THREADED FLEX 6.5 X 72MM: Brand: CLEAR-TRAC

## (undated) DEVICE — ULTRATAPE 2MM BLUE 38 PKG OF 6

## (undated) DEVICE — CLEAR-TRAC 7.0 MM X 72 MM THREADED                                    CANNULA, WITH DISPOSABLE OBTURATOR,                                    GREY, STERILE: Brand: CLEAR-TRAC

## (undated) DEVICE — 3M™ IOBAN™ 2 ANTIMICROBIAL INCISE DRAPE 6650EZ: Brand: IOBAN™ 2

## (undated) DEVICE — CANNULA SEAL

## (undated) DEVICE — 90-S ACCELERATOR, SUCTION PROBE, NON-BENDABLE, MAX CUT LEVEL 11: Brand: SERFAS ENERGY

## (undated) DEVICE — STRIP,CLOSURE,WOUND,MEDI-STRIP,1/2X4: Brand: MEDLINE

## (undated) DEVICE — SOFT SILICONE HYDROCELLULAR FOAM DRESSING WITH LOCK AWAY LAYER: Brand: ALLEVYN LIFE XL 21X21 CTN10

## (undated) DEVICE — WATERPROOF, BACTERIA PROOF DRESSING WITH ABSORBENT SEE THROUGH PAD: Brand: OPSITE POST-OP VISIBLE 15X10CM CTN 20

## (undated) DEVICE — BLADE ASSEMB CLP HAIR FINE --

## (undated) DEVICE — 3M™ TEGADERM™ TRANSPARENT FILM DRESSING FRAME STYLE, 1626W, 4 IN X 4-3/4 IN (10 CM X 12 CM), 50/CT 4CT/CASE: Brand: 3M™ TEGADERM™